# Patient Record
Sex: MALE | Race: ASIAN | NOT HISPANIC OR LATINO
[De-identification: names, ages, dates, MRNs, and addresses within clinical notes are randomized per-mention and may not be internally consistent; named-entity substitution may affect disease eponyms.]

---

## 2019-01-01 ENCOUNTER — APPOINTMENT (OUTPATIENT)
Dept: INFUSION THERAPY | Facility: HOSPITAL | Age: 74
End: 2019-01-01

## 2019-01-01 ENCOUNTER — LABORATORY RESULT (OUTPATIENT)
Age: 74
End: 2019-01-01

## 2019-01-01 ENCOUNTER — OUTPATIENT (OUTPATIENT)
Dept: OUTPATIENT SERVICES | Facility: HOSPITAL | Age: 74
LOS: 1 days | Discharge: HOME | End: 2019-01-01

## 2019-01-01 ENCOUNTER — FORM ENCOUNTER (OUTPATIENT)
Age: 74
End: 2019-01-01

## 2019-01-01 ENCOUNTER — APPOINTMENT (OUTPATIENT)
Dept: HEMATOLOGY ONCOLOGY | Facility: CLINIC | Age: 74
End: 2019-01-01
Payer: MEDICARE

## 2019-01-01 ENCOUNTER — RESULT REVIEW (OUTPATIENT)
Age: 74
End: 2019-01-01

## 2019-01-01 ENCOUNTER — OUTPATIENT (OUTPATIENT)
Dept: OUTPATIENT SERVICES | Facility: HOSPITAL | Age: 74
LOS: 1 days | End: 2019-01-01
Payer: COMMERCIAL

## 2019-01-01 ENCOUNTER — APPOINTMENT (OUTPATIENT)
Dept: COLORECTAL SURGERY | Facility: CLINIC | Age: 74
End: 2019-01-01

## 2019-01-01 ENCOUNTER — MOBILE ON CALL (OUTPATIENT)
Age: 74
End: 2019-01-01

## 2019-01-01 ENCOUNTER — TRANSCRIPTION ENCOUNTER (OUTPATIENT)
Age: 74
End: 2019-01-01

## 2019-01-01 ENCOUNTER — APPOINTMENT (OUTPATIENT)
Dept: INFUSION THERAPY | Facility: HOSPITAL | Age: 74
End: 2019-01-01
Payer: MEDICARE

## 2019-01-01 ENCOUNTER — APPOINTMENT (OUTPATIENT)
Dept: COLORECTAL SURGERY | Facility: CLINIC | Age: 74
End: 2019-01-01
Payer: MEDICARE

## 2019-01-01 ENCOUNTER — RX RENEWAL (OUTPATIENT)
Age: 74
End: 2019-01-01

## 2019-01-01 ENCOUNTER — APPOINTMENT (OUTPATIENT)
Dept: OTOLARYNGOLOGY | Facility: CLINIC | Age: 74
End: 2019-01-01
Payer: MEDICARE

## 2019-01-01 ENCOUNTER — APPOINTMENT (OUTPATIENT)
Dept: HEMATOLOGY ONCOLOGY | Facility: CLINIC | Age: 74
End: 2019-01-01

## 2019-01-01 ENCOUNTER — APPOINTMENT (OUTPATIENT)
Dept: CT IMAGING | Facility: IMAGING CENTER | Age: 74
End: 2019-01-01
Payer: MEDICARE

## 2019-01-01 ENCOUNTER — APPOINTMENT (OUTPATIENT)
Dept: DERMATOLOGY | Facility: CLINIC | Age: 74
End: 2019-01-01

## 2019-01-01 VITALS
WEIGHT: 121.23 LBS | SYSTOLIC BLOOD PRESSURE: 163 MMHG | BODY MASS INDEX: 22.91 KG/M2 | OXYGEN SATURATION: 100 % | HEART RATE: 80 BPM | DIASTOLIC BLOOD PRESSURE: 71 MMHG | RESPIRATION RATE: 16 BRPM | TEMPERATURE: 98.4 F

## 2019-01-01 VITALS
TEMPERATURE: 98.2 F | OXYGEN SATURATION: 99 % | HEART RATE: 87 BPM | BODY MASS INDEX: 22.49 KG/M2 | WEIGHT: 119.05 LBS | SYSTOLIC BLOOD PRESSURE: 120 MMHG | RESPIRATION RATE: 16 BRPM | DIASTOLIC BLOOD PRESSURE: 70 MMHG

## 2019-01-01 VITALS
WEIGHT: 127.87 LBS | DIASTOLIC BLOOD PRESSURE: 70 MMHG | OXYGEN SATURATION: 99 % | HEART RATE: 83 BPM | SYSTOLIC BLOOD PRESSURE: 120 MMHG | RESPIRATION RATE: 14 BRPM | TEMPERATURE: 98.3 F | BODY MASS INDEX: 24.16 KG/M2

## 2019-01-01 VITALS
DIASTOLIC BLOOD PRESSURE: 70 MMHG | WEIGHT: 125.66 LBS | HEART RATE: 101 BPM | SYSTOLIC BLOOD PRESSURE: 130 MMHG | OXYGEN SATURATION: 99 % | BODY MASS INDEX: 23.74 KG/M2 | RESPIRATION RATE: 14 BRPM | TEMPERATURE: 98.2 F

## 2019-01-01 VITALS
DIASTOLIC BLOOD PRESSURE: 70 MMHG | SYSTOLIC BLOOD PRESSURE: 130 MMHG | RESPIRATION RATE: 16 BRPM | TEMPERATURE: 98 F | OXYGEN SATURATION: 99 % | WEIGHT: 119.05 LBS | HEART RATE: 82 BPM | BODY MASS INDEX: 22.49 KG/M2

## 2019-01-01 VITALS
TEMPERATURE: 98.2 F | HEART RATE: 88 BPM | OXYGEN SATURATION: 98 % | RESPIRATION RATE: 16 BRPM | BODY MASS INDEX: 21.87 KG/M2 | WEIGHT: 115.74 LBS | DIASTOLIC BLOOD PRESSURE: 70 MMHG | SYSTOLIC BLOOD PRESSURE: 124 MMHG

## 2019-01-01 VITALS
RESPIRATION RATE: 16 BRPM | WEIGHT: 118.61 LBS | DIASTOLIC BLOOD PRESSURE: 83 MMHG | BODY MASS INDEX: 22.41 KG/M2 | SYSTOLIC BLOOD PRESSURE: 138 MMHG | OXYGEN SATURATION: 98 % | HEART RATE: 83 BPM | TEMPERATURE: 97.6 F

## 2019-01-01 VITALS
SYSTOLIC BLOOD PRESSURE: 124 MMHG | OXYGEN SATURATION: 99 % | BODY MASS INDEX: 22.29 KG/M2 | HEART RATE: 82 BPM | WEIGHT: 117.95 LBS | DIASTOLIC BLOOD PRESSURE: 72 MMHG | TEMPERATURE: 98.2 F | RESPIRATION RATE: 14 BRPM

## 2019-01-01 DIAGNOSIS — Z90.5 ACQUIRED ABSENCE OF KIDNEY: Chronic | ICD-10-CM

## 2019-01-01 DIAGNOSIS — Z90.79 ACQUIRED ABSENCE OF OTHER GENITAL ORGAN(S): Chronic | ICD-10-CM

## 2019-01-01 DIAGNOSIS — Z51.11 ENCOUNTER FOR ANTINEOPLASTIC CHEMOTHERAPY: ICD-10-CM

## 2019-01-01 DIAGNOSIS — E86.0 DEHYDRATION: ICD-10-CM

## 2019-01-01 DIAGNOSIS — C18.9 MALIGNANT NEOPLASM OF COLON, UNSPECIFIED: ICD-10-CM

## 2019-01-01 DIAGNOSIS — K56.609 UNSPECIFIED INTESTINAL OBSTRUCTION, UNSPECIFIED AS TO PARTIAL VERSUS COMPLETE OBSTRUCTION: Chronic | ICD-10-CM

## 2019-01-01 DIAGNOSIS — R11.2 NAUSEA WITH VOMITING, UNSPECIFIED: ICD-10-CM

## 2019-01-01 DIAGNOSIS — J06.9 ACUTE UPPER RESPIRATORY INFECTION, UNSPECIFIED: ICD-10-CM

## 2019-01-01 DIAGNOSIS — H92.11 OTORRHEA, RIGHT EAR: ICD-10-CM

## 2019-01-01 DIAGNOSIS — Z45.2 ENCOUNTER FOR ADJUSTMENT AND MANAGEMENT OF VASCULAR ACCESS DEVICE: ICD-10-CM

## 2019-01-01 LAB
BASOPHILS # BLD AUTO: 0 K/UL — SIGNIFICANT CHANGE UP (ref 0–0.2)
BASOPHILS # BLD AUTO: 0.1 K/UL — SIGNIFICANT CHANGE UP (ref 0–0.2)
BASOPHILS NFR BLD AUTO: 0.3 % — SIGNIFICANT CHANGE UP (ref 0–2)
BASOPHILS NFR BLD AUTO: 0.3 % — SIGNIFICANT CHANGE UP (ref 0–2)
BASOPHILS NFR BLD AUTO: 0.5 % — SIGNIFICANT CHANGE UP (ref 0–2)
BASOPHILS NFR BLD AUTO: 0.6 % — SIGNIFICANT CHANGE UP (ref 0–2)
BASOPHILS NFR BLD AUTO: 0.7 % — SIGNIFICANT CHANGE UP (ref 0–2)
BASOPHILS NFR BLD AUTO: 0.8 % — SIGNIFICANT CHANGE UP (ref 0–2)
BASOPHILS NFR BLD AUTO: 0.8 % — SIGNIFICANT CHANGE UP (ref 0–2)
BASOPHILS NFR BLD AUTO: 0.9 % — SIGNIFICANT CHANGE UP (ref 0–2)
BASOPHILS NFR BLD AUTO: 0.9 % — SIGNIFICANT CHANGE UP (ref 0–2)
EOSINOPHIL # BLD AUTO: 0.2 K/UL — SIGNIFICANT CHANGE UP (ref 0–0.5)
EOSINOPHIL # BLD AUTO: 0.2 K/UL — SIGNIFICANT CHANGE UP (ref 0–0.5)
EOSINOPHIL # BLD AUTO: 0.3 K/UL — SIGNIFICANT CHANGE UP (ref 0–0.5)
EOSINOPHIL # BLD AUTO: 0.4 K/UL — SIGNIFICANT CHANGE UP (ref 0–0.5)
EOSINOPHIL # BLD AUTO: 0.5 K/UL — SIGNIFICANT CHANGE UP (ref 0–0.5)
EOSINOPHIL # BLD AUTO: 0.5 K/UL — SIGNIFICANT CHANGE UP (ref 0–0.5)
EOSINOPHIL # BLD AUTO: 0.6 K/UL — HIGH (ref 0–0.5)
EOSINOPHIL # BLD AUTO: 0.7 K/UL — HIGH (ref 0–0.5)
EOSINOPHIL # BLD AUTO: 0.8 K/UL — HIGH (ref 0–0.5)
EOSINOPHIL NFR BLD AUTO: 2.9 % — SIGNIFICANT CHANGE UP (ref 0–6)
EOSINOPHIL NFR BLD AUTO: 3.2 % — SIGNIFICANT CHANGE UP (ref 0–6)
EOSINOPHIL NFR BLD AUTO: 3.4 % — SIGNIFICANT CHANGE UP (ref 0–6)
EOSINOPHIL NFR BLD AUTO: 4 % — SIGNIFICANT CHANGE UP (ref 0–6)
EOSINOPHIL NFR BLD AUTO: 4.8 % — SIGNIFICANT CHANGE UP (ref 0–6)
EOSINOPHIL NFR BLD AUTO: 5 % — SIGNIFICANT CHANGE UP (ref 0–6)
EOSINOPHIL NFR BLD AUTO: 5.1 % — SIGNIFICANT CHANGE UP (ref 0–6)
EOSINOPHIL NFR BLD AUTO: 6 % — SIGNIFICANT CHANGE UP (ref 0–6)
EOSINOPHIL NFR BLD AUTO: 6.3 % — HIGH (ref 0–6)
EOSINOPHIL NFR BLD AUTO: 6.8 % — HIGH (ref 0–6)
EOSINOPHIL NFR BLD AUTO: 6.8 % — HIGH (ref 0–6)
EOSINOPHIL NFR BLD AUTO: 7.1 % — HIGH (ref 0–6)
EOSINOPHIL NFR BLD AUTO: 7.6 % — HIGH (ref 0–6)
EOSINOPHIL NFR BLD AUTO: 7.7 % — HIGH (ref 0–6)
EOSINOPHIL NFR BLD AUTO: 7.7 % — HIGH (ref 0–6)
GLUCOSE BLDC GLUCOMTR-MCNC: 97 MG/DL — SIGNIFICANT CHANGE UP (ref 70–99)
HCT VFR BLD CALC: 30.5 % — LOW (ref 39–50)
HCT VFR BLD CALC: 30.7 % — LOW (ref 39–50)
HCT VFR BLD CALC: 32.9 % — LOW (ref 39–50)
HCT VFR BLD CALC: 36.2 % — LOW (ref 39–50)
HCT VFR BLD CALC: 36.4 % — LOW (ref 39–50)
HCT VFR BLD CALC: 37.9 % — LOW (ref 39–50)
HCT VFR BLD CALC: 38.4 % — LOW (ref 39–50)
HCT VFR BLD CALC: 38.7 % — LOW (ref 39–50)
HCT VFR BLD CALC: 39.4 % — SIGNIFICANT CHANGE UP (ref 39–50)
HCT VFR BLD CALC: 39.7 % — SIGNIFICANT CHANGE UP (ref 39–50)
HCT VFR BLD CALC: 41.6 % — SIGNIFICANT CHANGE UP (ref 39–50)
HCT VFR BLD CALC: 41.9 % — SIGNIFICANT CHANGE UP (ref 39–50)
HCT VFR BLD CALC: 42.7 % — SIGNIFICANT CHANGE UP (ref 39–50)
HCT VFR BLD CALC: 43.1 % — SIGNIFICANT CHANGE UP (ref 39–50)
HCT VFR BLD CALC: 43.4 % — SIGNIFICANT CHANGE UP (ref 39–50)
HGB BLD-MCNC: 10.3 G/DL — LOW (ref 13–17)
HGB BLD-MCNC: 10.6 G/DL — LOW (ref 13–17)
HGB BLD-MCNC: 11 G/DL — LOW (ref 13–17)
HGB BLD-MCNC: 12.1 G/DL — LOW (ref 13–17)
HGB BLD-MCNC: 12.1 G/DL — LOW (ref 13–17)
HGB BLD-MCNC: 12.2 G/DL — LOW (ref 13–17)
HGB BLD-MCNC: 12.9 G/DL — LOW (ref 13–17)
HGB BLD-MCNC: 13 G/DL — SIGNIFICANT CHANGE UP (ref 13–17)
HGB BLD-MCNC: 13.4 G/DL — SIGNIFICANT CHANGE UP (ref 13–17)
HGB BLD-MCNC: 13.5 G/DL — SIGNIFICANT CHANGE UP (ref 13–17)
HGB BLD-MCNC: 14 G/DL — SIGNIFICANT CHANGE UP (ref 13–17)
HGB BLD-MCNC: 14 G/DL — SIGNIFICANT CHANGE UP (ref 13–17)
HGB BLD-MCNC: 14.2 G/DL — SIGNIFICANT CHANGE UP (ref 13–17)
HGB BLD-MCNC: 14.2 G/DL — SIGNIFICANT CHANGE UP (ref 13–17)
HGB BLD-MCNC: 14.4 G/DL — SIGNIFICANT CHANGE UP (ref 13–17)
LYMPHOCYTES # BLD AUTO: 1.4 K/UL — SIGNIFICANT CHANGE UP (ref 1–3.3)
LYMPHOCYTES # BLD AUTO: 1.5 K/UL — SIGNIFICANT CHANGE UP (ref 1–3.3)
LYMPHOCYTES # BLD AUTO: 1.5 K/UL — SIGNIFICANT CHANGE UP (ref 1–3.3)
LYMPHOCYTES # BLD AUTO: 1.6 K/UL — SIGNIFICANT CHANGE UP (ref 1–3.3)
LYMPHOCYTES # BLD AUTO: 1.6 K/UL — SIGNIFICANT CHANGE UP (ref 1–3.3)
LYMPHOCYTES # BLD AUTO: 1.7 K/UL — SIGNIFICANT CHANGE UP (ref 1–3.3)
LYMPHOCYTES # BLD AUTO: 1.8 K/UL — SIGNIFICANT CHANGE UP (ref 1–3.3)
LYMPHOCYTES # BLD AUTO: 1.8 K/UL — SIGNIFICANT CHANGE UP (ref 1–3.3)
LYMPHOCYTES # BLD AUTO: 1.9 K/UL — SIGNIFICANT CHANGE UP (ref 1–3.3)
LYMPHOCYTES # BLD AUTO: 17.9 % — SIGNIFICANT CHANGE UP (ref 13–44)
LYMPHOCYTES # BLD AUTO: 2 K/UL — SIGNIFICANT CHANGE UP (ref 1–3.3)
LYMPHOCYTES # BLD AUTO: 2.1 K/UL — SIGNIFICANT CHANGE UP (ref 1–3.3)
LYMPHOCYTES # BLD AUTO: 2.2 K/UL — SIGNIFICANT CHANGE UP (ref 1–3.3)
LYMPHOCYTES # BLD AUTO: 20.9 % — SIGNIFICANT CHANGE UP (ref 13–44)
LYMPHOCYTES # BLD AUTO: 22.7 % — SIGNIFICANT CHANGE UP (ref 13–44)
LYMPHOCYTES # BLD AUTO: 23.6 % — SIGNIFICANT CHANGE UP (ref 13–44)
LYMPHOCYTES # BLD AUTO: 23.9 % — SIGNIFICANT CHANGE UP (ref 13–44)
LYMPHOCYTES # BLD AUTO: 24 % — SIGNIFICANT CHANGE UP (ref 13–44)
LYMPHOCYTES # BLD AUTO: 24 % — SIGNIFICANT CHANGE UP (ref 13–44)
LYMPHOCYTES # BLD AUTO: 25.1 % — SIGNIFICANT CHANGE UP (ref 13–44)
LYMPHOCYTES # BLD AUTO: 25.9 % — SIGNIFICANT CHANGE UP (ref 13–44)
LYMPHOCYTES # BLD AUTO: 26.4 % — SIGNIFICANT CHANGE UP (ref 13–44)
LYMPHOCYTES # BLD AUTO: 27 % — SIGNIFICANT CHANGE UP (ref 13–44)
LYMPHOCYTES # BLD AUTO: 28.9 % — SIGNIFICANT CHANGE UP (ref 13–44)
LYMPHOCYTES # BLD AUTO: 29.5 % — SIGNIFICANT CHANGE UP (ref 13–44)
LYMPHOCYTES # BLD AUTO: 31.4 % — SIGNIFICANT CHANGE UP (ref 13–44)
LYMPHOCYTES # BLD AUTO: 46 % — HIGH (ref 13–44)
MCHC RBC-ENTMCNC: 29.6 PG — SIGNIFICANT CHANGE UP (ref 27–34)
MCHC RBC-ENTMCNC: 30.4 PG — SIGNIFICANT CHANGE UP (ref 27–34)
MCHC RBC-ENTMCNC: 30.6 PG — SIGNIFICANT CHANGE UP (ref 27–34)
MCHC RBC-ENTMCNC: 31 PG — SIGNIFICANT CHANGE UP (ref 27–34)
MCHC RBC-ENTMCNC: 31 PG — SIGNIFICANT CHANGE UP (ref 27–34)
MCHC RBC-ENTMCNC: 31.5 PG — SIGNIFICANT CHANGE UP (ref 27–34)
MCHC RBC-ENTMCNC: 31.7 PG — SIGNIFICANT CHANGE UP (ref 27–34)
MCHC RBC-ENTMCNC: 31.8 PG — SIGNIFICANT CHANGE UP (ref 27–34)
MCHC RBC-ENTMCNC: 32 G/DL — SIGNIFICANT CHANGE UP (ref 32–36)
MCHC RBC-ENTMCNC: 32.1 PG — SIGNIFICANT CHANGE UP (ref 27–34)
MCHC RBC-ENTMCNC: 32.7 G/DL — SIGNIFICANT CHANGE UP (ref 32–36)
MCHC RBC-ENTMCNC: 32.8 G/DL — SIGNIFICANT CHANGE UP (ref 32–36)
MCHC RBC-ENTMCNC: 33.1 G/DL — SIGNIFICANT CHANGE UP (ref 32–36)
MCHC RBC-ENTMCNC: 33.1 PG — SIGNIFICANT CHANGE UP (ref 27–34)
MCHC RBC-ENTMCNC: 33.3 G/DL — SIGNIFICANT CHANGE UP (ref 32–36)
MCHC RBC-ENTMCNC: 33.4 G/DL — SIGNIFICANT CHANGE UP (ref 32–36)
MCHC RBC-ENTMCNC: 33.5 G/DL — SIGNIFICANT CHANGE UP (ref 32–36)
MCHC RBC-ENTMCNC: 33.6 G/DL — SIGNIFICANT CHANGE UP (ref 32–36)
MCHC RBC-ENTMCNC: 33.6 G/DL — SIGNIFICANT CHANGE UP (ref 32–36)
MCHC RBC-ENTMCNC: 33.7 G/DL — SIGNIFICANT CHANGE UP (ref 32–36)
MCHC RBC-ENTMCNC: 33.8 G/DL — SIGNIFICANT CHANGE UP (ref 32–36)
MCHC RBC-ENTMCNC: 33.9 G/DL — SIGNIFICANT CHANGE UP (ref 32–36)
MCHC RBC-ENTMCNC: 33.9 PG — SIGNIFICANT CHANGE UP (ref 27–34)
MCHC RBC-ENTMCNC: 34 PG — SIGNIFICANT CHANGE UP (ref 27–34)
MCHC RBC-ENTMCNC: 34.1 PG — HIGH (ref 27–34)
MCHC RBC-ENTMCNC: 34.2 G/DL — SIGNIFICANT CHANGE UP (ref 32–36)
MCHC RBC-ENTMCNC: 34.3 G/DL — SIGNIFICANT CHANGE UP (ref 32–36)
MCHC RBC-ENTMCNC: 34.4 PG — HIGH (ref 27–34)
MCHC RBC-ENTMCNC: 34.4 PG — HIGH (ref 27–34)
MCHC RBC-ENTMCNC: 34.5 G/DL — SIGNIFICANT CHANGE UP (ref 32–36)
MCV RBC AUTO: 100 FL — SIGNIFICANT CHANGE UP (ref 80–100)
MCV RBC AUTO: 100 FL — SIGNIFICANT CHANGE UP (ref 80–100)
MCV RBC AUTO: 101 FL — HIGH (ref 80–100)
MCV RBC AUTO: 90 FL — SIGNIFICANT CHANGE UP (ref 80–100)
MCV RBC AUTO: 90.4 FL — SIGNIFICANT CHANGE UP (ref 80–100)
MCV RBC AUTO: 91.2 FL — SIGNIFICANT CHANGE UP (ref 80–100)
MCV RBC AUTO: 92.3 FL — SIGNIFICANT CHANGE UP (ref 80–100)
MCV RBC AUTO: 92.7 FL — SIGNIFICANT CHANGE UP (ref 80–100)
MCV RBC AUTO: 94.3 FL — SIGNIFICANT CHANGE UP (ref 80–100)
MCV RBC AUTO: 95.2 FL — SIGNIFICANT CHANGE UP (ref 80–100)
MCV RBC AUTO: 95.8 FL — SIGNIFICANT CHANGE UP (ref 80–100)
MCV RBC AUTO: 97.1 FL — SIGNIFICANT CHANGE UP (ref 80–100)
MONOCYTES # BLD AUTO: 0.5 K/UL — SIGNIFICANT CHANGE UP (ref 0–0.9)
MONOCYTES # BLD AUTO: 0.6 K/UL — SIGNIFICANT CHANGE UP (ref 0–0.9)
MONOCYTES # BLD AUTO: 0.6 K/UL — SIGNIFICANT CHANGE UP (ref 0–0.9)
MONOCYTES # BLD AUTO: 0.7 K/UL — SIGNIFICANT CHANGE UP (ref 0–0.9)
MONOCYTES # BLD AUTO: 0.7 K/UL — SIGNIFICANT CHANGE UP (ref 0–0.9)
MONOCYTES # BLD AUTO: 0.8 K/UL — SIGNIFICANT CHANGE UP (ref 0–0.9)
MONOCYTES # BLD AUTO: 0.9 K/UL — SIGNIFICANT CHANGE UP (ref 0–0.9)
MONOCYTES # BLD AUTO: 0.9 K/UL — SIGNIFICANT CHANGE UP (ref 0–0.9)
MONOCYTES NFR BLD AUTO: 10.6 % — SIGNIFICANT CHANGE UP (ref 2–14)
MONOCYTES NFR BLD AUTO: 12.4 % — SIGNIFICANT CHANGE UP (ref 2–14)
MONOCYTES NFR BLD AUTO: 12.4 % — SIGNIFICANT CHANGE UP (ref 2–14)
MONOCYTES NFR BLD AUTO: 12.6 % — SIGNIFICANT CHANGE UP (ref 2–14)
MONOCYTES NFR BLD AUTO: 13 % — SIGNIFICANT CHANGE UP (ref 2–14)
MONOCYTES NFR BLD AUTO: 15 % — HIGH (ref 2–14)
MONOCYTES NFR BLD AUTO: 6.2 % — SIGNIFICANT CHANGE UP (ref 2–14)
MONOCYTES NFR BLD AUTO: 6.6 % — SIGNIFICANT CHANGE UP (ref 2–14)
MONOCYTES NFR BLD AUTO: 6.9 % — SIGNIFICANT CHANGE UP (ref 2–14)
MONOCYTES NFR BLD AUTO: 7.1 % — SIGNIFICANT CHANGE UP (ref 2–14)
MONOCYTES NFR BLD AUTO: 8 % — SIGNIFICANT CHANGE UP (ref 2–14)
MONOCYTES NFR BLD AUTO: 8.2 % — SIGNIFICANT CHANGE UP (ref 2–14)
MONOCYTES NFR BLD AUTO: 8.6 % — SIGNIFICANT CHANGE UP (ref 2–14)
MONOCYTES NFR BLD AUTO: 8.9 % — SIGNIFICANT CHANGE UP (ref 2–14)
MONOCYTES NFR BLD AUTO: 9.2 % — SIGNIFICANT CHANGE UP (ref 2–14)
NEUTROPHILS # BLD AUTO: 1.6 K/UL — LOW (ref 1.8–7.4)
NEUTROPHILS # BLD AUTO: 2.8 K/UL — SIGNIFICANT CHANGE UP (ref 1.8–7.4)
NEUTROPHILS # BLD AUTO: 3 K/UL — SIGNIFICANT CHANGE UP (ref 1.8–7.4)
NEUTROPHILS # BLD AUTO: 3.1 K/UL — SIGNIFICANT CHANGE UP (ref 1.8–7.4)
NEUTROPHILS # BLD AUTO: 3.2 K/UL — SIGNIFICANT CHANGE UP (ref 1.8–7.4)
NEUTROPHILS # BLD AUTO: 3.5 K/UL — SIGNIFICANT CHANGE UP (ref 1.8–7.4)
NEUTROPHILS # BLD AUTO: 3.6 K/UL — SIGNIFICANT CHANGE UP (ref 1.8–7.4)
NEUTROPHILS # BLD AUTO: 3.8 K/UL — SIGNIFICANT CHANGE UP (ref 1.8–7.4)
NEUTROPHILS # BLD AUTO: 4.3 K/UL — SIGNIFICANT CHANGE UP (ref 1.8–7.4)
NEUTROPHILS # BLD AUTO: 4.7 K/UL — SIGNIFICANT CHANGE UP (ref 1.8–7.4)
NEUTROPHILS # BLD AUTO: 4.9 K/UL — SIGNIFICANT CHANGE UP (ref 1.8–7.4)
NEUTROPHILS # BLD AUTO: 5 K/UL — SIGNIFICANT CHANGE UP (ref 1.8–7.4)
NEUTROPHILS # BLD AUTO: 5.9 K/UL — SIGNIFICANT CHANGE UP (ref 1.8–7.4)
NEUTROPHILS # BLD AUTO: 6.6 K/UL — SIGNIFICANT CHANGE UP (ref 1.8–7.4)
NEUTROPHILS # BLD AUTO: 6.7 K/UL — SIGNIFICANT CHANGE UP (ref 1.8–7.4)
NEUTROPHILS NFR BLD AUTO: 35 % — LOW (ref 43–77)
NEUTROPHILS NFR BLD AUTO: 46.3 % — SIGNIFICANT CHANGE UP (ref 43–77)
NEUTROPHILS NFR BLD AUTO: 54 % — SIGNIFICANT CHANGE UP (ref 43–77)
NEUTROPHILS NFR BLD AUTO: 54.6 % — SIGNIFICANT CHANGE UP (ref 43–77)
NEUTROPHILS NFR BLD AUTO: 56 % — SIGNIFICANT CHANGE UP (ref 43–77)
NEUTROPHILS NFR BLD AUTO: 56 % — SIGNIFICANT CHANGE UP (ref 43–77)
NEUTROPHILS NFR BLD AUTO: 56.6 % — SIGNIFICANT CHANGE UP (ref 43–77)
NEUTROPHILS NFR BLD AUTO: 58.5 % — SIGNIFICANT CHANGE UP (ref 43–77)
NEUTROPHILS NFR BLD AUTO: 60.3 % — SIGNIFICANT CHANGE UP (ref 43–77)
NEUTROPHILS NFR BLD AUTO: 61.8 % — SIGNIFICANT CHANGE UP (ref 43–77)
NEUTROPHILS NFR BLD AUTO: 62.3 % — SIGNIFICANT CHANGE UP (ref 43–77)
NEUTROPHILS NFR BLD AUTO: 63.1 % — SIGNIFICANT CHANGE UP (ref 43–77)
NEUTROPHILS NFR BLD AUTO: 65.5 % — SIGNIFICANT CHANGE UP (ref 43–77)
NEUTROPHILS NFR BLD AUTO: 65.6 % — SIGNIFICANT CHANGE UP (ref 43–77)
NEUTROPHILS NFR BLD AUTO: 68.5 % — SIGNIFICANT CHANGE UP (ref 43–77)
PLAT MORPH BLD: NORMAL — SIGNIFICANT CHANGE UP
PLATELET # BLD AUTO: 164 K/UL — SIGNIFICANT CHANGE UP (ref 150–400)
PLATELET # BLD AUTO: 201 K/UL — SIGNIFICANT CHANGE UP (ref 150–400)
PLATELET # BLD AUTO: 206 K/UL — SIGNIFICANT CHANGE UP (ref 150–400)
PLATELET # BLD AUTO: 221 K/UL — SIGNIFICANT CHANGE UP (ref 150–400)
PLATELET # BLD AUTO: 224 K/UL — SIGNIFICANT CHANGE UP (ref 150–400)
PLATELET # BLD AUTO: 231 K/UL — SIGNIFICANT CHANGE UP (ref 150–400)
PLATELET # BLD AUTO: 252 K/UL — SIGNIFICANT CHANGE UP (ref 150–400)
PLATELET # BLD AUTO: 256 K/UL — SIGNIFICANT CHANGE UP (ref 150–400)
PLATELET # BLD AUTO: 260 K/UL — SIGNIFICANT CHANGE UP (ref 150–400)
PLATELET # BLD AUTO: 307 K/UL — SIGNIFICANT CHANGE UP (ref 150–400)
PLATELET # BLD AUTO: 313 K/UL — SIGNIFICANT CHANGE UP (ref 150–400)
PLATELET # BLD AUTO: 329 K/UL — SIGNIFICANT CHANGE UP (ref 150–400)
PLATELET # BLD AUTO: 385 K/UL — SIGNIFICANT CHANGE UP (ref 150–400)
PLATELET # BLD AUTO: 436 K/UL — HIGH (ref 150–400)
PLATELET # BLD AUTO: 479 K/UL — HIGH (ref 150–400)
RBC # BLD: 3.38 M/UL — LOW (ref 4.2–5.8)
RBC # BLD: 3.41 M/UL — LOW (ref 4.2–5.8)
RBC # BLD: 3.61 M/UL — LOW (ref 4.2–5.8)
RBC # BLD: 3.83 M/UL — LOW (ref 4.2–5.8)
RBC # BLD: 3.86 M/UL — LOW (ref 4.2–5.8)
RBC # BLD: 3.92 M/UL — LOW (ref 4.2–5.8)
RBC # BLD: 4.07 M/UL — LOW (ref 4.2–5.8)
RBC # BLD: 4.09 M/UL — LOW (ref 4.2–5.8)
RBC # BLD: 4.13 M/UL — LOW (ref 4.2–5.8)
RBC # BLD: 4.18 M/UL — LOW (ref 4.2–5.8)
RBC # BLD: 4.29 M/UL — SIGNIFICANT CHANGE UP (ref 4.2–5.8)
RBC # BLD: 4.44 M/UL — SIGNIFICANT CHANGE UP (ref 4.2–5.8)
RBC # BLD: 4.45 M/UL — SIGNIFICANT CHANGE UP (ref 4.2–5.8)
RBC # FLD: 12.8 % — SIGNIFICANT CHANGE UP (ref 10.3–14.5)
RBC # FLD: 12.9 % — SIGNIFICANT CHANGE UP (ref 10.3–14.5)
RBC # FLD: 13.1 % — SIGNIFICANT CHANGE UP (ref 10.3–14.5)
RBC # FLD: 13.4 % — SIGNIFICANT CHANGE UP (ref 10.3–14.5)
RBC # FLD: 13.5 % — SIGNIFICANT CHANGE UP (ref 10.3–14.5)
RBC # FLD: 13.5 % — SIGNIFICANT CHANGE UP (ref 10.3–14.5)
RBC # FLD: 13.8 % — SIGNIFICANT CHANGE UP (ref 10.3–14.5)
RBC # FLD: 14.2 % — SIGNIFICANT CHANGE UP (ref 10.3–14.5)
RBC # FLD: 14.5 % — SIGNIFICANT CHANGE UP (ref 10.3–14.5)
RBC # FLD: 15.3 % — HIGH (ref 10.3–14.5)
RBC # FLD: 15.6 % — HIGH (ref 10.3–14.5)
RBC # FLD: 16.1 % — HIGH (ref 10.3–14.5)
RBC # FLD: 16.1 % — HIGH (ref 10.3–14.5)
RBC # FLD: 16.2 % — HIGH (ref 10.3–14.5)
RBC # FLD: 16.4 % — HIGH (ref 10.3–14.5)
RBC BLD AUTO: SIGNIFICANT CHANGE UP
WBC # BLD: 10.3 K/UL — SIGNIFICANT CHANGE UP (ref 3.8–10.5)
WBC # BLD: 4.8 K/UL — SIGNIFICANT CHANGE UP (ref 3.8–10.5)
WBC # BLD: 5.5 K/UL — SIGNIFICANT CHANGE UP (ref 3.8–10.5)
WBC # BLD: 5.5 K/UL — SIGNIFICANT CHANGE UP (ref 3.8–10.5)
WBC # BLD: 5.7 K/UL — SIGNIFICANT CHANGE UP (ref 3.8–10.5)
WBC # BLD: 6 K/UL — SIGNIFICANT CHANGE UP (ref 3.8–10.5)
WBC # BLD: 6 K/UL — SIGNIFICANT CHANGE UP (ref 3.8–10.5)
WBC # BLD: 6.6 K/UL — SIGNIFICANT CHANGE UP (ref 3.8–10.5)
WBC # BLD: 6.8 K/UL — SIGNIFICANT CHANGE UP (ref 3.8–10.5)
WBC # BLD: 6.8 K/UL — SIGNIFICANT CHANGE UP (ref 3.8–10.5)
WBC # BLD: 7.2 K/UL — SIGNIFICANT CHANGE UP (ref 3.8–10.5)
WBC # BLD: 7.9 K/UL — SIGNIFICANT CHANGE UP (ref 3.8–10.5)
WBC # BLD: 8 K/UL — SIGNIFICANT CHANGE UP (ref 3.8–10.5)
WBC # BLD: 9.7 K/UL — SIGNIFICANT CHANGE UP (ref 3.8–10.5)
WBC # BLD: 9.8 K/UL — SIGNIFICANT CHANGE UP (ref 3.8–10.5)
WBC # FLD AUTO: 10.3 K/UL — SIGNIFICANT CHANGE UP (ref 3.8–10.5)
WBC # FLD AUTO: 4.8 K/UL — SIGNIFICANT CHANGE UP (ref 3.8–10.5)
WBC # FLD AUTO: 5.5 K/UL — SIGNIFICANT CHANGE UP (ref 3.8–10.5)
WBC # FLD AUTO: 5.5 K/UL — SIGNIFICANT CHANGE UP (ref 3.8–10.5)
WBC # FLD AUTO: 5.7 K/UL — SIGNIFICANT CHANGE UP (ref 3.8–10.5)
WBC # FLD AUTO: 6 K/UL — SIGNIFICANT CHANGE UP (ref 3.8–10.5)
WBC # FLD AUTO: 6 K/UL — SIGNIFICANT CHANGE UP (ref 3.8–10.5)
WBC # FLD AUTO: 6.6 K/UL — SIGNIFICANT CHANGE UP (ref 3.8–10.5)
WBC # FLD AUTO: 6.8 K/UL — SIGNIFICANT CHANGE UP (ref 3.8–10.5)
WBC # FLD AUTO: 6.8 K/UL — SIGNIFICANT CHANGE UP (ref 3.8–10.5)
WBC # FLD AUTO: 7.2 K/UL — SIGNIFICANT CHANGE UP (ref 3.8–10.5)
WBC # FLD AUTO: 7.9 K/UL — SIGNIFICANT CHANGE UP (ref 3.8–10.5)
WBC # FLD AUTO: 8 K/UL — SIGNIFICANT CHANGE UP (ref 3.8–10.5)
WBC # FLD AUTO: 9.7 K/UL — SIGNIFICANT CHANGE UP (ref 3.8–10.5)
WBC # FLD AUTO: 9.8 K/UL — SIGNIFICANT CHANGE UP (ref 3.8–10.5)

## 2019-01-01 PROCEDURE — 74177 CT ABD & PELVIS W/CONTRAST: CPT | Mod: 26

## 2019-01-01 PROCEDURE — 99213 OFFICE O/P EST LOW 20 MIN: CPT

## 2019-01-01 PROCEDURE — 99024 POSTOP FOLLOW-UP VISIT: CPT

## 2019-01-01 PROCEDURE — 76937 US GUIDE VASCULAR ACCESS: CPT | Mod: 26

## 2019-01-01 PROCEDURE — 71260 CT THORAX DX C+: CPT | Mod: 26

## 2019-01-01 PROCEDURE — C1894: CPT

## 2019-01-01 PROCEDURE — 71260 CT THORAX DX C+: CPT

## 2019-01-01 PROCEDURE — 99214 OFFICE O/P EST MOD 30 MIN: CPT

## 2019-01-01 PROCEDURE — 74177 CT ABD & PELVIS W/CONTRAST: CPT

## 2019-01-01 PROCEDURE — 77001 FLUOROGUIDE FOR VEIN DEVICE: CPT

## 2019-01-01 PROCEDURE — 99203 OFFICE O/P NEW LOW 30 MIN: CPT

## 2019-01-01 PROCEDURE — C1769: CPT

## 2019-01-01 PROCEDURE — 99215 OFFICE O/P EST HI 40 MIN: CPT

## 2019-01-01 PROCEDURE — 76937 US GUIDE VASCULAR ACCESS: CPT

## 2019-01-01 PROCEDURE — 77001 FLUOROGUIDE FOR VEIN DEVICE: CPT | Mod: 26

## 2019-01-01 PROCEDURE — 82565 ASSAY OF CREATININE: CPT

## 2019-01-01 PROCEDURE — C1788: CPT

## 2019-01-01 PROCEDURE — 36561 INSERT TUNNELED CV CATH: CPT

## 2019-01-01 PROCEDURE — 82962 GLUCOSE BLOOD TEST: CPT

## 2019-01-01 PROCEDURE — 99212 OFFICE O/P EST SF 10 MIN: CPT

## 2019-01-01 RX ORDER — ATORVASTATIN CALCIUM 80 MG/1
1 TABLET, FILM COATED ORAL
Qty: 0 | Refills: 0 | DISCHARGE

## 2019-01-01 RX ORDER — HYDROXYZINE HYDROCHLORIDE 10 MG/1
10 TABLET ORAL
Qty: 30 | Refills: 0 | Status: DISCONTINUED | COMMUNITY
Start: 2019-05-24 | End: 2019-01-01

## 2019-01-01 RX ORDER — ASPIRIN/CALCIUM CARB/MAGNESIUM 324 MG
1 TABLET ORAL
Qty: 0 | Refills: 0 | DISCHARGE

## 2019-01-01 RX ORDER — MULTIVITAMIN
TABLET ORAL
Refills: 0 | Status: DISCONTINUED | COMMUNITY
End: 2019-01-01

## 2019-01-01 RX ORDER — DOXYCYCLINE HYCLATE 100 MG/1
100 CAPSULE ORAL TWICE DAILY
Qty: 60 | Refills: 0 | Status: DISCONTINUED | COMMUNITY
Start: 2019-01-01 | End: 2019-01-01

## 2019-01-01 RX ORDER — MAGNESIUM OXIDE 400 MG
400 CAPSULE ORAL
Qty: 5 | Refills: 0 | Status: DISCONTINUED | COMMUNITY
Start: 2019-01-01 | End: 2019-01-01

## 2019-01-01 RX ORDER — CALCIUM CARBONATE/VITAMIN D3 500-10/5ML
400 LIQUID (ML) ORAL DAILY
Qty: 5 | Refills: 1 | Status: DISCONTINUED | COMMUNITY
Start: 2019-01-01 | End: 2019-01-01

## 2019-01-24 ENCOUNTER — EMERGENCY (EMERGENCY)
Facility: HOSPITAL | Age: 74
LOS: 1 days | Discharge: HOME | End: 2019-01-24
Attending: EMERGENCY MEDICINE | Admitting: EMERGENCY MEDICINE
Payer: MEDICARE

## 2019-01-24 VITALS
SYSTOLIC BLOOD PRESSURE: 166 MMHG | TEMPERATURE: 98 F | DIASTOLIC BLOOD PRESSURE: 95 MMHG | OXYGEN SATURATION: 100 % | RESPIRATION RATE: 16 BRPM | HEART RATE: 92 BPM

## 2019-01-24 VITALS
RESPIRATION RATE: 17 BRPM | OXYGEN SATURATION: 100 % | TEMPERATURE: 98 F | DIASTOLIC BLOOD PRESSURE: 93 MMHG | SYSTOLIC BLOOD PRESSURE: 158 MMHG | HEART RATE: 78 BPM

## 2019-01-24 LAB
ALBUMIN SERPL ELPH-MCNC: 4.7 G/DL — SIGNIFICANT CHANGE UP (ref 3.3–5)
ALP SERPL-CCNC: 90 U/L — SIGNIFICANT CHANGE UP (ref 40–120)
ALT FLD-CCNC: 25 U/L — SIGNIFICANT CHANGE UP (ref 4–41)
ANION GAP SERPL CALC-SCNC: 12 MMO/L — SIGNIFICANT CHANGE UP (ref 7–14)
AST SERPL-CCNC: 18 U/L — SIGNIFICANT CHANGE UP (ref 4–40)
BASE EXCESS BLDV CALC-SCNC: 3.6 MMOL/L — SIGNIFICANT CHANGE UP
BASOPHILS # BLD AUTO: 0.04 K/UL — SIGNIFICANT CHANGE UP (ref 0–0.2)
BASOPHILS NFR BLD AUTO: 0.6 % — SIGNIFICANT CHANGE UP (ref 0–2)
BILIRUB SERPL-MCNC: 0.7 MG/DL — SIGNIFICANT CHANGE UP (ref 0.2–1.2)
BLOOD GAS VENOUS - CREATININE: 1.05 MG/DL — SIGNIFICANT CHANGE UP (ref 0.5–1.3)
BUN SERPL-MCNC: 18 MG/DL — SIGNIFICANT CHANGE UP (ref 7–23)
CALCIUM SERPL-MCNC: 9.5 MG/DL — SIGNIFICANT CHANGE UP (ref 8.4–10.5)
CHLORIDE BLDV-SCNC: 102 MMOL/L — SIGNIFICANT CHANGE UP (ref 96–108)
CHLORIDE SERPL-SCNC: 98 MMOL/L — SIGNIFICANT CHANGE UP (ref 98–107)
CO2 SERPL-SCNC: 26 MMOL/L — SIGNIFICANT CHANGE UP (ref 22–31)
CREAT SERPL-MCNC: 1.17 MG/DL — SIGNIFICANT CHANGE UP (ref 0.5–1.3)
EOSINOPHIL # BLD AUTO: 0.08 K/UL — SIGNIFICANT CHANGE UP (ref 0–0.5)
EOSINOPHIL NFR BLD AUTO: 1.1 % — SIGNIFICANT CHANGE UP (ref 0–6)
GAS PNL BLDV: 134 MMOL/L — LOW (ref 136–146)
GLUCOSE BLDV-MCNC: 114 — HIGH (ref 70–99)
GLUCOSE SERPL-MCNC: 117 MG/DL — HIGH (ref 70–99)
HCO3 BLDV-SCNC: 26 MMOL/L — SIGNIFICANT CHANGE UP (ref 20–27)
HCT VFR BLD CALC: 41.6 % — SIGNIFICANT CHANGE UP (ref 39–50)
HCT VFR BLDV CALC: 42.7 % — SIGNIFICANT CHANGE UP (ref 39–51)
HGB BLD-MCNC: 13.7 G/DL — SIGNIFICANT CHANGE UP (ref 13–17)
HGB BLDV-MCNC: 13.9 G/DL — SIGNIFICANT CHANGE UP (ref 13–17)
IMM GRANULOCYTES NFR BLD AUTO: 0.1 % — SIGNIFICANT CHANGE UP (ref 0–1.5)
LACTATE BLDV-MCNC: 1.2 MMOL/L — SIGNIFICANT CHANGE UP (ref 0.5–2)
LYMPHOCYTES # BLD AUTO: 1.52 K/UL — SIGNIFICANT CHANGE UP (ref 1–3.3)
LYMPHOCYTES # BLD AUTO: 21 % — SIGNIFICANT CHANGE UP (ref 13–44)
MCHC RBC-ENTMCNC: 30.8 PG — SIGNIFICANT CHANGE UP (ref 27–34)
MCHC RBC-ENTMCNC: 32.9 % — SIGNIFICANT CHANGE UP (ref 32–36)
MCV RBC AUTO: 93.5 FL — SIGNIFICANT CHANGE UP (ref 80–100)
MONOCYTES # BLD AUTO: 0.55 K/UL — SIGNIFICANT CHANGE UP (ref 0–0.9)
MONOCYTES NFR BLD AUTO: 7.6 % — SIGNIFICANT CHANGE UP (ref 2–14)
NEUTROPHILS # BLD AUTO: 5.05 K/UL — SIGNIFICANT CHANGE UP (ref 1.8–7.4)
NEUTROPHILS NFR BLD AUTO: 69.6 % — SIGNIFICANT CHANGE UP (ref 43–77)
NRBC # FLD: 0 K/UL — LOW (ref 25–125)
PCO2 BLDV: 47 MMHG — SIGNIFICANT CHANGE UP (ref 41–51)
PH BLDV: 7.39 PH — SIGNIFICANT CHANGE UP (ref 7.32–7.43)
PLATELET # BLD AUTO: 343 K/UL — SIGNIFICANT CHANGE UP (ref 150–400)
PMV BLD: 8.6 FL — SIGNIFICANT CHANGE UP (ref 7–13)
PO2 BLDV: 32 MMHG — LOW (ref 35–40)
POTASSIUM BLDV-SCNC: 4.2 MMOL/L — SIGNIFICANT CHANGE UP (ref 3.4–4.5)
POTASSIUM SERPL-MCNC: 4.5 MMOL/L — SIGNIFICANT CHANGE UP (ref 3.5–5.3)
POTASSIUM SERPL-SCNC: 4.5 MMOL/L — SIGNIFICANT CHANGE UP (ref 3.5–5.3)
PROT SERPL-MCNC: 8 G/DL — SIGNIFICANT CHANGE UP (ref 6–8.3)
RBC # BLD: 4.45 M/UL — SIGNIFICANT CHANGE UP (ref 4.2–5.8)
RBC # FLD: 11.7 % — SIGNIFICANT CHANGE UP (ref 10.3–14.5)
SAO2 % BLDV: 55.9 % — LOW (ref 60–85)
SODIUM SERPL-SCNC: 136 MMOL/L — SIGNIFICANT CHANGE UP (ref 135–145)
WBC # BLD: 7.25 K/UL — SIGNIFICANT CHANGE UP (ref 3.8–10.5)
WBC # FLD AUTO: 7.25 K/UL — SIGNIFICANT CHANGE UP (ref 3.8–10.5)

## 2019-01-24 PROCEDURE — 99284 EMERGENCY DEPT VISIT MOD MDM: CPT

## 2019-01-24 PROCEDURE — 74176 CT ABD & PELVIS W/O CONTRAST: CPT | Mod: 26

## 2019-01-24 RX ORDER — SOD SULF/SODIUM/NAHCO3/KCL/PEG
1 SOLUTION, RECONSTITUTED, ORAL ORAL
Qty: 1 | Refills: 0 | OUTPATIENT
Start: 2019-01-24

## 2019-01-24 RX ORDER — SODIUM CHLORIDE 9 MG/ML
1000 INJECTION INTRAMUSCULAR; INTRAVENOUS; SUBCUTANEOUS ONCE
Qty: 0 | Refills: 0 | Status: COMPLETED | OUTPATIENT
Start: 2019-01-24 | End: 2019-01-24

## 2019-01-24 RX ADMIN — SODIUM CHLORIDE 1000 MILLILITER(S): 9 INJECTION INTRAMUSCULAR; INTRAVENOUS; SUBCUTANEOUS at 11:19

## 2019-01-24 NOTE — ED PROVIDER NOTE - PROGRESS NOTE DETAILS
CT with some colitis and question underlying colon mass. No pain currently, no fever, normal WBC, unlikely infectious. Encouraged to continue laxatives at home, spoke with his o/p GI who is aware of results and plans for colonoscopy on Monday (Dr. Tony Pozo  [phone]). Patient is aware of results. JEFFREY Aviles MD

## 2019-01-24 NOTE — ED ADULT TRIAGE NOTE - CHIEF COMPLAINT QUOTE
Pt c/o constipation for the past 5 days, scheduled to have colonoscopy on Monday, pt denies abdominal pain, denies n/v/d, afebrile. Pt states he has the urge to defecate but unable to. Pt denies blood in stool, abd soft, non-tender, non-distended.

## 2019-01-24 NOTE — ED PROVIDER NOTE - MEDICAL DECISION MAKING DETAILS
73M with abd cramping and diarrhea. Plan for CT w oral contrast (no IV given prior nephrectomy), labs, IVF.

## 2019-01-24 NOTE — ED PROVIDER NOTE - OBJECTIVE STATEMENT
73M h/o HTN, HLD, partial L nephrectomy presents with abdominal cramping and diarrhea. Patient states over the last month he has noted a change in bowel movements. Reports BMs are small and hard. Last week went to see his GI who prescribed him fiber and MOM and scheduled him for a colonoscopy, which is in 3d from today. Patient reports he had one normal BM after the GI appointment then started to develop watery, small BM. Took two doses of MOM yesterday with a change. Now with abdominal cramping. Has sensation of needing to have a BM and rectal pressure. No fever, no n/v, able to eat normally. No rectal bleeding or melena

## 2019-01-24 NOTE — ED PROVIDER NOTE - PMH
Benign Essential Hypertension    Clinical Depression    Hyperlipidemia    Hyponatremia    Prostate Cancer    Renal Cancer

## 2019-01-24 NOTE — ED PROVIDER NOTE - NSFOLLOWUPINSTRUCTIONS_ED_ALL_ED_FT
You were seen for abdominal pain.  Your labs were reassuring and your imaging studies showed some evidence of constipation and inflammation.  There is also an area concerning for a mass and you should follow up with your GI doctor on Monday as scheduled.  You were given copies of all labs and imaging results from this ER visit, please take them to your appointment.  Return to the ER for worsening symptoms including increased pain, fever, vomiting or any other concerns. You were seen for abdominal pain.  Your labs were reassuring and your imaging studies showed some evidence of constipation and inflammation. You can take the MOM as directed, if you do not have a bowel movement. Use Go Lytely, take 1 glass and wait 1-2 hours, if no success continue drinking one glass at a time until you have a bowel movement.     There is also an area concerning for a mass and you should follow up with your GI doctor on Monday as scheduled.  You were given copies of all labs and imaging results from this ER visit, please take them to your appointment.  Return to the ER for worsening symptoms including increased pain, fever, vomiting or any other concerns.

## 2019-01-25 ENCOUNTER — EMERGENCY (EMERGENCY)
Facility: HOSPITAL | Age: 74
LOS: 1 days | Discharge: HOME | End: 2019-01-25
Attending: EMERGENCY MEDICINE | Admitting: EMERGENCY MEDICINE
Payer: MEDICARE

## 2019-01-25 VITALS
HEART RATE: 84 BPM | OXYGEN SATURATION: 100 % | TEMPERATURE: 98 F | DIASTOLIC BLOOD PRESSURE: 100 MMHG | RESPIRATION RATE: 16 BRPM | SYSTOLIC BLOOD PRESSURE: 177 MMHG

## 2019-01-25 PROCEDURE — 99283 EMERGENCY DEPT VISIT LOW MDM: CPT | Mod: GC,25

## 2019-01-25 RX ORDER — ACETAMINOPHEN 500 MG
975 TABLET ORAL ONCE
Qty: 0 | Refills: 0 | Status: COMPLETED | OUTPATIENT
Start: 2019-01-25 | End: 2019-01-25

## 2019-01-25 RX ORDER — ACETAMINOPHEN 500 MG
1000 TABLET ORAL ONCE
Qty: 0 | Refills: 0 | Status: DISCONTINUED | OUTPATIENT
Start: 2019-01-25 | End: 2019-01-25

## 2019-01-25 RX ADMIN — Medication 975 MILLIGRAM(S): at 03:05

## 2019-01-25 RX ADMIN — Medication 975 MILLIGRAM(S): at 04:05

## 2019-01-25 NOTE — ED PROVIDER NOTE - MEDICAL DECISION MAKING DETAILS
73M hx of colon mass scheduled for colonoscopy in 3 days, was seen in ED ~14 hrs pta, at that time had labs and CT which showed enteritis without other obvious abnormalities was discharged on milk of magnesia, here having pain that he notes is the same as before, has not improved with MOM. Will dose with PO acetaminophen as patient is tolerating PO well, abdominal exam benign at this time but patient may require further evaluation if not pain controlled with PO.

## 2019-01-25 NOTE — ED ADULT TRIAGE NOTE - CHIEF COMPLAINT QUOTE
Patient was seen yesterday in the ED for abdominal pain and constipation, sx not improvement.  Per patient, "I have a mass in my stomach and I am having a colonoscopy on Monday".  Returned to ED for increased abdominal pain, constipation and nausea.  Took 2 doses of milk of magnesia without relief.  Last BM 3 days ago.  No vomiting or fever.  Appears comfortable and in no apparent distress.

## 2019-01-25 NOTE — ED PROVIDER NOTE - OBJECTIVE STATEMENT
73M h/o HTN, HLD, partial L nephrectomy presents with abdominal cramping and diarrhea. Patient states over the last month he has noted a change in bowel movements, subsequently presented to the ED yesterday morning (~14 hours ago) for evaluation, at that time told he had colitis. Reports today that he has had loss of appetite and that yesterday when seen was told not to take any pain medications, and was started on milk of magnesia but that this has not helped his pain. Notes that he was not given anything for his discomfort and now he is having too much pain, states he was told not to take any medications other than those prescribed and that he was not given anything for his pain. Is scheduled for a colonoscopy on Monday, which is in 3 days from today. Denies chest pain, fevers, +chills, no vomiting or nausea, does feel weak.

## 2019-01-25 NOTE — ED PROVIDER NOTE - PHYSICAL EXAMINATION
Gen: NAD, non-toxic, conversational  Eyes: PERRL, EOMI   HENT: Normocephalic, atraumatic. External ears normal, no rhinorrhea, moist mucous membranes.   CV: RRR, no M/R/G  Resp: CTAB, non-labored, speaking without difficulty on room air  Abd: soft, non tender, non rigid, no guarding or rebound tenderness  Back: No CVAT bilaterally, no midline ttp  Skin: dry, wwp   Neuro: AOx3, speech is fluent and appropriate  Psych: Mood good, affect euthymic

## 2019-01-25 NOTE — ED ADULT NURSE NOTE - OBJECTIVE STATEMENT
Pt aa&ox3. Pt was seen yesterday in the ED for abdominal pain and constipation, sx not improvement.  Per patient, "I have a mass in my stomach and I am having a colonoscopy on Monday".  Returned to ED for increased abdominal pain, constipation and nausea.  Took 2 doses of milk of magnesia without relief.  Last BM 3 days ago.  No vomiting or fever.

## 2019-01-25 NOTE — ED PROVIDER NOTE - NSFOLLOWUPINSTRUCTIONS_ED_ALL_ED_FT
As discussed, please take tylenol (up to 650mg every 6 hours as needed for pain), and make sure to keep your appointment with the gastroenterologist (GI doctor) on Monday for your scheduled colonoscopy.    Follow up with your primary doctor as soon as possible.

## 2019-01-25 NOTE — ED PROVIDER NOTE - PROGRESS NOTE DETAILS
Reassessed pt, he states he is currently pain free, instructed him on how to take tylenol and f/u with GI on Monday for colonoscopy.  -Dr. Arroyo

## 2019-01-26 ENCOUNTER — INPATIENT (INPATIENT)
Facility: HOSPITAL | Age: 74
LOS: 5 days | Discharge: HOME HEALTH | End: 2019-02-01
Attending: SURGERY | Admitting: SURGERY
Payer: MEDICARE

## 2019-01-26 ENCOUNTER — TRANSCRIPTION ENCOUNTER (OUTPATIENT)
Age: 74
End: 2019-01-26

## 2019-01-26 VITALS
TEMPERATURE: 98 F | DIASTOLIC BLOOD PRESSURE: 90 MMHG | SYSTOLIC BLOOD PRESSURE: 159 MMHG | HEART RATE: 85 BPM | RESPIRATION RATE: 18 BRPM

## 2019-01-26 LAB
ALBUMIN SERPL ELPH-MCNC: 4.7 G/DL — SIGNIFICANT CHANGE UP (ref 3.3–5)
ALP SERPL-CCNC: 83 U/L — SIGNIFICANT CHANGE UP (ref 40–120)
ALT FLD-CCNC: 17 U/L — SIGNIFICANT CHANGE UP (ref 4–41)
ANION GAP SERPL CALC-SCNC: 13 MMO/L — SIGNIFICANT CHANGE UP (ref 7–14)
AST SERPL-CCNC: 20 U/L — SIGNIFICANT CHANGE UP (ref 4–40)
BASOPHILS # BLD AUTO: 0.04 K/UL — SIGNIFICANT CHANGE UP (ref 0–0.2)
BASOPHILS NFR BLD AUTO: 0.4 % — SIGNIFICANT CHANGE UP (ref 0–2)
BILIRUB SERPL-MCNC: 0.6 MG/DL — SIGNIFICANT CHANGE UP (ref 0.2–1.2)
BUN SERPL-MCNC: 16 MG/DL — SIGNIFICANT CHANGE UP (ref 7–23)
CALCIUM SERPL-MCNC: 9.3 MG/DL — SIGNIFICANT CHANGE UP (ref 8.4–10.5)
CHLORIDE SERPL-SCNC: 94 MMOL/L — LOW (ref 98–107)
CO2 SERPL-SCNC: 27 MMOL/L — SIGNIFICANT CHANGE UP (ref 22–31)
CREAT SERPL-MCNC: 1.16 MG/DL — SIGNIFICANT CHANGE UP (ref 0.5–1.3)
EOSINOPHIL # BLD AUTO: 0.04 K/UL — SIGNIFICANT CHANGE UP (ref 0–0.5)
EOSINOPHIL NFR BLD AUTO: 0.4 % — SIGNIFICANT CHANGE UP (ref 0–6)
GLUCOSE SERPL-MCNC: 119 MG/DL — HIGH (ref 70–99)
HCT VFR BLD CALC: 42.6 % — SIGNIFICANT CHANGE UP (ref 39–50)
HGB BLD-MCNC: 13.6 G/DL — SIGNIFICANT CHANGE UP (ref 13–17)
IMM GRANULOCYTES NFR BLD AUTO: 0.2 % — SIGNIFICANT CHANGE UP (ref 0–1.5)
LYMPHOCYTES # BLD AUTO: 1.57 K/UL — SIGNIFICANT CHANGE UP (ref 1–3.3)
LYMPHOCYTES # BLD AUTO: 15.3 % — SIGNIFICANT CHANGE UP (ref 13–44)
MCHC RBC-ENTMCNC: 30.2 PG — SIGNIFICANT CHANGE UP (ref 27–34)
MCHC RBC-ENTMCNC: 31.9 % — LOW (ref 32–36)
MCV RBC AUTO: 94.7 FL — SIGNIFICANT CHANGE UP (ref 80–100)
MONOCYTES # BLD AUTO: 0.86 K/UL — SIGNIFICANT CHANGE UP (ref 0–0.9)
MONOCYTES NFR BLD AUTO: 8.4 % — SIGNIFICANT CHANGE UP (ref 2–14)
NEUTROPHILS # BLD AUTO: 7.7 K/UL — HIGH (ref 1.8–7.4)
NEUTROPHILS NFR BLD AUTO: 75.3 % — SIGNIFICANT CHANGE UP (ref 43–77)
NRBC # FLD: 0 K/UL — LOW (ref 25–125)
PLATELET # BLD AUTO: 399 K/UL — SIGNIFICANT CHANGE UP (ref 150–400)
PMV BLD: 9.2 FL — SIGNIFICANT CHANGE UP (ref 7–13)
POTASSIUM SERPL-MCNC: 4 MMOL/L — SIGNIFICANT CHANGE UP (ref 3.5–5.3)
POTASSIUM SERPL-SCNC: 4 MMOL/L — SIGNIFICANT CHANGE UP (ref 3.5–5.3)
PROT SERPL-MCNC: 8.1 G/DL — SIGNIFICANT CHANGE UP (ref 6–8.3)
RBC # BLD: 4.5 M/UL — SIGNIFICANT CHANGE UP (ref 4.2–5.8)
RBC # FLD: 11.9 % — SIGNIFICANT CHANGE UP (ref 10.3–14.5)
SODIUM SERPL-SCNC: 134 MMOL/L — LOW (ref 135–145)
WBC # BLD: 10.23 K/UL — SIGNIFICANT CHANGE UP (ref 3.8–10.5)
WBC # FLD AUTO: 10.23 K/UL — SIGNIFICANT CHANGE UP (ref 3.8–10.5)

## 2019-01-26 PROCEDURE — 74177 CT ABD & PELVIS W/CONTRAST: CPT | Mod: 26

## 2019-01-26 RX ORDER — METOCLOPRAMIDE HCL 10 MG
10 TABLET ORAL ONCE
Qty: 0 | Refills: 0 | Status: COMPLETED | OUTPATIENT
Start: 2019-01-26 | End: 2019-01-26

## 2019-01-26 RX ORDER — SODIUM CHLORIDE 9 MG/ML
1000 INJECTION INTRAMUSCULAR; INTRAVENOUS; SUBCUTANEOUS ONCE
Qty: 0 | Refills: 0 | Status: COMPLETED | OUTPATIENT
Start: 2019-01-26 | End: 2019-01-26

## 2019-01-26 RX ORDER — KETOROLAC TROMETHAMINE 30 MG/ML
15 SYRINGE (ML) INJECTION ONCE
Qty: 0 | Refills: 0 | Status: DISCONTINUED | OUTPATIENT
Start: 2019-01-26 | End: 2019-01-26

## 2019-01-26 RX ORDER — MULTIVIT WITH MIN/MFOLATE/K2 340-15/3 G
1 POWDER (GRAM) ORAL ONCE
Qty: 0 | Refills: 0 | Status: COMPLETED | OUTPATIENT
Start: 2019-01-26 | End: 2019-01-26

## 2019-01-26 RX ORDER — ACETAMINOPHEN 500 MG
1000 TABLET ORAL ONCE
Qty: 0 | Refills: 0 | Status: COMPLETED | OUTPATIENT
Start: 2019-01-26 | End: 2019-01-26

## 2019-01-26 RX ADMIN — Medication 1 BOTTLE: at 15:16

## 2019-01-26 RX ADMIN — SODIUM CHLORIDE 1000 MILLILITER(S): 9 INJECTION INTRAMUSCULAR; INTRAVENOUS; SUBCUTANEOUS at 19:51

## 2019-01-26 RX ADMIN — SODIUM CHLORIDE 1000 MILLILITER(S): 9 INJECTION INTRAMUSCULAR; INTRAVENOUS; SUBCUTANEOUS at 15:00

## 2019-01-26 RX ADMIN — Medication 15 MILLIGRAM(S): at 17:26

## 2019-01-26 RX ADMIN — Medication 400 MILLIGRAM(S): at 22:37

## 2019-01-26 RX ADMIN — Medication 15 MILLIGRAM(S): at 19:46

## 2019-01-26 RX ADMIN — Medication 10 MILLIGRAM(S): at 19:51

## 2019-01-26 NOTE — ED ADULT NURSE NOTE - OBJECTIVE STATEMENT
Pt A+OX3 c/o constipation, intermittent abd cramping, and abd distention.  Says he was here Friday for the same and was discharged.  Has an appt. for a colonoscopy on Monday but says he can't wait until then because he's too uncomfortable.  Says pain is intermittent and doesn't have pain presently.  Instructed to call at onset of pain.  States he spoke with his MD and was told to go to ER and to be admitted for colonoscopy.  Labs obtained and sent as ordered.  #20g SL L AC placed.  MD at bedside

## 2019-01-26 NOTE — ED ADULT NURSE NOTE - NSIMPLEMENTINTERV_GEN_ALL_ED
Implemented All Universal Safety Interventions:  [place] to call system. Call bell, personal items and telephone within reach. Instruct patient to call for assistance. Room bathroom lighting operational. Non-slip footwear when patient is off stretcher. Physically safe environment: no spills, clutter or unnecessary equipment. Stretcher in lowest position, wheels locked, appropriate side rails in place.

## 2019-01-26 NOTE — ED PROVIDER NOTE - OBJECTIVE STATEMENT
74 yo male presenting with crampy intermittent diffuse abdominal pain since thursday night.  patient states that he has been having difficulty having a bowel movement.  has been drinking milk of magnesia which results in small bowel movements but patient feels bloated.  scheduled for colonoscopy on monday.  patient passing gas, last time this morning.

## 2019-01-26 NOTE — ED PROVIDER NOTE - PHYSICAL EXAMINATION
gen: well appearing  Mentation: AAO x 3  psych: mood appropriate  ENT: airway patent  Eyes: conjunctivae clear bilaterally  Cardio: RRR, no m/r/g  Resp: normal BS b/l  GI: s/nt/distended   Neuro: AAO x 3, sensation and motor function intact  Skin: No evidence of rash  MSK: normal movement of all extremities

## 2019-01-26 NOTE — ED PROVIDER NOTE - ATTENDING CONTRIBUTION TO CARE
73M h/o prostate and kidney cancer presents with constipation and abdominal pain. Reports ongoing intermittent crampy abdominal pain, small bowel movements. Change in BM over the last month, seen by GI who started him on MOM, without relief. Has been seen twice in the past two days, had CT and labs on the first day (seen by me) that showed concern for colon cancer with some colitis, no abx given no fever. Was rx’d golytely but didn’t take it. Now increased pain, no BM, passing gas, feels bloated. On exam well appearing, nad, mmm, lungs clear, rrr, abd distended with diffuse ttp, no rash, no edema, 2+ pulses, no focal neuro deficits. Significantly increased pain and distention from 3d ago, plan for repeat labs and imaging. Will aggressively treat constipation. Likely CDU.

## 2019-01-26 NOTE — ED PROVIDER NOTE - MEDICAL DECISION MAKING DETAILS
72 yo male presenting with constipation; likely constipation from new colon mass; low concern for bowel obstruction as patient is passing gas and had ct few days ago; enema re eval

## 2019-01-26 NOTE — ED ADULT TRIAGE NOTE - CHIEF COMPLAINT QUOTE
Pt c/o mass in his colon--scheduled for colonscopy on monday--pt states he has taken MOM and other laxitives and cant go  Pt c/o abdominal pain

## 2019-01-27 ENCOUNTER — RESULT REVIEW (OUTPATIENT)
Age: 74
End: 2019-01-27

## 2019-01-27 DIAGNOSIS — Z90.79 ACQUIRED ABSENCE OF OTHER GENITAL ORGAN(S): Chronic | ICD-10-CM

## 2019-01-27 DIAGNOSIS — K56.609 UNSPECIFIED INTESTINAL OBSTRUCTION, UNSPECIFIED AS TO PARTIAL VERSUS COMPLETE OBSTRUCTION: ICD-10-CM

## 2019-01-27 DIAGNOSIS — Z90.5 ACQUIRED ABSENCE OF KIDNEY: Chronic | ICD-10-CM

## 2019-01-27 LAB
ANION GAP SERPL CALC-SCNC: 10 MMO/L — SIGNIFICANT CHANGE UP (ref 7–14)
ANION GAP SERPL CALC-SCNC: 14 MMO/L — SIGNIFICANT CHANGE UP (ref 7–14)
APTT BLD: 29.3 SEC — SIGNIFICANT CHANGE UP (ref 27.5–36.3)
BUN SERPL-MCNC: 20 MG/DL — SIGNIFICANT CHANGE UP (ref 7–23)
BUN SERPL-MCNC: 21 MG/DL — SIGNIFICANT CHANGE UP (ref 7–23)
CALCIUM SERPL-MCNC: 7.6 MG/DL — LOW (ref 8.4–10.5)
CALCIUM SERPL-MCNC: 8.5 MG/DL — SIGNIFICANT CHANGE UP (ref 8.4–10.5)
CHLORIDE SERPL-SCNC: 102 MMOL/L — SIGNIFICANT CHANGE UP (ref 98–107)
CHLORIDE SERPL-SCNC: 98 MMOL/L — SIGNIFICANT CHANGE UP (ref 98–107)
CO2 SERPL-SCNC: 25 MMOL/L — SIGNIFICANT CHANGE UP (ref 22–31)
CO2 SERPL-SCNC: 27 MMOL/L — SIGNIFICANT CHANGE UP (ref 22–31)
CREAT SERPL-MCNC: 1.14 MG/DL — SIGNIFICANT CHANGE UP (ref 0.5–1.3)
CREAT SERPL-MCNC: 1.15 MG/DL — SIGNIFICANT CHANGE UP (ref 0.5–1.3)
GLUCOSE BLDC GLUCOMTR-MCNC: 128 MG/DL — HIGH (ref 70–99)
GLUCOSE BLDC GLUCOMTR-MCNC: 145 MG/DL — HIGH (ref 70–99)
GLUCOSE BLDC GLUCOMTR-MCNC: 155 MG/DL — HIGH (ref 70–99)
GLUCOSE SERPL-MCNC: 125 MG/DL — HIGH (ref 70–99)
GLUCOSE SERPL-MCNC: 158 MG/DL — HIGH (ref 70–99)
HBA1C BLD-MCNC: 6.3 % — HIGH (ref 4–5.6)
HCT VFR BLD CALC: 37.3 % — LOW (ref 39–50)
HCT VFR BLD CALC: 38.9 % — LOW (ref 39–50)
HGB BLD-MCNC: 12.4 G/DL — LOW (ref 13–17)
HGB BLD-MCNC: 12.4 G/DL — LOW (ref 13–17)
INR BLD: 1.06 — SIGNIFICANT CHANGE UP (ref 0.88–1.17)
MAGNESIUM SERPL-MCNC: 2.5 MG/DL — SIGNIFICANT CHANGE UP (ref 1.6–2.6)
MAGNESIUM SERPL-MCNC: 3.2 MG/DL — HIGH (ref 1.6–2.6)
MCHC RBC-ENTMCNC: 30.2 PG — SIGNIFICANT CHANGE UP (ref 27–34)
MCHC RBC-ENTMCNC: 31 PG — SIGNIFICANT CHANGE UP (ref 27–34)
MCHC RBC-ENTMCNC: 31.9 % — LOW (ref 32–36)
MCHC RBC-ENTMCNC: 33.2 % — SIGNIFICANT CHANGE UP (ref 32–36)
MCV RBC AUTO: 93.3 FL — SIGNIFICANT CHANGE UP (ref 80–100)
MCV RBC AUTO: 94.6 FL — SIGNIFICANT CHANGE UP (ref 80–100)
NRBC # FLD: 0 K/UL — LOW (ref 25–125)
NRBC # FLD: 0 K/UL — LOW (ref 25–125)
PHOSPHATE SERPL-MCNC: 2.9 MG/DL — SIGNIFICANT CHANGE UP (ref 2.5–4.5)
PHOSPHATE SERPL-MCNC: 3.7 MG/DL — SIGNIFICANT CHANGE UP (ref 2.5–4.5)
PLATELET # BLD AUTO: 344 K/UL — SIGNIFICANT CHANGE UP (ref 150–400)
PLATELET # BLD AUTO: 351 K/UL — SIGNIFICANT CHANGE UP (ref 150–400)
PMV BLD: 9.3 FL — SIGNIFICANT CHANGE UP (ref 7–13)
PMV BLD: 9.4 FL — SIGNIFICANT CHANGE UP (ref 7–13)
POTASSIUM SERPL-MCNC: 4.1 MMOL/L — SIGNIFICANT CHANGE UP (ref 3.5–5.3)
POTASSIUM SERPL-MCNC: 4.4 MMOL/L — SIGNIFICANT CHANGE UP (ref 3.5–5.3)
POTASSIUM SERPL-SCNC: 4.1 MMOL/L — SIGNIFICANT CHANGE UP (ref 3.5–5.3)
POTASSIUM SERPL-SCNC: 4.4 MMOL/L — SIGNIFICANT CHANGE UP (ref 3.5–5.3)
PROTHROM AB SERPL-ACNC: 11.8 SEC — SIGNIFICANT CHANGE UP (ref 9.8–13.1)
RBC # BLD: 4 M/UL — LOW (ref 4.2–5.8)
RBC # BLD: 4.11 M/UL — LOW (ref 4.2–5.8)
RBC # FLD: 12 % — SIGNIFICANT CHANGE UP (ref 10.3–14.5)
RBC # FLD: 12.2 % — SIGNIFICANT CHANGE UP (ref 10.3–14.5)
SODIUM SERPL-SCNC: 137 MMOL/L — SIGNIFICANT CHANGE UP (ref 135–145)
SODIUM SERPL-SCNC: 139 MMOL/L — SIGNIFICANT CHANGE UP (ref 135–145)
WBC # BLD: 4.64 K/UL — SIGNIFICANT CHANGE UP (ref 3.8–10.5)
WBC # BLD: 8.99 K/UL — SIGNIFICANT CHANGE UP (ref 3.8–10.5)
WBC # FLD AUTO: 4.64 K/UL — SIGNIFICANT CHANGE UP (ref 3.8–10.5)
WBC # FLD AUTO: 8.99 K/UL — SIGNIFICANT CHANGE UP (ref 3.8–10.5)

## 2019-01-27 PROCEDURE — 76000 FLUOROSCOPY <1 HR PHYS/QHP: CPT | Mod: 26,GC,59

## 2019-01-27 PROCEDURE — 44206 LAP PART COLECTOMY W/STOMA: CPT | Mod: GC

## 2019-01-27 PROCEDURE — 88307 TISSUE EXAM BY PATHOLOGIST: CPT | Mod: 26

## 2019-01-27 PROCEDURE — 93010 ELECTROCARDIOGRAM REPORT: CPT

## 2019-01-27 PROCEDURE — 99223 1ST HOSP IP/OBS HIGH 75: CPT | Mod: GC,57

## 2019-01-27 PROCEDURE — 45330 DIAGNOSTIC SIGMOIDOSCOPY: CPT | Mod: GC

## 2019-01-27 PROCEDURE — 49000 EXPLORATION OF ABDOMEN: CPT | Mod: GC

## 2019-01-27 RX ORDER — ACETAMINOPHEN 500 MG
1000 TABLET ORAL ONCE
Qty: 0 | Refills: 0 | Status: DISCONTINUED | OUTPATIENT
Start: 2019-01-27 | End: 2019-01-29

## 2019-01-27 RX ORDER — DIPHENHYDRAMINE HCL 50 MG
25 CAPSULE ORAL EVERY 4 HOURS
Qty: 0 | Refills: 0 | Status: DISCONTINUED | OUTPATIENT
Start: 2019-01-27 | End: 2019-01-29

## 2019-01-27 RX ORDER — SODIUM CHLORIDE 9 MG/ML
1000 INJECTION, SOLUTION INTRAVENOUS
Qty: 0 | Refills: 0 | Status: DISCONTINUED | OUTPATIENT
Start: 2019-01-27 | End: 2019-01-27

## 2019-01-27 RX ORDER — ACETAMINOPHEN 500 MG
1000 TABLET ORAL ONCE
Qty: 0 | Refills: 0 | Status: COMPLETED | OUTPATIENT
Start: 2019-01-28 | End: 2019-01-28

## 2019-01-27 RX ORDER — SODIUM CHLORIDE 9 MG/ML
1000 INJECTION, SOLUTION INTRAVENOUS
Qty: 0 | Refills: 0 | Status: DISCONTINUED | OUTPATIENT
Start: 2019-01-27 | End: 2019-02-01

## 2019-01-27 RX ORDER — ONDANSETRON 8 MG/1
4 TABLET, FILM COATED ORAL EVERY 6 HOURS
Qty: 0 | Refills: 0 | Status: DISCONTINUED | OUTPATIENT
Start: 2019-01-27 | End: 2019-01-29

## 2019-01-27 RX ORDER — DEXTROSE 50 % IN WATER 50 %
25 SYRINGE (ML) INTRAVENOUS ONCE
Qty: 0 | Refills: 0 | Status: DISCONTINUED | OUTPATIENT
Start: 2019-01-27 | End: 2019-02-01

## 2019-01-27 RX ORDER — METOPROLOL TARTRATE 50 MG
2.5 TABLET ORAL EVERY 6 HOURS
Qty: 0 | Refills: 0 | Status: DISCONTINUED | OUTPATIENT
Start: 2019-01-27 | End: 2019-01-28

## 2019-01-27 RX ORDER — DEXTROSE 50 % IN WATER 50 %
12.5 SYRINGE (ML) INTRAVENOUS ONCE
Qty: 0 | Refills: 0 | Status: DISCONTINUED | OUTPATIENT
Start: 2019-01-27 | End: 2019-02-01

## 2019-01-27 RX ORDER — GLUCAGON INJECTION, SOLUTION 0.5 MG/.1ML
1 INJECTION, SOLUTION SUBCUTANEOUS ONCE
Qty: 0 | Refills: 0 | Status: DISCONTINUED | OUTPATIENT
Start: 2019-01-27 | End: 2019-02-01

## 2019-01-27 RX ORDER — ENOXAPARIN SODIUM 100 MG/ML
40 INJECTION SUBCUTANEOUS EVERY 24 HOURS
Qty: 0 | Refills: 0 | Status: DISCONTINUED | OUTPATIENT
Start: 2019-01-27 | End: 2019-02-01

## 2019-01-27 RX ORDER — HYDROMORPHONE HYDROCHLORIDE 2 MG/ML
30 INJECTION INTRAMUSCULAR; INTRAVENOUS; SUBCUTANEOUS
Qty: 0 | Refills: 0 | Status: DISCONTINUED | OUTPATIENT
Start: 2019-01-27 | End: 2019-01-29

## 2019-01-27 RX ORDER — HYDROMORPHONE HYDROCHLORIDE 2 MG/ML
0.5 INJECTION INTRAMUSCULAR; INTRAVENOUS; SUBCUTANEOUS
Qty: 0 | Refills: 0 | Status: DISCONTINUED | OUTPATIENT
Start: 2019-01-27 | End: 2019-01-29

## 2019-01-27 RX ORDER — HYDROMORPHONE HYDROCHLORIDE 2 MG/ML
0.5 INJECTION INTRAMUSCULAR; INTRAVENOUS; SUBCUTANEOUS
Qty: 0 | Refills: 0 | Status: DISCONTINUED | OUTPATIENT
Start: 2019-01-27 | End: 2019-01-28

## 2019-01-27 RX ORDER — DEXTROSE 50 % IN WATER 50 %
15 SYRINGE (ML) INTRAVENOUS ONCE
Qty: 0 | Refills: 0 | Status: DISCONTINUED | OUTPATIENT
Start: 2019-01-27 | End: 2019-02-01

## 2019-01-27 RX ORDER — ONDANSETRON 8 MG/1
4 TABLET, FILM COATED ORAL ONCE
Qty: 0 | Refills: 0 | Status: DISCONTINUED | OUTPATIENT
Start: 2019-01-27 | End: 2019-02-01

## 2019-01-27 RX ORDER — SODIUM CHLORIDE 9 MG/ML
1000 INJECTION, SOLUTION INTRAVENOUS
Qty: 0 | Refills: 0 | Status: DISCONTINUED | OUTPATIENT
Start: 2019-01-27 | End: 2019-01-28

## 2019-01-27 RX ORDER — CEFOTETAN DISODIUM 1 G
1 VIAL (EA) INJECTION EVERY 12 HOURS
Qty: 0 | Refills: 0 | Status: COMPLETED | OUTPATIENT
Start: 2019-01-27 | End: 2019-01-28

## 2019-01-27 RX ORDER — INSULIN LISPRO 100/ML
VIAL (ML) SUBCUTANEOUS EVERY 6 HOURS
Qty: 0 | Refills: 0 | Status: DISCONTINUED | OUTPATIENT
Start: 2019-01-27 | End: 2019-01-28

## 2019-01-27 RX ORDER — NALOXONE HYDROCHLORIDE 4 MG/.1ML
0.1 SPRAY NASAL
Qty: 0 | Refills: 0 | Status: DISCONTINUED | OUTPATIENT
Start: 2019-01-27 | End: 2019-01-29

## 2019-01-27 RX ADMIN — Medication 2.5 MILLIGRAM(S): at 19:00

## 2019-01-27 RX ADMIN — HYDROMORPHONE HYDROCHLORIDE 30 MILLILITER(S): 2 INJECTION INTRAMUSCULAR; INTRAVENOUS; SUBCUTANEOUS at 17:47

## 2019-01-27 RX ADMIN — SODIUM CHLORIDE 125 MILLILITER(S): 9 INJECTION, SOLUTION INTRAVENOUS at 19:10

## 2019-01-27 RX ADMIN — Medication 2.5 MILLIGRAM(S): at 08:32

## 2019-01-27 RX ADMIN — Medication 1: at 23:58

## 2019-01-27 RX ADMIN — SODIUM CHLORIDE 125 MILLILITER(S): 9 INJECTION, SOLUTION INTRAVENOUS at 12:10

## 2019-01-27 RX ADMIN — Medication 100 GRAM(S): at 22:44

## 2019-01-27 RX ADMIN — HYDROMORPHONE HYDROCHLORIDE 30 MILLILITER(S): 2 INJECTION INTRAMUSCULAR; INTRAVENOUS; SUBCUTANEOUS at 20:37

## 2019-01-27 NOTE — CONSULT NOTE ADULT - SUBJECTIVE AND OBJECTIVE BOX
Chief Complaint:  Patient is a 73y old  Male who presents with a chief complaint of partial large bowel obstruction (27 Jan 2019 00:45)      HPI:  The patient is a 72yo M with PMHx of prostate ca s/p resection (2000), left kidney cancer s/p L partial nephrectomy (2000), HLD, HTN, DM who presented to the ED for the third day in a row with complaint of crampy abdominal pain and constipation.  Of note the patient has been passing gas this entire time.  On arrival to the ED this time he had a CT which showed "At least partial large bowel obstruction to the level of the distal descending colon with pericolonic adenopathy concerning for underlying malignancy. Consider further evaluation with colonoscopy." and GI was consulted by surgical team for emergent colonic stent placement.      Of note the patients states his last colonoscopy was in 2010 by Dr. Bauer and reports history of benign polyps that were removed. The patient was planned for outpatient colonoscopy Monday with his GI doctor due to a CT on 1/24 which showed thickening in the rectum concerning for malignancy.     Allergies:  No Known Allergies      Home Medications:    Hospital Medications:  dextrose 40% Gel 15 Gram(s) Oral once PRN  dextrose 5%. 1000 milliLiter(s) IV Continuous <Continuous>  dextrose 50% Injectable 12.5 Gram(s) IV Push once  dextrose 50% Injectable 25 Gram(s) IV Push once  dextrose 50% Injectable 25 Gram(s) IV Push once  enoxaparin Injectable 40 milliGRAM(s) SubCutaneous every 24 hours  glucagon  Injectable 1 milliGRAM(s) IntraMuscular once PRN  insulin lispro (HumaLOG) corrective regimen sliding scale   SubCutaneous every 6 hours  lactated ringers. 1000 milliLiter(s) IV Continuous <Continuous>  metoprolol tartrate Injectable 2.5 milliGRAM(s) IV Push every 6 hours      PMHX/PSHX:  Prostate Cancer  Renal Cancer  Hyponatremia  Clinical Depression  Hyperlipidemia  Benign Essential Hypertension  H/O prostatectomy  H/O partial nephrectomy  No significant past surgical history      Family history:    none    Social History:   none    ROS:     General:  No wt loss, fevers, chills, night sweats, fatigue,   Eyes:  Good vision, no reported pain  ENT:  No sore throat, pain, runny nose, dysphagia  CV:  No pain, palpitations, hypo/hypertension  Resp:  No dyspnea, cough, tachypnea, wheezing  GI:  See HPI  :  No pain, bleeding, incontinence, nocturia  Muscle:  No pain, weakness  Neuro:  No weakness, tingling, memory problems  Psych:  No fatigue, insomnia, mood problems, depression  Endocrine:  No polyuria, polydipsia, cold/heat intolerance  Heme:  No petechiae, ecchymosis, easy bruisability  Skin:  No rash, edema      PHYSICAL EXAM:     GENERAL:  Appears stated age, well-groomed, well-nourished, NAD  CHEST:  Full & symmetric excursion  HEART:  Regular rhythm, no abdominal bruit, no edema  ABDOMEN:  Soft, non-tender, non-distended, normoactive bowel sounds,  no masses , no hepatosplenomegaly  EXTREMITIES:  no cyanosis,clubbing or edema  SKIN:  No rash/erythema/ecchymoses/petechiae/wounds/abscess/warm/dry  NEURO:  Alert, oriented    Vital Signs:  Vital Signs Last 24 Hrs  T(C): 37.2 (27 Jan 2019 01:43), Max: 37.6 (27 Jan 2019 00:07)  T(F): 98.9 (27 Jan 2019 01:43), Max: 99.6 (27 Jan 2019 00:07)  HR: 81 (27 Jan 2019 01:43) (78 - 93)  BP: 160/99 (27 Jan 2019 01:43) (155/87 - 171/87)  BP(mean): --  RR: 18 (27 Jan 2019 01:43) (16 - 20)  SpO2: 99% (27 Jan 2019 01:43) (99% - 100%)  Daily     Daily     LABS:                        13.6   10.23 )-----------( 399      ( 26 Jan 2019 14:45 )             42.6     01-26    134<L>  |  94<L>  |  16  ----------------------------<  119<H>  4.0   |  27  |  1.16    Ca    9.3      26 Jan 2019 14:45    TPro  8.1  /  Alb  4.7  /  TBili  0.6  /  DBili  x   /  AST  20  /  ALT  17  /  AlkPhos  83  01-26    LIVER FUNCTIONS - ( 26 Jan 2019 14:45 )  Alb: 4.7 g/dL / Pro: 8.1 g/dL / ALK PHOS: 83 u/L / ALT: 17 u/L / AST: 20 u/L / GGT: x                   Imaging: Chief Complaint:  Patient is a 73y old  Male who presents with a chief complaint of partial large bowel obstruction (27 Jan 2019 00:45)      HPI:  The patient is a 74yo M with PMHx of prostate ca s/p resection (2000), left kidney cancer s/p L partial nephrectomy (2000), HLD, HTN, DM who presented to the ED for the third day in a row with complaint of crampy abdominal pain and constipation.  Of note the patient has been passing gas this entire time.  On arrival to the ED this time he had a CT which showed "At least partial large bowel obstruction to the level of the distal descending colon with pericolonic adenopathy concerning for underlying malignancy. Consider further evaluation with colonoscopy." and GI was consulted by surgical team for emergent colonic stent placement.      Of note the patients states his last colonoscopy was in 2010 by Dr. Bauer and reports history of benign polyps that were removed. The patient was planned for outpatient colonoscopy Monday with his GI doctor due to a CT on 1/24 which showed thickening in the rectum concerning for malignancy. Per patient since he has been here he has been passing liquid in addition to gas.    Allergies:  No Known Allergies      Home Medications:    Hospital Medications:  dextrose 40% Gel 15 Gram(s) Oral once PRN  dextrose 5%. 1000 milliLiter(s) IV Continuous <Continuous>  dextrose 50% Injectable 12.5 Gram(s) IV Push once  dextrose 50% Injectable 25 Gram(s) IV Push once  dextrose 50% Injectable 25 Gram(s) IV Push once  enoxaparin Injectable 40 milliGRAM(s) SubCutaneous every 24 hours  glucagon  Injectable 1 milliGRAM(s) IntraMuscular once PRN  insulin lispro (HumaLOG) corrective regimen sliding scale   SubCutaneous every 6 hours  lactated ringers. 1000 milliLiter(s) IV Continuous <Continuous>  metoprolol tartrate Injectable 2.5 milliGRAM(s) IV Push every 6 hours      PMHX/PSHX:  Prostate Cancer  Renal Cancer  Hyponatremia  Clinical Depression  Hyperlipidemia  Benign Essential Hypertension  H/O prostatectomy  H/O partial nephrectomy  No significant past surgical history      Family history:    none    Social History:   none    ROS:     General:  No wt loss, fevers, chills, night sweats, fatigue,   Eyes:  Good vision, no reported pain  ENT:  No sore throat, pain, runny nose, dysphagia  CV:  No pain, palpitations, hypo/hypertension  Resp:  No dyspnea, cough, tachypnea, wheezing  GI:  See HPI  :  No pain, bleeding, incontinence, nocturia  Muscle:  No pain, weakness  Neuro:  No weakness, tingling, memory problems  Psych:  No fatigue, insomnia, mood problems, depression  Endocrine:  No polyuria, polydipsia, cold/heat intolerance  Heme:  No petechiae, ecchymosis, easy bruisability  Skin:  No rash, edema      PHYSICAL EXAM:     GENERAL:  Appears stated age, well-groomed, well-nourished, NAD  CHEST:  Full & symmetric excursion  HEART:  Regular rhythm, no abdominal bruit, no edema  ABDOMEN:  Soft, distended but soft and not particularly tender, normoactive bowel sounds,  no masses , no hepatosplenomegaly  EXTREMITIES:  no cyanosis,clubbing or edema  SKIN:  No rash/erythema/ecchymoses/petechiae/wounds/abscess/warm/dry  NEURO:  Alert, oriented    Vital Signs:  Vital Signs Last 24 Hrs  T(C): 37.2 (27 Jan 2019 01:43), Max: 37.6 (27 Jan 2019 00:07)  T(F): 98.9 (27 Jan 2019 01:43), Max: 99.6 (27 Jan 2019 00:07)  HR: 81 (27 Jan 2019 01:43) (78 - 93)  BP: 160/99 (27 Jan 2019 01:43) (155/87 - 171/87)  BP(mean): --  RR: 18 (27 Jan 2019 01:43) (16 - 20)  SpO2: 99% (27 Jan 2019 01:43) (99% - 100%)  Daily     Daily     LABS:                        13.6   10.23 )-----------( 399      ( 26 Jan 2019 14:45 )             42.6     01-26    134<L>  |  94<L>  |  16  ----------------------------<  119<H>  4.0   |  27  |  1.16    Ca    9.3      26 Jan 2019 14:45    TPro  8.1  /  Alb  4.7  /  TBili  0.6  /  DBili  x   /  AST  20  /  ALT  17  /  AlkPhos  83  01-26    LIVER FUNCTIONS - ( 26 Jan 2019 14:45 )  Alb: 4.7 g/dL / Pro: 8.1 g/dL / ALK PHOS: 83 u/L / ALT: 17 u/L / AST: 20 u/L / GGT: x                   Imaging:

## 2019-01-27 NOTE — H&P ADULT - NSHPLABSRESULTS_GEN_ALL_CORE
CBC Full  -  ( 26 Jan 2019 14:45 )  WBC Count : 10.23 K/uL  Hemoglobin : 13.6 g/dL  Hematocrit : 42.6 %  Platelet Count - Automated : 399 K/uL  Mean Cell Volume : 94.7 fL  Mean Cell Hemoglobin : 30.2 pg  Mean Cell Hemoglobin Concentration : 31.9 %  Auto Neutrophil # : 7.70 K/uL  Auto Lymphocyte # : 1.57 K/uL  Auto Monocyte # : 0.86 K/uL  Auto Eosinophil # : 0.04 K/uL  Auto Basophil # : 0.04 K/uL  Auto Neutrophil % : 75.3 %  Auto Lymphocyte % : 15.3 %  Auto Monocyte % : 8.4 %  Auto Eosinophil % : 0.4 %  Auto Basophil % : 0.4 %      01-26    134<L>  |  94<L>  |  16  ----------------------------<  119<H>  4.0   |  27  |  1.16    Ca    9.3      26 Jan 2019 14:45    TPro  8.1  /  Alb  4.7  /  TBili  0.6  /  DBili  x   /  AST  20  /  ALT  17  /  AlkPhos  83  01-26      Imaging    < from: CT Abdomen and Pelvis w/ Oral Cont and w/ IV Cont (01.26.19 @ 18:52) >    FINDINGS:    LOWER CHEST: Bibasilar subsegmental atelectasis. Coronary calcifications.    LIVER: Left hepatic cyst.  BILE DUCTS: Normal caliber.  GALLBLADDER: Within normal limits.  SPLEEN: Within normal limits.  PANCREAS: Within normal limits.  ADRENALS: Within normal limits.  KIDNEYS/URETERS: Malrotated left kidney. Right renal cysts. Bilateral   hypodense renal foci, too small to characterize.    BLADDER: Within normal limits.  REPRODUCTIVE ORGANS: Prostatectomy.    BOWEL: Appendix is normal. Diverticulosis. Diffusely stool filled dilated   large bowel, to the level of the previously seen eccentric wall   thickening in the distal descending colon with relatively decompressed   sigmoid and rectum redemonstrated wall thickening and adjacent fat   stranding of the distaldescending colon.  PERITONEUM: Trace free fluid in the bilateral paracolic gutters.  VESSELS:  Atherosclerotic changes.  RETROPERITONEUM: Redemonstrated enlarged pericolonic lymph nodes, the   largest measuring 1.2 cm, indeterminate.    ABDOMINAL WALL: Within normal limits.  BONES: Degenerative changes.    IMPRESSION:   At least partial large bowel obstruction to the level of the distal   descending colon with pericolonic adenopathy concerning for underlying   malignancy. Consider further evaluation with colonoscopy.      < end of copied text >

## 2019-01-27 NOTE — PRE-OP CHECKLIST - SELECT TESTS ORDERED
BMP/CBC/PT/PTT/Type and Screen/INR/EKG/Type and Cross POCT Blood Glucose/EKG/CBC/INR/Type and Cross/BMP/PT/PTT/Type and Screen/128

## 2019-01-27 NOTE — CHART NOTE - NSCHARTNOTEFT_GEN_A_CORE
General Surgery Post-Op Note    SUBJECTIVE:  This is a 73y Male POD 0 s/p ex laparotomy, transverse colectomy, end colostomy (R) and mucous fistula (L) formation. Reyes in place. C/o throat irritation and voice change poss 2/2 intubation. Denies N/V, CP, SOB.     OBJECTIVE:     ** VITAL SIGNS / I&O's **    Vital Signs Last 24 Hrs  T(C): 36.7 (27 Jan 2019 19:00), Max: 37.6 (27 Jan 2019 00:07)  T(F): 98.1 (27 Jan 2019 19:00), Max: 99.6 (27 Jan 2019 00:07)  HR: 86 (27 Jan 2019 19:30) (72 - 86)  BP: 164/78 (27 Jan 2019 19:30) (149/72 - 183/79)  BP(mean): 102 (27 Jan 2019 19:30) (91 - 105)  RR: 17 (27 Jan 2019 19:30) (12 - 18)  SpO2: 95% (27 Jan 2019 19:30) (95% - 100%)      26 Jan 2019 07:01  -  27 Jan 2019 07:00  --------------------------------------------------------  IN:    lactated ringers.: 750 mL  Total IN: 750 mL    OUT:  Total OUT: 0 mL    Total NET: 750 mL      27 Jan 2019 07:01  -  27 Jan 2019 22:21  --------------------------------------------------------  IN:    lactated ringers.: 250 mL    lactated ringers.: 500 mL  Total IN: 750 mL    OUT:    Indwelling Catheter - Urethral: 275 mL  Total OUT: 275 mL    Total NET: 475 mL          ** PHYSICAL EXAM **    -- CONSTITUTIONAL: Alert, in NAD  -- PULMONARY: non-labored respirations  -- ABDOMEN: soft, moderately distended, appropriately TTP, midline dressing c/d/i, end colostomy on R, mucous fistula on L, both have serosanguinous output  -- NEURO: A&Ox3    ** LABS **                          12.4   4.64  )-----------( 351      ( 27 Jan 2019 17:45 )             38.9     27 Jan 2019 17:45    139    |  102    |  21     ----------------------------<  158    4.4     |  27     |  1.15     Ca    7.6        27 Jan 2019 17:45  Phos  3.7       27 Jan 2019 17:45  Mg     2.5       27 Jan 2019 17:45    TPro  8.1    /  Alb  4.7    /  TBili  0.6    /  DBili  x      /  AST  20     /  ALT  17     /  AlkPhos  83     26 Jan 2019 14:45    PT/INR - ( 27 Jan 2019 09:30 )   PT: 11.8 SEC;   INR: 1.06          PTT - ( 27 Jan 2019 09:30 )  PTT:29.3 SEC  CAPILLARY BLOOD GLUCOSE      POCT Blood Glucose.: 145 mg/dL (27 Jan 2019 17:42)  POCT Blood Glucose.: 128 mg/dL (27 Jan 2019 11:52)  POCT Blood Glucose.: 128 mg/dL (27 Jan 2019 06:38)        LIVER FUNCTIONS - ( 26 Jan 2019 14:45 )  Alb: 4.7 g/dL / Pro: 8.1 g/dL / ALK PHOS: 83 u/L / ALT: 17 u/L / AST: 20 u/L / GGT: x                 MEDICATIONS  (STANDING):  acetaminophen  IVPB .. 1000 milliGRAM(s) IV Intermittent once  cefoTEtan  IVPB 1 Gram(s) IV Intermittent every 12 hours  dextrose 5%. 1000 milliLiter(s) (50 mL/Hr) IV Continuous <Continuous>  dextrose 50% Injectable 12.5 Gram(s) IV Push once  dextrose 50% Injectable 25 Gram(s) IV Push once  dextrose 50% Injectable 25 Gram(s) IV Push once  enoxaparin Injectable 40 milliGRAM(s) SubCutaneous every 24 hours  HYDROmorphone PCA (1 mG/mL) 30 milliLiter(s) PCA Continuous PCA Continuous  insulin lispro (HumaLOG) corrective regimen sliding scale   SubCutaneous every 6 hours  lactated ringers. 1000 milliLiter(s) (125 mL/Hr) IV Continuous <Continuous>  metoprolol tartrate Injectable 2.5 milliGRAM(s) IV Push every 6 hours    MEDICATIONS  (PRN):  dextrose 40% Gel 15 Gram(s) Oral once PRN Blood Glucose LESS THAN 70 milliGRAM(s)/deciliter  diphenhydrAMINE   Injectable 25 milliGRAM(s) IV Push every 4 hours PRN Pruritus  glucagon  Injectable 1 milliGRAM(s) IntraMuscular once PRN Glucose LESS THAN 70 milligrams/deciliter  HYDROmorphone  Injectable 0.5 milliGRAM(s) IV Push every 10 minutes PRN Severe Pain (7 - 10)  HYDROmorphone PCA (1 mG/mL) Rescue Clinician Bolus 0.5 milliGRAM(s) IV Push every 15 minutes PRN for Pain Scale GREATER THAN 6  naloxone Injectable 0.1 milliGRAM(s) IV Push every 3 minutes PRN For ANY of the following changes in patient status:  A. RR LESS THAN 10 breaths per minute, B. Oxygen saturation LESS THAN 90%, C. Sedation score of 6  ondansetron Injectable 4 milliGRAM(s) IV Push every 6 hours PRN Nausea  ondansetron Injectable 4 milliGRAM(s) IV Push once PRN Nausea and/or Vomiting        Assessment:  This is a 73yMale POD 0 s/p ex laparotomy, transverse colectomy, end colostomy (R) and mucous fistula (L) formation    Plan:  - Diet: NPO  - Monitor reyes output  - Monitor end colostomy and mucous fistula output  - OOB, ambulate as tolerated  - Encourage use of IS  - Monitor dressing  - DVT ppx: inna Rodriguez, PGY-1  [facility] General Surgery- B Team  [id number]

## 2019-01-27 NOTE — CONSULT NOTE ADULT - ASSESSMENT
Impression:  1) Constipation - with noted partial large bowel obstruction CT with thickening on CT with patient currently passing gas.  CT findings most concerning for malignancy but ddx also includes but is much less likely colitis of other etiology of benign stenosis.    Recommendations:   - no indication for emergent intervention at this time   - serial abdominal X-rays   - as patient is fully obstructed would try giving patient Golytely if he is able to tolerate    - supportive care per primary team   - case to be discussed with advanced team    Peggy Argueta, PGY-4  Gastroenterology Fellow  Pager x [phone] or [phone]  (After 5 pm or on weekends please page GI on call) Impression:  1) Constipation - with noted partial large bowel obstruction CT with thickening on CT with patient currently passing gas.  CT findings most concerning for malignancy but ddx also includes but is much less likely colitis of other etiology of benign stenosis. CT reviewed with Dr. Rico in radiology showing narrowing stricture most likely at the rectosigmoid junction.      Recommendations:   - OR for colonic stent placement   - serial abdominal X-rays   - as patient is fully obstructed would try giving patient Golytely if he is able to tolerate    - supportive care per primary team   - case to be discussed with advanced team    Peggy Argueta, PGY-4  Gastroenterology Fellow  Pager x [phone] or [phone]  (After 5 pm or on weekends please page GI on call) Impression:  1) Constipation - with noted partial large bowel obstruction CT with thickening on CT with patient currently passing gas.  CT findings most concerning for malignancy but ddx also includes but is much less likely colitis of other etiology of benign stenosis. CT reviewed with Dr. Rico in radiology showing narrowing stricture most likely at the rectosigmoid junction.      Recommendations:   - OR for colonic stent placement   - serial abdominal X-rays    - supportive care per primary team   - case to be discussed with advanced team    Peggy Argueta, PGY-4  Gastroenterology Fellow  Pager x [phone] or [phone]  (After 5 pm or on weekends please page GI on call)

## 2019-01-27 NOTE — H&P ADULT - ATTENDING COMMENTS
Pt seen and examined.  Large bowel obstruction secondary to sigmoid stricture vs mass with competent ileocecal valve.  Attempted decompression with rigid sigmoidoscopy but unsuccessful.  GI eval for possible stent but likely will need Diverting loop colostomy for decompression.  D/w pt risks and benefits and pt agrees to diverting colostomy if needed.

## 2019-01-27 NOTE — H&P ADULT - NSHPPHYSICALEXAM_GEN_ALL_CORE
Gen: NAD  HEENT: no scleral injection, EOMI, no neck masses  Abdomen: soft, distended, lower abdominal tenderness, no guarding/rebound  Ext: warm well perfused

## 2019-01-27 NOTE — H&P ADULT - ASSESSMENT
74yo M with suspected rectal malignancy (? prostate recurrence ?) causing partial large bowel obstruction. Pt passing gas but no bowel movements.  - Check PSA  - NPO / IVF  - GI consult for stent  - Following colonic decompression, will need colonoscopy to workup rectal malignancy  - D/w Dr. Nichole VINCENT team  [phone]

## 2019-01-27 NOTE — CONSULT NOTE ADULT - ATTENDING COMMENTS
As above.  Pt seen/evaluated by me in preprocedure area.    Pt with large bowel obstruction.  Rigid proctoscopy by Dr. Varela this morning was performed, but anatomy unfavorable for decompression tube.  Asked to perform flexible sigmoidoscopy for colonic stent placement.  Risks, benefits, alternatives discussed with patient.  If colonic decompression is achieved with a stent, then elective surgery could be performed.  If unsuccessful, then urgent surgery would be performed.  Patient understands and agrees to proceed.

## 2019-01-27 NOTE — H&P ADULT - HISTORY OF PRESENT ILLNESS
72yo M with hx prostate ca s/p resection (2000), left kidney ca s/p L partial nephrectomy (2000), HLD, HTN, on metformin but reportedly not diabetic, now presents to the ED for the third day in a row with chief complaint of crampy abdominal pain, constipation x 5 days. He is still passing gas but has not had a bowel movement. His last colonoscopy was in 2010 by Dr. Bauer and he reportedly had a number of polyps that were resected at that time and were "benign." During his first visit to the ED on 1/24, he had a CT that showed colitis and some eccentric wall thickening in the rectum with perirectal adenopathy. He was scheduled to have a colonoscopy with his GI doctor this upcoming Monday (1/28). He re-presented yesterday and today with repeated symptoms.

## 2019-01-27 NOTE — BRIEF OPERATIVE NOTE - PROCEDURE
<<-----Click on this checkbox to enter Procedure Transverse colectomy  01/27/2019    Active  [id number]

## 2019-01-27 NOTE — CHART NOTE - NSCHARTNOTEFT_GEN_A_CORE
Unable to place reyes catheter at bedside. H/o prostrate CA s/p resection in 2000, no radiation. Urology consulted for reyes placement pre-operatively. Pt going to OR for loop colostomy today.     Fransisca Rodriguez, PGY-1  [facility] General Surgery- B Team  [phone]

## 2019-01-28 LAB
ANION GAP SERPL CALC-SCNC: 11 MMO/L — SIGNIFICANT CHANGE UP (ref 7–14)
BUN SERPL-MCNC: 17 MG/DL — SIGNIFICANT CHANGE UP (ref 7–23)
CALCIUM SERPL-MCNC: 7.3 MG/DL — LOW (ref 8.4–10.5)
CHLORIDE SERPL-SCNC: 101 MMOL/L — SIGNIFICANT CHANGE UP (ref 98–107)
CO2 SERPL-SCNC: 25 MMOL/L — SIGNIFICANT CHANGE UP (ref 22–31)
CREAT SERPL-MCNC: 1.1 MG/DL — SIGNIFICANT CHANGE UP (ref 0.5–1.3)
GLUCOSE BLDC GLUCOMTR-MCNC: 137 MG/DL — HIGH (ref 70–99)
GLUCOSE BLDC GLUCOMTR-MCNC: 137 MG/DL — HIGH (ref 70–99)
GLUCOSE BLDC GLUCOMTR-MCNC: 154 MG/DL — HIGH (ref 70–99)
GLUCOSE BLDC GLUCOMTR-MCNC: 170 MG/DL — HIGH (ref 70–99)
GLUCOSE SERPL-MCNC: 141 MG/DL — HIGH (ref 70–99)
HCT VFR BLD CALC: 37.3 % — LOW (ref 39–50)
HGB BLD-MCNC: 12 G/DL — LOW (ref 13–17)
MAGNESIUM SERPL-MCNC: 1.9 MG/DL — SIGNIFICANT CHANGE UP (ref 1.6–2.6)
MCHC RBC-ENTMCNC: 30.6 PG — SIGNIFICANT CHANGE UP (ref 27–34)
MCHC RBC-ENTMCNC: 32.2 % — SIGNIFICANT CHANGE UP (ref 32–36)
MCV RBC AUTO: 95.2 FL — SIGNIFICANT CHANGE UP (ref 80–100)
NRBC # FLD: 0 K/UL — LOW (ref 25–125)
PHOSPHATE SERPL-MCNC: 2.8 MG/DL — SIGNIFICANT CHANGE UP (ref 2.5–4.5)
PLATELET # BLD AUTO: 321 K/UL — SIGNIFICANT CHANGE UP (ref 150–400)
PMV BLD: 9.5 FL — SIGNIFICANT CHANGE UP (ref 7–13)
POTASSIUM SERPL-MCNC: 4.2 MMOL/L — SIGNIFICANT CHANGE UP (ref 3.5–5.3)
POTASSIUM SERPL-SCNC: 4.2 MMOL/L — SIGNIFICANT CHANGE UP (ref 3.5–5.3)
RBC # BLD: 3.92 M/UL — LOW (ref 4.2–5.8)
RBC # FLD: 12.3 % — SIGNIFICANT CHANGE UP (ref 10.3–14.5)
SODIUM SERPL-SCNC: 137 MMOL/L — SIGNIFICANT CHANGE UP (ref 135–145)
WBC # BLD: 8.34 K/UL — SIGNIFICANT CHANGE UP (ref 3.8–10.5)
WBC # FLD AUTO: 8.34 K/UL — SIGNIFICANT CHANGE UP (ref 3.8–10.5)

## 2019-01-28 RX ORDER — LISINOPRIL 2.5 MG/1
10 TABLET ORAL DAILY
Qty: 0 | Refills: 0 | Status: DISCONTINUED | OUTPATIENT
Start: 2019-01-28 | End: 2019-02-01

## 2019-01-28 RX ORDER — SODIUM CHLORIDE 9 MG/ML
1000 INJECTION, SOLUTION INTRAVENOUS
Qty: 0 | Refills: 0 | Status: DISCONTINUED | OUTPATIENT
Start: 2019-01-28 | End: 2019-01-29

## 2019-01-28 RX ORDER — METOPROLOL TARTRATE 50 MG
25 TABLET ORAL
Qty: 0 | Refills: 0 | Status: DISCONTINUED | OUTPATIENT
Start: 2019-01-28 | End: 2019-01-28

## 2019-01-28 RX ORDER — ATORVASTATIN CALCIUM 80 MG/1
40 TABLET, FILM COATED ORAL AT BEDTIME
Qty: 0 | Refills: 0 | Status: DISCONTINUED | OUTPATIENT
Start: 2019-01-28 | End: 2019-02-01

## 2019-01-28 RX ORDER — INSULIN LISPRO 100/ML
VIAL (ML) SUBCUTANEOUS
Qty: 0 | Refills: 0 | Status: DISCONTINUED | OUTPATIENT
Start: 2019-01-28 | End: 2019-02-01

## 2019-01-28 RX ORDER — ASPIRIN/CALCIUM CARB/MAGNESIUM 324 MG
81 TABLET ORAL DAILY
Qty: 0 | Refills: 0 | Status: DISCONTINUED | OUTPATIENT
Start: 2019-01-28 | End: 2019-02-01

## 2019-01-28 RX ADMIN — Medication 2.5 MILLIGRAM(S): at 12:48

## 2019-01-28 RX ADMIN — HYDROMORPHONE HYDROCHLORIDE 30 MILLILITER(S): 2 INJECTION INTRAMUSCULAR; INTRAVENOUS; SUBCUTANEOUS at 20:22

## 2019-01-28 RX ADMIN — Medication 400 MILLIGRAM(S): at 09:30

## 2019-01-28 RX ADMIN — Medication 100 GRAM(S): at 10:52

## 2019-01-28 RX ADMIN — ATORVASTATIN CALCIUM 40 MILLIGRAM(S): 80 TABLET, FILM COATED ORAL at 22:12

## 2019-01-28 RX ADMIN — Medication 1000 MILLIGRAM(S): at 17:15

## 2019-01-28 RX ADMIN — HYDROMORPHONE HYDROCHLORIDE 30 MILLILITER(S): 2 INJECTION INTRAMUSCULAR; INTRAVENOUS; SUBCUTANEOUS at 08:29

## 2019-01-28 RX ADMIN — Medication 400 MILLIGRAM(S): at 16:54

## 2019-01-28 RX ADMIN — SODIUM CHLORIDE 75 MILLILITER(S): 9 INJECTION, SOLUTION INTRAVENOUS at 16:54

## 2019-01-28 RX ADMIN — Medication 1000 MILLIGRAM(S): at 10:00

## 2019-01-28 RX ADMIN — Medication 1000 MILLIGRAM(S): at 00:59

## 2019-01-28 RX ADMIN — Medication 2.5 MILLIGRAM(S): at 06:55

## 2019-01-28 RX ADMIN — SODIUM CHLORIDE 125 MILLILITER(S): 9 INJECTION, SOLUTION INTRAVENOUS at 08:22

## 2019-01-28 RX ADMIN — Medication 1: at 18:18

## 2019-01-28 RX ADMIN — Medication 63.75 MILLIMOLE(S): at 11:57

## 2019-01-28 RX ADMIN — Medication 1: at 12:47

## 2019-01-28 RX ADMIN — ENOXAPARIN SODIUM 40 MILLIGRAM(S): 100 INJECTION SUBCUTANEOUS at 09:30

## 2019-01-28 RX ADMIN — Medication 2.5 MILLIGRAM(S): at 02:48

## 2019-01-28 RX ADMIN — SODIUM CHLORIDE 125 MILLILITER(S): 9 INJECTION, SOLUTION INTRAVENOUS at 09:30

## 2019-01-28 RX ADMIN — Medication 400 MILLIGRAM(S): at 00:29

## 2019-01-28 NOTE — PROGRESS NOTE ADULT - SUBJECTIVE AND OBJECTIVE BOX
ANESTHESIA POSTOP CHECK    73y Male POSTOP DAY 1    Vital Signs Last 24 Hrs  T(C): 37 (28 Jan 2019 06:50), Max: 37.3 (27 Jan 2019 09:58)  T(F): 98.6 (28 Jan 2019 06:50), Max: 99.1 (27 Jan 2019 09:58)  HR: 88 (28 Jan 2019 06:50) (72 - 97)  BP: 138/78 (28 Jan 2019 06:50) (138/78 - 183/79)  BP(mean): 102 (27 Jan 2019 19:30) (91 - 105)  RR: 16 (28 Jan 2019 06:50) (12 - 17)  SpO2: 95% (28 Jan 2019 06:50) (95% - 100%)  I&O's Summary    27 Jan 2019 07:01  -  28 Jan 2019 07:00  --------------------------------------------------------  IN: 2400 mL / OUT: 905 mL / NET: 1495 mL        [X ] NO APPARENT ANESTHESIA COMPLICATIONS      Comments:       Reji Santana MBBS  Anesthesiology Resident  Pager [phone]

## 2019-01-28 NOTE — PROCEDURE NOTE - NSURITECHNIQUE_GU_A_CORE
The catheter was appropriately lubricated/The collection bag is below the level of the patient and urinary bladder/Sterile gloves were worn for the duration of the procedure/Proper hand hygiene was performed/A sterile drape was used to cover all adjacent areas/The site was cleaned with soap/water and sterile solution (betadine)/The catheter was secured with a securement device (e.g. StatLock)/The urinary drainage system is closed at the end of the procedure/All applicable medical record documentation is completed
The catheter was appropriately lubricated/The collection bag is below the level of the patient and urinary bladder/Proper hand hygiene was performed/Sterile gloves were worn for the duration of the procedure/The catheter was secured with a securement device (e.g. StatLock)/All applicable medical record documentation is completed/The site was cleaned with soap/water and sterile solution (betadine)/The urinary drainage system is closed at the end of the procedure/A sterile drape was used to cover all adjacent areas

## 2019-01-28 NOTE — PROGRESS NOTE ADULT - SUBJECTIVE AND OBJECTIVE BOX
B-Team Surgery Progress Note     Subjective/24hour Events: No acute events overnight. Pain controlled. Currently NPO No N/V. No CP or SOB.    Vital Signs:  Vital Signs Last 24 Hrs  T(C): 37.2 (28 Jan 2019 14:00), Max: 37.2 (28 Jan 2019 02:00)  T(F): 98.9 (28 Jan 2019 14:00), Max: 98.9 (28 Jan 2019 02:00)  HR: 82 (28 Jan 2019 14:00) (75 - 97)  BP: 109/66 (28 Jan 2019 14:00) (109/66 - 183/79)  BP(mean): 102 (27 Jan 2019 19:30) (91 - 105)  RR: 16 (28 Jan 2019 14:00) (12 - 17)  SpO2: 99% (28 Jan 2019 14:00) (95% - 100%)    CAPILLARY BLOOD GLUCOSE      POCT Blood Glucose.: 170 mg/dL (28 Jan 2019 12:37)  POCT Blood Glucose.: 137 mg/dL (28 Jan 2019 06:03)  POCT Blood Glucose.: 155 mg/dL (27 Jan 2019 23:34)  POCT Blood Glucose.: 145 mg/dL (27 Jan 2019 17:42)      I&O's Detail    27 Jan 2019 07:01  -  28 Jan 2019 07:00  --------------------------------------------------------  IN:    IV PiggyBack: 150 mL    lactated ringers.: 1750 mL    lactated ringers.: 500 mL  Total IN: 2400 mL    OUT:    Ileostomy: 30 mL    Indwelling Catheter - Urethral: 675 mL    Voided: 200 mL  Total OUT: 905 mL    Total NET: 1495 mL      28 Jan 2019 07:01  -  28 Jan 2019 15:25  --------------------------------------------------------  IN:    dextrose 5% + sodium chloride 0.45%.: 1000 mL    IV PiggyBack: 100 mL  Total IN: 1100 mL    OUT:    Ileostomy: 125 mL    Indwelling Catheter - Urethral: 100 mL  Total OUT: 225 mL    Total NET: 875 mL            MEDICATIONS  (STANDING):  acetaminophen  IVPB .. 1000 milliGRAM(s) IV Intermittent once  acetaminophen  IVPB .. 1000 milliGRAM(s) IV Intermittent once  dextrose 5% + sodium chloride 0.45%. 1000 milliLiter(s) (75 mL/Hr) IV Continuous <Continuous>  dextrose 5%. 1000 milliLiter(s) (50 mL/Hr) IV Continuous <Continuous>  dextrose 50% Injectable 12.5 Gram(s) IV Push once  dextrose 50% Injectable 25 Gram(s) IV Push once  dextrose 50% Injectable 25 Gram(s) IV Push once  enoxaparin Injectable 40 milliGRAM(s) SubCutaneous every 24 hours  HYDROmorphone PCA (1 mG/mL) 30 milliLiter(s) PCA Continuous PCA Continuous  insulin lispro (HumaLOG) corrective regimen sliding scale   SubCutaneous every 6 hours  metoprolol tartrate 25 milliGRAM(s) Oral two times a day    MEDICATIONS  (PRN):  dextrose 40% Gel 15 Gram(s) Oral once PRN Blood Glucose LESS THAN 70 milliGRAM(s)/deciliter  diphenhydrAMINE   Injectable 25 milliGRAM(s) IV Push every 4 hours PRN Pruritus  glucagon  Injectable 1 milliGRAM(s) IntraMuscular once PRN Glucose LESS THAN 70 milligrams/deciliter  HYDROmorphone PCA (1 mG/mL) Rescue Clinician Bolus 0.5 milliGRAM(s) IV Push every 15 minutes PRN for Pain Scale GREATER THAN 6  naloxone Injectable 0.1 milliGRAM(s) IV Push every 3 minutes PRN For ANY of the following changes in patient status:  A. RR LESS THAN 10 breaths per minute, B. Oxygen saturation LESS THAN 90%, C. Sedation score of 6  ondansetron Injectable 4 milliGRAM(s) IV Push every 6 hours PRN Nausea  ondansetron Injectable 4 milliGRAM(s) IV Push once PRN Nausea and/or Vomiting        Physical Exam:  Gen: NAD  LS: nml respiratory effort  Card: pulse regularly present  GI: abd soft, appropraitely tender, midline incision, left mucous fistula, right colostomy  Ext: warm      Labs:    01-28    137  |  101  |  17  ----------------------------<  141<H>  4.2   |  25  |  1.10    Ca    7.3<L>      28 Jan 2019 06:05  Phos  2.8     01-28  Mg     1.9     01-28                              12.0   8.34  )-----------( 321      ( 28 Jan 2019 06:05 )             37.3     PT/INR - ( 27 Jan 2019 09:30 )   PT: 11.8 SEC;   INR: 1.06          PTT - ( 27 Jan 2019 09:30 )  PTT:29.3 SEC

## 2019-01-28 NOTE — PROGRESS NOTE ADULT - SUBJECTIVE AND OBJECTIVE BOX
Anesthesia Pain Management Service    SUBJECTIVE: Patient is doing well with IV PCA and no significant problems reported.    Pain Scale Score	At rest: _2__ 	With Activity: ___ 	[X ] Refer to charted pain scores    THERAPY:    [ ] IV PCA Morphine		[ ] 5 mg/mL	[ ] 1 mg/mL  [X ] IV PCA Hydromorphone	[ ] 5 mg/mL	[X ] 1 mg/mL  [ ] IV PCA Fentanyl		[ ] 50 micrograms/mL    Demand dose __0.2_ lockout __6_ (minutes) Continuous Rate _0__ Total: __1.1_  mg used (in past 24 hours)      MEDICATIONS  (STANDING):  acetaminophen  IVPB .. 1000 milliGRAM(s) IV Intermittent once  acetaminophen  IVPB .. 1000 milliGRAM(s) IV Intermittent once  cefoTEtan  IVPB 1 Gram(s) IV Intermittent every 12 hours  dextrose 5% + sodium chloride 0.45%. 1000 milliLiter(s) (125 mL/Hr) IV Continuous <Continuous>  dextrose 5%. 1000 milliLiter(s) (50 mL/Hr) IV Continuous <Continuous>  dextrose 50% Injectable 12.5 Gram(s) IV Push once  dextrose 50% Injectable 25 Gram(s) IV Push once  dextrose 50% Injectable 25 Gram(s) IV Push once  enoxaparin Injectable 40 milliGRAM(s) SubCutaneous every 24 hours  HYDROmorphone PCA (1 mG/mL) 30 milliLiter(s) PCA Continuous PCA Continuous  insulin lispro (HumaLOG) corrective regimen sliding scale   SubCutaneous every 6 hours  metoprolol tartrate Injectable 2.5 milliGRAM(s) IV Push every 6 hours  sodium phosphate IVPB 15 milliMole(s) IV Intermittent once    MEDICATIONS  (PRN):  dextrose 40% Gel 15 Gram(s) Oral once PRN Blood Glucose LESS THAN 70 milliGRAM(s)/deciliter  diphenhydrAMINE   Injectable 25 milliGRAM(s) IV Push every 4 hours PRN Pruritus  glucagon  Injectable 1 milliGRAM(s) IntraMuscular once PRN Glucose LESS THAN 70 milligrams/deciliter  HYDROmorphone PCA (1 mG/mL) Rescue Clinician Bolus 0.5 milliGRAM(s) IV Push every 15 minutes PRN for Pain Scale GREATER THAN 6  naloxone Injectable 0.1 milliGRAM(s) IV Push every 3 minutes PRN For ANY of the following changes in patient status:  A. RR LESS THAN 10 breaths per minute, B. Oxygen saturation LESS THAN 90%, C. Sedation score of 6  ondansetron Injectable 4 milliGRAM(s) IV Push every 6 hours PRN Nausea  ondansetron Injectable 4 milliGRAM(s) IV Push once PRN Nausea and/or Vomiting      OBJECTIVE:    Sedation Score:	[ X] Alert	[ ] Drowsy 	[ ] Arousable	[ ] Asleep	[ ] Unresponsive    Side Effects:	[X ] None	[ ] Nausea	[ ] Vomiting	[ ] Pruritus  		[ ] Other:    Vital Signs Last 24 Hrs  T(C): 37 (28 Jan 2019 06:50), Max: 37.3 (27 Jan 2019 09:58)  T(F): 98.6 (28 Jan 2019 06:50), Max: 99.1 (27 Jan 2019 09:58)  HR: 88 (28 Jan 2019 06:50) (72 - 97)  BP: 138/78 (28 Jan 2019 06:50) (138/78 - 183/79)  BP(mean): 102 (27 Jan 2019 19:30) (91 - 105)  RR: 16 (28 Jan 2019 06:50) (12 - 17)  SpO2: 95% (28 Jan 2019 06:50) (95% - 100%)    ASSESSMENT/ PLAN    Therapy to  be:	[ X] Continue   [ ] Discontinued   [ ] Change to prn Analgesics    Documentation and Verification of current medications:   [X] Done	[ ] Not done, not elligible    Comments:  continue IV PCA and IV tylenol

## 2019-01-28 NOTE — PROGRESS NOTE ADULT - ASSESSMENT
73 M with PMHx prostrate CA s/p resection (2000), L RCC s/p L partial nephrectomy (2000), HLD, HTN p/w abd pain/constipation x5days, flatus/no BM. CT concerning for partial LBO at distal descending colon 2/2 underlying malignancy. Now s/p transverse colectomy for LBO and serosal tears on 1/27.     Plan  - Pain control: IV Tylenol, dilaudid PCA  - Consistent carb clear liquid diet  - D/c reyes  - DVT ppx: Lovenox

## 2019-01-28 NOTE — PROCEDURE NOTE - NSSITEPREP_SKIN_A_CORE
povidone iodine (if allergic to chlorhexidine)
Adherence to aseptic technique: hand hygiene prior to donning barriers (gown, gloves), don cap and mask, sterile drape over patient/chlorhexidine

## 2019-01-28 NOTE — PROCEDURE NOTE - PROCEDURE
<<-----Click on this checkbox to enter Procedure Shepard catheter to gravity drainage  01/28/2019    Active  CHELSEA

## 2019-01-28 NOTE — PROGRESS NOTE ADULT - ATTENDING COMMENTS
Seen and examiined, chart and note reviewed, case discussed with B team    Obstructing colonic mass s/p transverse colectomy  a,  Complains of lightheadedness, and incisional pain  b.  NPO, continue IVF resuscitation  c.  Monitor I and O's, YOLANDA reyes at 12am  d.  Continue DVT prophylaxis  e.  Discuss with family

## 2019-01-29 LAB
ANION GAP SERPL CALC-SCNC: 7 MMO/L — SIGNIFICANT CHANGE UP (ref 7–14)
ANION GAP SERPL CALC-SCNC: 7 MMO/L — SIGNIFICANT CHANGE UP (ref 7–14)
BUN SERPL-MCNC: 12 MG/DL — SIGNIFICANT CHANGE UP (ref 7–23)
BUN SERPL-MCNC: 12 MG/DL — SIGNIFICANT CHANGE UP (ref 7–23)
CALCIUM SERPL-MCNC: 7.9 MG/DL — LOW (ref 8.4–10.5)
CALCIUM SERPL-MCNC: 7.9 MG/DL — LOW (ref 8.4–10.5)
CHLORIDE SERPL-SCNC: 99 MMOL/L — SIGNIFICANT CHANGE UP (ref 98–107)
CHLORIDE SERPL-SCNC: 99 MMOL/L — SIGNIFICANT CHANGE UP (ref 98–107)
CO2 SERPL-SCNC: 27 MMOL/L — SIGNIFICANT CHANGE UP (ref 22–31)
CO2 SERPL-SCNC: 27 MMOL/L — SIGNIFICANT CHANGE UP (ref 22–31)
CREAT SERPL-MCNC: 1.1 MG/DL — SIGNIFICANT CHANGE UP (ref 0.5–1.3)
CREAT SERPL-MCNC: 1.1 MG/DL — SIGNIFICANT CHANGE UP (ref 0.5–1.3)
GLUCOSE BLDC GLUCOMTR-MCNC: 120 MG/DL — HIGH (ref 70–99)
GLUCOSE BLDC GLUCOMTR-MCNC: 131 MG/DL — HIGH (ref 70–99)
GLUCOSE BLDC GLUCOMTR-MCNC: 149 MG/DL — HIGH (ref 70–99)
GLUCOSE BLDC GLUCOMTR-MCNC: 96 MG/DL — SIGNIFICANT CHANGE UP (ref 70–99)
GLUCOSE SERPL-MCNC: 132 MG/DL — HIGH (ref 70–99)
GLUCOSE SERPL-MCNC: 132 MG/DL — HIGH (ref 70–99)
HCT VFR BLD CALC: 32.2 % — LOW (ref 39–50)
HCT VFR BLD CALC: 34.1 % — LOW (ref 39–50)
HGB BLD-MCNC: 10.3 G/DL — LOW (ref 13–17)
HGB BLD-MCNC: 10.9 G/DL — LOW (ref 13–17)
MAGNESIUM SERPL-MCNC: 2 MG/DL — SIGNIFICANT CHANGE UP (ref 1.6–2.6)
MCHC RBC-ENTMCNC: 30.8 PG — SIGNIFICANT CHANGE UP (ref 27–34)
MCHC RBC-ENTMCNC: 30.9 PG — SIGNIFICANT CHANGE UP (ref 27–34)
MCHC RBC-ENTMCNC: 32 % — SIGNIFICANT CHANGE UP (ref 32–36)
MCHC RBC-ENTMCNC: 32 % — SIGNIFICANT CHANGE UP (ref 32–36)
MCV RBC AUTO: 96.3 FL — SIGNIFICANT CHANGE UP (ref 80–100)
MCV RBC AUTO: 96.7 FL — SIGNIFICANT CHANGE UP (ref 80–100)
NRBC # FLD: 0 K/UL — LOW (ref 25–125)
NRBC # FLD: 0 K/UL — LOW (ref 25–125)
PHOSPHATE SERPL-MCNC: 2 MG/DL — LOW (ref 2.5–4.5)
PLATELET # BLD AUTO: 288 K/UL — SIGNIFICANT CHANGE UP (ref 150–400)
PLATELET # BLD AUTO: 310 K/UL — SIGNIFICANT CHANGE UP (ref 150–400)
PMV BLD: 9.4 FL — SIGNIFICANT CHANGE UP (ref 7–13)
PMV BLD: 9.7 FL — SIGNIFICANT CHANGE UP (ref 7–13)
POTASSIUM SERPL-MCNC: 4.2 MMOL/L — SIGNIFICANT CHANGE UP (ref 3.5–5.3)
POTASSIUM SERPL-MCNC: 4.2 MMOL/L — SIGNIFICANT CHANGE UP (ref 3.5–5.3)
POTASSIUM SERPL-SCNC: 4.2 MMOL/L — SIGNIFICANT CHANGE UP (ref 3.5–5.3)
POTASSIUM SERPL-SCNC: 4.2 MMOL/L — SIGNIFICANT CHANGE UP (ref 3.5–5.3)
PSA FREE FLD-MCNC: SIGNIFICANT CHANGE UP
PSA FREE FLD-MCNC: SIGNIFICANT CHANGE UP
PSA FREE MFR FLD: 0.03 — SIGNIFICANT CHANGE UP
RBC # BLD: 3.33 M/UL — LOW (ref 4.2–5.8)
RBC # BLD: 3.54 M/UL — LOW (ref 4.2–5.8)
RBC # FLD: 12.1 % — SIGNIFICANT CHANGE UP (ref 10.3–14.5)
RBC # FLD: 12.3 % — SIGNIFICANT CHANGE UP (ref 10.3–14.5)
SODIUM SERPL-SCNC: 133 MMOL/L — LOW (ref 135–145)
SODIUM SERPL-SCNC: 133 MMOL/L — LOW (ref 135–145)
WBC # BLD: 8 K/UL — SIGNIFICANT CHANGE UP (ref 3.8–10.5)
WBC # BLD: 9.41 K/UL — SIGNIFICANT CHANGE UP (ref 3.8–10.5)
WBC # FLD AUTO: 8 K/UL — SIGNIFICANT CHANGE UP (ref 3.8–10.5)
WBC # FLD AUTO: 9.41 K/UL — SIGNIFICANT CHANGE UP (ref 3.8–10.5)

## 2019-01-29 RX ORDER — SODIUM CHLORIDE 9 MG/ML
1000 INJECTION, SOLUTION INTRAVENOUS
Qty: 0 | Refills: 0 | Status: DISCONTINUED | OUTPATIENT
Start: 2019-01-29 | End: 2019-01-29

## 2019-01-29 RX ORDER — TAMSULOSIN HYDROCHLORIDE 0.4 MG/1
0.4 CAPSULE ORAL AT BEDTIME
Qty: 0 | Refills: 0 | Status: DISCONTINUED | OUTPATIENT
Start: 2019-01-29 | End: 2019-02-01

## 2019-01-29 RX ORDER — ACETAMINOPHEN 500 MG
650 TABLET ORAL EVERY 6 HOURS
Qty: 0 | Refills: 0 | Status: COMPLETED | OUTPATIENT
Start: 2019-01-29 | End: 2019-01-31

## 2019-01-29 RX ORDER — OXYCODONE HYDROCHLORIDE 5 MG/1
7.5 TABLET ORAL
Qty: 0 | Refills: 0 | Status: DISCONTINUED | OUTPATIENT
Start: 2019-01-29 | End: 2019-02-01

## 2019-01-29 RX ORDER — OXYCODONE HYDROCHLORIDE 5 MG/1
5 TABLET ORAL
Qty: 0 | Refills: 0 | Status: DISCONTINUED | OUTPATIENT
Start: 2019-01-29 | End: 2019-02-01

## 2019-01-29 RX ADMIN — Medication 650 MILLIGRAM(S): at 12:15

## 2019-01-29 RX ADMIN — TAMSULOSIN HYDROCHLORIDE 0.4 MILLIGRAM(S): 0.4 CAPSULE ORAL at 21:26

## 2019-01-29 RX ADMIN — HYDROMORPHONE HYDROCHLORIDE 30 MILLILITER(S): 2 INJECTION INTRAMUSCULAR; INTRAVENOUS; SUBCUTANEOUS at 08:17

## 2019-01-29 RX ADMIN — Medication 81 MILLIGRAM(S): at 11:27

## 2019-01-29 RX ADMIN — Medication 650 MILLIGRAM(S): at 19:00

## 2019-01-29 RX ADMIN — ATORVASTATIN CALCIUM 40 MILLIGRAM(S): 80 TABLET, FILM COATED ORAL at 21:26

## 2019-01-29 RX ADMIN — LISINOPRIL 10 MILLIGRAM(S): 2.5 TABLET ORAL at 07:02

## 2019-01-29 RX ADMIN — Medication 650 MILLIGRAM(S): at 11:27

## 2019-01-29 RX ADMIN — Medication 63.75 MILLIMOLE(S): at 11:28

## 2019-01-29 RX ADMIN — ENOXAPARIN SODIUM 40 MILLIGRAM(S): 100 INJECTION SUBCUTANEOUS at 10:23

## 2019-01-29 RX ADMIN — Medication 650 MILLIGRAM(S): at 18:06

## 2019-01-29 NOTE — PROGRESS NOTE ADULT - SUBJECTIVE AND OBJECTIVE BOX
General Surgery Progress Note    SUBJECTIVE:  The patient was seen and examined. No acute events overnight. Failed TOV, unable to straight cath, urology called and placed a reyes on o/n. Colostomy with liquid stool output. Denies N/V.     OBJECTIVE:     ** VITAL SIGNS / I&O's **    Vital Signs Last 24 Hrs  T(C): 37.1 (29 Jan 2019 10:58), Max: 37.6 (28 Jan 2019 18:52)  T(F): 98.8 (29 Jan 2019 10:58), Max: 99.7 (28 Jan 2019 18:52)  HR: 95 (29 Jan 2019 10:58) (82 - 95)  BP: 150/90 (29 Jan 2019 10:58) (109/66 - 150/90)  BP(mean): --  RR: 18 (29 Jan 2019 10:58) (16 - 18)  SpO2: 97% (29 Jan 2019 10:58) (95% - 99%)      28 Jan 2019 07:01  -  29 Jan 2019 07:00  --------------------------------------------------------  IN:    dextrose 5% + sodium chloride 0.45%.: 2125 mL    IV PiggyBack: 100 mL    Oral Fluid: 130 mL  Total IN: 2355 mL    OUT:    Drain: 30 mL    Ileostomy: 625 mL    Indwelling Catheter - Urethral: 1000 mL  Total OUT: 1655 mL    Total NET: 700 mL      29 Jan 2019 07:01  -  29 Jan 2019 13:13  --------------------------------------------------------  IN:    dextrose 5% + sodium chloride 0.45%.: 300 mL    Oral Fluid: 240 mL  Total IN: 540 mL    OUT:    Ileostomy: 100 mL    Indwelling Catheter - Urethral: 240 mL  Total OUT: 340 mL    Total NET: 200 mL          ** PHYSICAL EXAM **    -- CONSTITUTIONAL: Alert, NAD  -- PULMONARY: non-labored respirations  -- ABDOMEN: soft, non-distended, non-tender, midline incision dressing c/d/i, R colostomy with liquid stool output, L mucous fistula with no output  -- : reyes in place   -- NEURO: A&Ox3    ** LABS **                          10.3   8.00  )-----------( 288      ( 29 Jan 2019 05:55 )             32.2     29 Jan 2019 05:55    133    |  99     |  12     ----------------------------<  132    4.2     |  27     |  1.10     Ca    7.9        29 Jan 2019 05:55  Phos  2.0       29 Jan 2019 05:55  Mg     2.0       29 Jan 2019 05:55        CAPILLARY BLOOD GLUCOSE      POCT Blood Glucose.: 120 mg/dL (29 Jan 2019 12:09)  POCT Blood Glucose.: 131 mg/dL (29 Jan 2019 07:46)  POCT Blood Glucose.: 137 mg/dL (28 Jan 2019 22:08)  POCT Blood Glucose.: 154 mg/dL (28 Jan 2019 17:51)                MEDICATIONS  (STANDING):  acetaminophen   Tablet .. 650 milliGRAM(s) Oral every 6 hours  aspirin enteric coated 81 milliGRAM(s) Oral daily  atorvastatin 40 milliGRAM(s) Oral at bedtime  dextrose 5%. 1000 milliLiter(s) (50 mL/Hr) IV Continuous <Continuous>  dextrose 50% Injectable 12.5 Gram(s) IV Push once  dextrose 50% Injectable 25 Gram(s) IV Push once  dextrose 50% Injectable 25 Gram(s) IV Push once  enoxaparin Injectable 40 milliGRAM(s) SubCutaneous every 24 hours  insulin lispro (HumaLOG) corrective regimen sliding scale   SubCutaneous Before meals and at bedtime  lisinopril 10 milliGRAM(s) Oral daily  tamsulosin 0.4 milliGRAM(s) Oral at bedtime    MEDICATIONS  (PRN):  dextrose 40% Gel 15 Gram(s) Oral once PRN Blood Glucose LESS THAN 70 milliGRAM(s)/deciliter  glucagon  Injectable 1 milliGRAM(s) IntraMuscular once PRN Glucose LESS THAN 70 milligrams/deciliter  ondansetron Injectable 4 milliGRAM(s) IV Push once PRN Nausea and/or Vomiting  oxyCODONE    IR 5 milliGRAM(s) Oral every 3 hours PRN Moderate Pain (4 - 6)  oxyCODONE    IR 7.5 milliGRAM(s) Oral every 3 hours PRN Severe Pain (7 - 10)

## 2019-01-29 NOTE — PROGRESS NOTE ADULT - SUBJECTIVE AND OBJECTIVE BOX
Anesthesia Pain Management Service    SUBJECTIVE: Patient is doing well with IV PCA and no significant problems reported.    Pain Scale Score	At rest: _4__ 	With Activity: ___ 	[X ] Refer to charted pain scores    THERAPY:    [ ] IV PCA Morphine		[ ] 5 mg/mL	[ ] 1 mg/mL  [X ] IV PCA Hydromorphone	[ ] 5 mg/mL	[X ] 1 mg/mL  [ ] IV PCA Fentanyl		[ ] 50 micrograms/mL    Demand dose __0.2_ lockout __6_ (minutes) Continuous Rate _0__ Total: _1.4__  mg used (in past 24 hours)      MEDICATIONS  (STANDING):  acetaminophen   Tablet .. 650 milliGRAM(s) Oral every 6 hours  aspirin enteric coated 81 milliGRAM(s) Oral daily  atorvastatin 40 milliGRAM(s) Oral at bedtime  dextrose 5%. 1000 milliLiter(s) (50 mL/Hr) IV Continuous <Continuous>  dextrose 50% Injectable 12.5 Gram(s) IV Push once  dextrose 50% Injectable 25 Gram(s) IV Push once  dextrose 50% Injectable 25 Gram(s) IV Push once  enoxaparin Injectable 40 milliGRAM(s) SubCutaneous every 24 hours  insulin lispro (HumaLOG) corrective regimen sliding scale   SubCutaneous Before meals and at bedtime  lisinopril 10 milliGRAM(s) Oral daily  sodium phosphate IVPB 15 milliMole(s) IV Intermittent once  tamsulosin 0.4 milliGRAM(s) Oral at bedtime    MEDICATIONS  (PRN):  dextrose 40% Gel 15 Gram(s) Oral once PRN Blood Glucose LESS THAN 70 milliGRAM(s)/deciliter  glucagon  Injectable 1 milliGRAM(s) IntraMuscular once PRN Glucose LESS THAN 70 milligrams/deciliter  ondansetron Injectable 4 milliGRAM(s) IV Push once PRN Nausea and/or Vomiting  oxyCODONE    IR 5 milliGRAM(s) Oral every 3 hours PRN Moderate Pain (4 - 6)  oxyCODONE    IR 7.5 milliGRAM(s) Oral every 3 hours PRN Severe Pain (7 - 10)      OBJECTIVE:    Sedation Score:	[ X] Alert	[ ] Drowsy 	[ ] Arousable	[ ] Asleep	[ ] Unresponsive    Side Effects:	[X ] None	[ ] Nausea	[ ] Vomiting	[ ] Pruritus  		[ ] Other:    Vital Signs Last 24 Hrs  T(C): 37.5 (29 Jan 2019 06:18), Max: 37.6 (28 Jan 2019 18:52)  T(F): 99.5 (29 Jan 2019 06:18), Max: 99.7 (28 Jan 2019 18:52)  HR: 95 (29 Jan 2019 06:18) (82 - 95)  BP: 144/78 (29 Jan 2019 06:18) (109/66 - 144/78)  BP(mean): --  RR: 18 (29 Jan 2019 06:18) (16 - 18)  SpO2: 95% (29 Jan 2019 06:18) (95% - 99%)    ASSESSMENT/ PLAN    Therapy to  be:	[ ] Continue   [ x ] Discontinued   [x ] Change to prn Analgesics    Documentation and Verification of current medications:   [X] Done	[ ] Not done, not elligible    Comments:  dc iv pca  tylenol rtc, oxy5/7.5 prn

## 2019-01-29 NOTE — PHYSICAL THERAPY INITIAL EVALUATION ADULT - GENERAL OBSERVATIONS, REHAB EVAL
Pt encountered in semisupine position, no distress, AxOX4, with +IV Pt encountered in semisupine position, no distress, AxOX4, with +IV, and family @bedside

## 2019-01-29 NOTE — PHYSICAL THERAPY INITIAL EVALUATION ADULT - PATIENT PROFILE REVIEW, REHAB EVAL
yes/No Formal Activity Order in the computer; spoke with RN Sohail prior to PT evaluation--> Pt OK for PT consult/OOB activity

## 2019-01-29 NOTE — PROGRESS NOTE ADULT - ASSESSMENT
73 M with PMHx prostrate CA s/p resection (2000), L RCC s/p L partial nephrectomy (2000), HLD, HTN p/w abd pain/constipation x5days, flatus/no BM. CT concerning for partial LBO at distal descending colon 2/2 underlying malignancy. Now s/p transverse colectomy for LBO and serosal tears on 1/27.     Plan  - Pain control: Dilaudid PCA will be dc'd today and transition to PO/IV pain meds  - DIET: adv to LRD  - Monitor reyes output, continue BPH meds  - DVT ppx: Lovenox  - PT recs: home, no skilled PT needs    Fransisca Rodriguez, PGY-1  [facility] General Surgery- B Team  [id number]

## 2019-01-29 NOTE — PHYSICAL THERAPY INITIAL EVALUATION ADULT - PERTINENT HX OF CURRENT PROBLEM, REHAB EVAL
74yo M with hx prostate ca s/p resection (2000), left kidney ca s/p L partial nephrectomy (2000), HLD, HTN, on metformin but reportedly not diabetic, now presents to the ED for the third day in a row with chief complaint of crampy abdominal pain, constipation x 5 days 72yo M with hx prostate ca s/p resection (2000), left kidney ca s/p Left partial nephrectomy (2000), HLD, HTN, on metformin but reportedly not diabetic, now presents to the ED for the third day in a row with chief complaint of crampy abdominal pain, constipation x 5 days

## 2019-01-29 NOTE — PROGRESS NOTE ADULT - SUBJECTIVE AND OBJECTIVE BOX
Anesthesia Pain Management Service    SUBJECTIVE: Pt doing well with IV PCA without problems reported.    Therapy:	  [ X] IV PCA	   [ ] Epidural           [ ] s/p Spinal Opoid              [ ] Postpartum infusion	  [ ] Patient controlled regional anesthesia (PCRA)    [ ] prn Analgesics    Allergies    No Known Allergies    Intolerances      MEDICATIONS  (STANDING):  acetaminophen   Tablet .. 650 milliGRAM(s) Oral every 6 hours  aspirin enteric coated 81 milliGRAM(s) Oral daily  atorvastatin 40 milliGRAM(s) Oral at bedtime  dextrose 5%. 1000 milliLiter(s) (50 mL/Hr) IV Continuous <Continuous>  dextrose 50% Injectable 12.5 Gram(s) IV Push once  dextrose 50% Injectable 25 Gram(s) IV Push once  dextrose 50% Injectable 25 Gram(s) IV Push once  enoxaparin Injectable 40 milliGRAM(s) SubCutaneous every 24 hours  insulin lispro (HumaLOG) corrective regimen sliding scale   SubCutaneous Before meals and at bedtime  lisinopril 10 milliGRAM(s) Oral daily  tamsulosin 0.4 milliGRAM(s) Oral at bedtime    MEDICATIONS  (PRN):  dextrose 40% Gel 15 Gram(s) Oral once PRN Blood Glucose LESS THAN 70 milliGRAM(s)/deciliter  glucagon  Injectable 1 milliGRAM(s) IntraMuscular once PRN Glucose LESS THAN 70 milligrams/deciliter  ondansetron Injectable 4 milliGRAM(s) IV Push once PRN Nausea and/or Vomiting  oxyCODONE    IR 5 milliGRAM(s) Oral every 3 hours PRN Moderate Pain (4 - 6)  oxyCODONE    IR 7.5 milliGRAM(s) Oral every 3 hours PRN Severe Pain (7 - 10)      OBJECTIVE:   [X] No new signs     [ ] Other:    Side Effects:  [X ] None			[ ] Other:    Assessment of Catheter Site:		[ ] Intact		[ ] Other:    ASSESSMENT/PLAN  [ X] Continue current therapy    [ ] Therapy changed to:    [ ] IV PCA       [ ] Epidural     [ ] prn Analgesics     Comments:

## 2019-01-29 NOTE — PROGRESS NOTE ADULT - ATTENDING COMMENTS
s/p transverse colectomy with end colostomy and mucus fistula for obstructing pelvic mass  a.  Clears  b.  IVF  c.  DVT prophylaxis    Urinary retention/BPH  a.  Require urinary cath placement overnight  b.  Continue BPH

## 2019-01-30 ENCOUNTER — TRANSCRIPTION ENCOUNTER (OUTPATIENT)
Age: 74
End: 2019-01-30

## 2019-01-30 LAB
ANION GAP SERPL CALC-SCNC: 12 MMO/L — SIGNIFICANT CHANGE UP (ref 7–14)
BUN SERPL-MCNC: 9 MG/DL — SIGNIFICANT CHANGE UP (ref 7–23)
CALCIUM SERPL-MCNC: 8.2 MG/DL — LOW (ref 8.4–10.5)
CHLORIDE SERPL-SCNC: 98 MMOL/L — SIGNIFICANT CHANGE UP (ref 98–107)
CO2 SERPL-SCNC: 23 MMOL/L — SIGNIFICANT CHANGE UP (ref 22–31)
CREAT SERPL-MCNC: 0.91 MG/DL — SIGNIFICANT CHANGE UP (ref 0.5–1.3)
GLUCOSE BLDC GLUCOMTR-MCNC: 100 MG/DL — HIGH (ref 70–99)
GLUCOSE BLDC GLUCOMTR-MCNC: 119 MG/DL — HIGH (ref 70–99)
GLUCOSE BLDC GLUCOMTR-MCNC: 136 MG/DL — HIGH (ref 70–99)
GLUCOSE BLDC GLUCOMTR-MCNC: 136 MG/DL — HIGH (ref 70–99)
GLUCOSE SERPL-MCNC: 100 MG/DL — HIGH (ref 70–99)
HCT VFR BLD CALC: 32.9 % — LOW (ref 39–50)
HGB BLD-MCNC: 10.8 G/DL — LOW (ref 13–17)
MAGNESIUM SERPL-MCNC: 1.9 MG/DL — SIGNIFICANT CHANGE UP (ref 1.6–2.6)
MCHC RBC-ENTMCNC: 30.3 PG — SIGNIFICANT CHANGE UP (ref 27–34)
MCHC RBC-ENTMCNC: 32.8 % — SIGNIFICANT CHANGE UP (ref 32–36)
MCV RBC AUTO: 92.4 FL — SIGNIFICANT CHANGE UP (ref 80–100)
NRBC # FLD: 0 K/UL — LOW (ref 25–125)
PHOSPHATE SERPL-MCNC: 2.1 MG/DL — LOW (ref 2.5–4.5)
PLATELET # BLD AUTO: 320 K/UL — SIGNIFICANT CHANGE UP (ref 150–400)
PMV BLD: 9.1 FL — SIGNIFICANT CHANGE UP (ref 7–13)
POTASSIUM SERPL-MCNC: 3.9 MMOL/L — SIGNIFICANT CHANGE UP (ref 3.5–5.3)
POTASSIUM SERPL-SCNC: 3.9 MMOL/L — SIGNIFICANT CHANGE UP (ref 3.5–5.3)
RBC # BLD: 3.56 M/UL — LOW (ref 4.2–5.8)
RBC # FLD: 11.9 % — SIGNIFICANT CHANGE UP (ref 10.3–14.5)
SODIUM SERPL-SCNC: 133 MMOL/L — LOW (ref 135–145)
WBC # BLD: 9.11 K/UL — SIGNIFICANT CHANGE UP (ref 3.8–10.5)
WBC # FLD AUTO: 9.11 K/UL — SIGNIFICANT CHANGE UP (ref 3.8–10.5)

## 2019-01-30 RX ORDER — ASPIRIN/CALCIUM CARB/MAGNESIUM 324 MG
1 TABLET ORAL
Qty: 0 | Refills: 0 | COMMUNITY

## 2019-01-30 RX ORDER — OXYCODONE HYDROCHLORIDE 5 MG/1
1 TABLET ORAL
Qty: 12 | Refills: 0 | OUTPATIENT
Start: 2019-01-30

## 2019-01-30 RX ORDER — OXYCODONE HYDROCHLORIDE 5 MG/1
1 TABLET ORAL
Qty: 0 | Refills: 0 | COMMUNITY
Start: 2019-01-30

## 2019-01-30 RX ORDER — ASPIRIN/CALCIUM CARB/MAGNESIUM 324 MG
1 TABLET ORAL
Qty: 0 | Refills: 0 | COMMUNITY
Start: 2019-01-30

## 2019-01-30 RX ORDER — ATORVASTATIN CALCIUM 80 MG/1
1 TABLET, FILM COATED ORAL
Qty: 0 | Refills: 0 | COMMUNITY
Start: 2019-01-30

## 2019-01-30 RX ORDER — ACETAMINOPHEN 500 MG
2 TABLET ORAL
Qty: 0 | Refills: 0 | COMMUNITY
Start: 2019-01-30

## 2019-01-30 RX ORDER — MAGNESIUM SULFATE 500 MG/ML
1 VIAL (ML) INJECTION ONCE
Qty: 0 | Refills: 0 | Status: COMPLETED | OUTPATIENT
Start: 2019-01-30 | End: 2019-01-30

## 2019-01-30 RX ORDER — TAMSULOSIN HYDROCHLORIDE 0.4 MG/1
1 CAPSULE ORAL
Qty: 14 | Refills: 0 | OUTPATIENT
Start: 2019-01-30 | End: 2019-02-12

## 2019-01-30 RX ORDER — LISINOPRIL 2.5 MG/1
1 TABLET ORAL
Qty: 0 | Refills: 0 | COMMUNITY
Start: 2019-01-30

## 2019-01-30 RX ADMIN — Medication 650 MILLIGRAM(S): at 02:30

## 2019-01-30 RX ADMIN — Medication 100 GRAM(S): at 10:48

## 2019-01-30 RX ADMIN — ENOXAPARIN SODIUM 40 MILLIGRAM(S): 100 INJECTION SUBCUTANEOUS at 18:36

## 2019-01-30 RX ADMIN — Medication 63.75 MILLIMOLE(S): at 10:48

## 2019-01-30 RX ADMIN — Medication 650 MILLIGRAM(S): at 01:47

## 2019-01-30 RX ADMIN — ATORVASTATIN CALCIUM 40 MILLIGRAM(S): 80 TABLET, FILM COATED ORAL at 21:16

## 2019-01-30 RX ADMIN — LISINOPRIL 10 MILLIGRAM(S): 2.5 TABLET ORAL at 05:38

## 2019-01-30 RX ADMIN — TAMSULOSIN HYDROCHLORIDE 0.4 MILLIGRAM(S): 0.4 CAPSULE ORAL at 21:16

## 2019-01-30 RX ADMIN — Medication 1000 MILLIGRAM(S): at 00:36

## 2019-01-30 RX ADMIN — Medication 81 MILLIGRAM(S): at 18:36

## 2019-01-30 NOTE — PROVIDER CONTACT NOTE (OTHER) - ASSESSMENT
pt A&Ox3, asymptomatic
patient does not feel urge to urinate
patient does not feel urge to urinate, could not straight cath

## 2019-01-30 NOTE — DISCHARGE NOTE ADULT - INSTRUCTIONS
Regular low fiber diet, diabetic diet Watch for signs of infection; redness, swelling, fever, chills or heat, report such symptoms to the MD. No driving while taking pain medication, it causes drowsiness & constipation. Shower as directed by MD, do not scrub site, allow water to run over incision & pat dry. Do not apply any lotions, ointments or powders to incision. No heavy lifting. Drink 6-8 glasses of fluids daily to promote hydration. No heavy lifting, pulling or pushing heavy objects. Follow up with primary & surgical MD.

## 2019-01-30 NOTE — DISCHARGE NOTE ADULT - HOME CARE AGENCY
[facility] [phone]. Nurse to visit on the day following discharge. Other appropriate services to be arranged thereafter.   Please contact the home care agency at the above phone number if you have not heard from them by approximately 12 noon on the day after your hospital discharge.

## 2019-01-30 NOTE — DISCHARGE NOTE ADULT - CARE PROVIDER_API CALL
Luis Varela)  Surgery  [address] [place] [address], [place] [address]  Phone: [phone]  Fax: [phone] Luis Varela)  Surgery  [id number] [place] [address], [place] [address]  Phone: [phone]  Fax: [phone]    Dylan Saldaña (MD)  Gastroenterology; Internal Medicine  Division of Gastroenterology  [address] [place], [place] [address]  Phone: [phone]  Fax: [phone]

## 2019-01-30 NOTE — CHART NOTE - NSCHARTNOTEFT_GEN_A_CORE
RN notified provider of elevated BP to 164/90. As pt is asymptomatic and currently takes his home dose of lisinopril, will continue to monitor. Pt denied CP, SOB, HA, dizziness, blurry vision. Will repeat and evaluate.    Fransisca Rodriguez, PGY-1  [facility] General Surgery- B Team  [id number] RN notified provider of elevated BP to 164/90. Pt is asymptomatic, denies HA, blurry vision, CP, SOB. Currently on home dose of lisinopril- taken in the AM. Will provide metoprolol 25mg once, and re-evaluate.     Fransisca Rodriguez, PGY-1  [facility] General Surgery- B Team  [id number]

## 2019-01-30 NOTE — PROGRESS NOTE ADULT - ASSESSMENT
73 M with PMHx prostrate CA s/p resection (2000), L RCC s/p L partial nephrectomy (2000), HLD, HTN p/w abd pain/constipation x5days, flatus/no BM. CT concerning for partial LBO at distal descending colon 2/2 underlying malignancy. Now s/p transverse colectomy for LBO and serosal tears on 1/27.     Plan  - Pain control: PO pain meds  - DIET: LRD  - D/c'd reyes on rounds, f/u TOV at 1430  - DVT ppx: Lovenox  - PT recs: home, no skilled PT needs  - D/c today pending TOV    Fransisca Rodriguez, PGY-1  [facility] General Surgery- B Team  [id number]

## 2019-01-30 NOTE — DISCHARGE NOTE ADULT - MEDICATION SUMMARY - MEDICATIONS TO TAKE
I will START or STAY ON the medications listed below when I get home from the hospital:    acetaminophen 325 mg oral tablet  -- 2 tab(s) by mouth every 6 hours  -- Indication: For pain medication    aspirin 81 mg oral delayed release tablet  -- 1 tab(s) by mouth once a day  -- Indication: For Home medication    oxyCODONE 5 mg oral tablet  -- 1 tab(s) by mouth every 6 hours, As Needed  for severe pain MDD:4  -- Indication: For pain medication    lisinopril 10 mg oral tablet  -- 1 tab(s) by mouth once a day  -- Indication: For Home medication    tamsulosin 0.4 mg oral capsule  -- 1 cap(s) by mouth once a day (at bedtime)  -- Indication: For urinary retention    metFORMIN  -- Indication: For Home medication    atorvastatin 40 mg oral tablet  -- 1 tab(s) by mouth once a day (at bedtime)  -- Indication: For Home medication

## 2019-01-30 NOTE — DISCHARGE NOTE ADULT - CARE PROVIDERS DIRECT ADDRESSES
[email or web address]

## 2019-01-30 NOTE — DISCHARGE NOTE ADULT - PLAN OF CARE
s/p WOUND CARE:  Please keep incisions clean and dry. Please do not Scrub or rub incisions. Do not use lotion or powder on incisions.   BATHING: You may shower and/or sponge bathe. You may use warm soapy water in the shower and rinse, pat dry.  ACTIVITY: No heavy lifting or straining. Otherwise, you may return to your usual level of physical activity. If you are taking narcotic pain medication DO NOT drive a car, operate machinery or make important decisions.  DIET: low fiber, diabetic diet  NOTIFY YOUR SURGEON IF YOU HAVE: any bleeding that does not stop, any pus draining from your wound(s), any fever (over 100.4 F) persistent nausea/vomiting, or if your pain is not controlled on your discharge pain medications, unable to urinate.  Please follow up with your primary care physician in one week regarding your hospitalization, bring copies of your discharge paperwork.  Please follow up with your surgeon, Dr. Varela as an outpatient, please call to schedule appointment please continue your home medications and follow up with your PMD as an outpatient in 1 week, please call to schedule appointment s/p exploratory laparotomy, transverse colectomy, end colostomy, mucous fistula WOUND CARE:  Please keep incisions clean and dry. Please do not Scrub or rub incisions. Do not use lotion or powder on incisions. Staples to be removed at follow up appointment. Ostomy care as taught  BATHING: You may shower and/or sponge bathe. You may use warm soapy water in the shower and rinse, pat dry.  ACTIVITY: No heavy lifting or straining. Otherwise, you may return to your usual level of physical activity. If you are taking narcotic pain medication DO NOT drive a car, operate machinery or make important decisions.  DIET: low fiber, diabetic diet  NOTIFY YOUR SURGEON IF YOU HAVE: any bleeding that does not stop, any pus draining from your wound(s), any fever (over 100.4 F) persistent nausea/vomiting, or if your pain is not controlled on your discharge pain medications, unable to urinate.  Please follow up with your primary care physician in one week regarding your hospitalization, bring copies of your discharge paperwork.  Please follow up with your surgeon, Dr. Varela as an outpatient, please call [phone] to schedule appointment  Please follow up with Gastroenterology Dr Saldaña as an outpatient, please call [phone] to schedule appointment WOUND CARE:  Please keep incisions clean and dry. Please do not Scrub or rub incisions. Do not use lotion or powder on incisions. Staples to be removed at follow up appointment. Ostomy care as taught in hospital   BATHING: You may shower and/or sponge bathe. You may use warm soapy water in the shower and rinse, pat dry.  ACTIVITY: No heavy lifting or straining. Otherwise, you may return to your usual level of physical activity. If you are taking narcotic pain medication DO NOT drive a car, operate machinery or make important decisions.  DIET: low fiber, diabetic diet  NOTIFY YOUR SURGEON IF YOU HAVE: any bleeding that does not stop, any pus draining from your wound(s), any fever (over 100.4 F) persistent nausea/vomiting, or if your pain is not controlled on your discharge pain medications, unable to urinate.  Please follow up with your primary care physician in one week regarding your hospitalization, bring copies of your discharge paperwork.  Please follow up with your surgeon, Dr. Varela as an outpatient, please call [phone] to schedule appointment  Please follow up with Gastroenterology Dr Saldaña as an outpatient to have biopsy performed,  please call [phone] to schedule appointment WOUND CARE:  Please keep incisions clean and dry. Please do not Scrub or rub incisions. Do not use lotion or powder on incisions. Staples to be removed at follow up appointment. Ostomy care as taught in hospital   BATHING: You may shower and/or sponge bathe. You may use warm soapy water in the shower and rinse, pat dry.  ACTIVITY: No heavy lifting or straining. Otherwise, you may return to your usual level of physical activity. If you are taking narcotic pain medication DO NOT drive a car, operate machinery or make important decisions.  DIET: low fiber, diabetic diet  NOTIFY YOUR SURGEON IF YOU HAVE: any bleeding that does not stop, any pus draining from your wound(s), any fever (over 100.4 F) persistent nausea/vomiting, or if your pain is not controlled on your discharge pain medications, unable to urinate, or notice increased output from your ostomy   Please follow up with your primary care physician in one week regarding your hospitalization, bring copies of your discharge paperwork.  Please follow up with your surgeon, Dr. Varela as an outpatient, please call [phone] to schedule appointment  Please follow up with Gastroenterology Dr Saldaña as an outpatient to have biopsy performed,  please call [phone] to schedule appointment

## 2019-01-30 NOTE — PROVIDER CONTACT NOTE (OTHER) - SITUATION
Shepard d/c today @1000. Patient due to void at 1800, did not void. Bladder scan done: 213mL in bladder.
Shepard d/c today @1000. Patient due to void at 1800, did not void. Bladder scan done: 213mL in bladder. Order to straight cath. Attempted to straight cath, resistance met.
/90 HR 81, denies chest pain, denies headache

## 2019-01-30 NOTE — DISCHARGE NOTE ADULT - NS AS ACTIVITY OBS
Walking-Outdoors allowed/Walking-Indoors allowed/No Heavy lifting/straining/Do not drive or operate machinery/Do not make important decisions/Showering allowed/Stairs allowed

## 2019-01-30 NOTE — DISCHARGE NOTE ADULT - CARE PLAN
Principal Discharge DX:	Large bowel obstruction  Goal:	s/p  Assessment and plan of treatment:	WOUND CARE:  Please keep incisions clean and dry. Please do not Scrub or rub incisions. Do not use lotion or powder on incisions.   BATHING: You may shower and/or sponge bathe. You may use warm soapy water in the shower and rinse, pat dry.  ACTIVITY: No heavy lifting or straining. Otherwise, you may return to your usual level of physical activity. If you are taking narcotic pain medication DO NOT drive a car, operate machinery or make important decisions.  DIET: low fiber, diabetic diet  NOTIFY YOUR SURGEON IF YOU HAVE: any bleeding that does not stop, any pus draining from your wound(s), any fever (over 100.4 F) persistent nausea/vomiting, or if your pain is not controlled on your discharge pain medications, unable to urinate.  Please follow up with your primary care physician in one week regarding your hospitalization, bring copies of your discharge paperwork.  Please follow up with your surgeon, Dr. Varela as an outpatient, please call to schedule appointment  Secondary Diagnosis:	Benign essential hypertension  Assessment and plan of treatment:	please continue your home medications and follow up with your PMD as an outpatient in 1 week, please call to schedule appointment Principal Discharge DX:	Large bowel obstruction  Goal:	s/p exploratory laparotomy, transverse colectomy, end colostomy, mucous fistula  Assessment and plan of treatment:	WOUND CARE:  Please keep incisions clean and dry. Please do not Scrub or rub incisions. Do not use lotion or powder on incisions. Staples to be removed at follow up appointment. Ostomy care as taught  BATHING: You may shower and/or sponge bathe. You may use warm soapy water in the shower and rinse, pat dry.  ACTIVITY: No heavy lifting or straining. Otherwise, you may return to your usual level of physical activity. If you are taking narcotic pain medication DO NOT drive a car, operate machinery or make important decisions.  DIET: low fiber, diabetic diet  NOTIFY YOUR SURGEON IF YOU HAVE: any bleeding that does not stop, any pus draining from your wound(s), any fever (over 100.4 F) persistent nausea/vomiting, or if your pain is not controlled on your discharge pain medications, unable to urinate.  Please follow up with your primary care physician in one week regarding your hospitalization, bring copies of your discharge paperwork.  Please follow up with your surgeon, Dr. Varela as an outpatient, please call [phone] to schedule appointment  Please follow up with Gastroenterology Dr Saldaña as an outpatient, please call [phone] to schedule appointment  Secondary Diagnosis:	Benign essential hypertension  Assessment and plan of treatment:	please continue your home medications and follow up with your PMD as an outpatient in 1 week, please call to schedule appointment Principal Discharge DX:	Large bowel obstruction  Goal:	s/p exploratory laparotomy, transverse colectomy, end colostomy, mucous fistula  Assessment and plan of treatment:	WOUND CARE:  Please keep incisions clean and dry. Please do not Scrub or rub incisions. Do not use lotion or powder on incisions. Staples to be removed at follow up appointment. Ostomy care as taught in hospital   BATHING: You may shower and/or sponge bathe. You may use warm soapy water in the shower and rinse, pat dry.  ACTIVITY: No heavy lifting or straining. Otherwise, you may return to your usual level of physical activity. If you are taking narcotic pain medication DO NOT drive a car, operate machinery or make important decisions.  DIET: low fiber, diabetic diet  NOTIFY YOUR SURGEON IF YOU HAVE: any bleeding that does not stop, any pus draining from your wound(s), any fever (over 100.4 F) persistent nausea/vomiting, or if your pain is not controlled on your discharge pain medications, unable to urinate.  Please follow up with your primary care physician in one week regarding your hospitalization, bring copies of your discharge paperwork.  Please follow up with your surgeon, Dr. Varela as an outpatient, please call [phone] to schedule appointment  Please follow up with Gastroenterology Dr Saldaña as an outpatient to have biopsy performed,  please call [phone] to schedule appointment  Secondary Diagnosis:	Benign essential hypertension  Assessment and plan of treatment:	please continue your home medications and follow up with your PMD as an outpatient in 1 week, please call to schedule appointment Principal Discharge DX:	Large bowel obstruction  Goal:	s/p exploratory laparotomy, transverse colectomy, end colostomy, mucous fistula  Assessment and plan of treatment:	WOUND CARE:  Please keep incisions clean and dry. Please do not Scrub or rub incisions. Do not use lotion or powder on incisions. Staples to be removed at follow up appointment. Ostomy care as taught in hospital   BATHING: You may shower and/or sponge bathe. You may use warm soapy water in the shower and rinse, pat dry.  ACTIVITY: No heavy lifting or straining. Otherwise, you may return to your usual level of physical activity. If you are taking narcotic pain medication DO NOT drive a car, operate machinery or make important decisions.  DIET: low fiber, diabetic diet  NOTIFY YOUR SURGEON IF YOU HAVE: any bleeding that does not stop, any pus draining from your wound(s), any fever (over 100.4 F) persistent nausea/vomiting, or if your pain is not controlled on your discharge pain medications, unable to urinate, or notice increased output from your ostomy   Please follow up with your primary care physician in one week regarding your hospitalization, bring copies of your discharge paperwork.  Please follow up with your surgeon, Dr. Varela as an outpatient, please call [phone] to schedule appointment  Please follow up with Gastroenterology Dr Saldaña as an outpatient to have biopsy performed,  please call [phone] to schedule appointment  Secondary Diagnosis:	Benign essential hypertension  Assessment and plan of treatment:	please continue your home medications and follow up with your PMD as an outpatient in 1 week, please call to schedule appointment

## 2019-01-30 NOTE — DISCHARGE NOTE ADULT - PATIENT PORTAL LINK FT
You can access the [facility] Patient Portal, offered by [facility], by registering with the following website: [email or web address]

## 2019-01-30 NOTE — DISCHARGE NOTE ADULT - HOSPITAL COURSE
72yo M with hx prostate ca s/p resection (2000), left kidney ca s/p L partial nephrectomy (2000), HLD, HTN, on metformin but reportedly not diabetic, now presents to the ED for the third day in a row with chief complaint of crampy abdominal pain, constipation x 5 days. He is still passing gas but has not had a bowel movement. His last colonoscopy was in 2010 by Dr. Bauer and he reportedly had a number of polyps that were resected at that time and were "benign." During his first visit to the ED on 1/24, he had a CT that showed colitis and some eccentric wall thickening in the rectum with perirectal adenopathy. He was scheduled to have a colonoscopy with his GI doctor this upcoming Monday (1/28). He re-presented yesterday and today with repeated symptoms.    CT scan performed and showed partial large bowel obstruction to the level of the distal  descending colon with pericolonic adenopathy concerning for underlying  malignancy. Consider further evaluation with colonoscopy.    Pt admitted to surgical service. GI consulted.     1/27 Pt flexible sigmoidoscopy performed and showed diverticulosis in the recto-sigmoid colon and in the sigmoid colon.  - High grade colonic obstruction with distal extent at 16 cm from the anal verge. Unable to visualize  lumen endoscopically or fluoroscopically beyond this area. Extrinsic compression present, cannot rule  out associated intrinsic colonic mass. Stent was not attempted due to unfavorable anatomy.    Pt then taken to the OR for exploratory laparotomy, transverse colectomy, end colostomy, mucous fistula. Pt tolerated procedure well 72yo M with hx prostate ca s/p resection (2000), left kidney ca s/p L partial nephrectomy (2000), HLD, HTN, on metformin but reportedly not diabetic, now presents to the ED for the third day in a row with chief complaint of crampy abdominal pain, constipation x 5 days. He is still passing gas but has not had a bowel movement. His last colonoscopy was in 2010 by Dr. Bauer and he reportedly had a number of polyps that were resected at that time and were "benign." During his first visit to the ED on 1/24, he had a CT that showed colitis and some eccentric wall thickening in the rectum with perirectal adenopathy. He was scheduled to have a colonoscopy with his GI doctor this upcoming Monday (1/28). He re-presented yesterday and today with repeated symptoms.    CT scan performed and showed partial large bowel obstruction to the level of the distal  descending colon with pericolonic adenopathy concerning for underlying  malignancy. Consider further evaluation with colonoscopy.    Pt admitted to surgical service. Attempted decompression with rigid sigmoidoscopy but unsuccessful. GI consulted for possible stent. Urology consulted to assist with reyes placement     1/27 Pt underwent flexible sigmoidoscopy and showed diverticulosis in the recto-sigmoid colon and in the sigmoid colon. High grade colonic obstruction with distal extent at 16 cm from the anal verge. Unable to visualize lumen endoscopically or fluoroscopically beyond this area. Extrinsic compression present, cannot rule out associated intrinsic colonic mass. Stent was not attempted due to unfavorable anatomy.    Pt then taken to the OR for exploratory laparotomy, transverse colectomy, end colostomy, mucous fistula. Pt tolerated procedure well. 74yo M with hx prostate ca s/p resection (2000), left kidney ca s/p L partial nephrectomy (2000), HLD, HTN, on metformin but reportedly not diabetic, now presents to the ED for the third day in a row with chief complaint of crampy abdominal pain, constipation x 5 days. He is still passing gas but has not had a bowel movement. His last colonoscopy was in 2010 by Dr. Bauer and he reportedly had a number of polyps that were resected at that time and were "benign." During his first visit to the ED on 1/24, he had a CT that showed colitis and some eccentric wall thickening in the rectum with perirectal adenopathy. He was scheduled to have a colonoscopy with his GI doctor this upcoming Monday (1/28). He re-presented yesterday and today with repeated symptoms.    CT scan performed and showed partial large bowel obstruction to the level of the distal  descending colon with pericolonic adenopathy concerning for underlying  malignancy. Consider further evaluation with colonoscopy.    Pt admitted to surgical service. Attempted decompression with rigid sigmoidoscopy but unsuccessful. GI consulted for possible stent. Urology consulted to assist with reyes placement.     1/27 Pt underwent flexible sigmoidoscopy and showed diverticulosis in the recto-sigmoid colon and in the sigmoid colon. High grade colonic obstruction with distal extent at 16 cm from the anal verge. Unable to visualize lumen endoscopically or fluoroscopically beyond this area. Extrinsic compression present, cannot rule out associated intrinsic colonic mass. Stent was not attempted due to unfavorable anatomy.    Pt then taken to the OR for exploratory laparotomy, transverse colectomy, end colostomy, mucous fistula. Pt tolerated procedure well.     Post op pt's diet advanced when +ostomy function.     Pt's reyes removed and failed TOV. Urology re-consulted to assist with reyes re-placement. Pt started on flomax and underwent second TOV and.........    PT consulted and rec dc pt home with no needs    Ostomy nurse consulted and provided teaching while in hospital    Per Attending pt now stable for discharge home. Pt tolerating diet, + ostomy function, and pain well controlled. Pt to follow up with Dr Varela as an outpatient, instructed to call to schedule appointment. Pt to follow up with GI for biopsy, instructed to call to schedule appointment 74yo M with hx prostate ca s/p resection (2000), left kidney ca s/p L partial nephrectomy (2000), HLD, HTN, on metformin but reportedly not diabetic, now presents to the ED for the third day in a row with chief complaint of crampy abdominal pain, constipation x 5 days. He is still passing gas but has not had a bowel movement. His last colonoscopy was in 2010 by Dr. Bauer and he reportedly had a number of polyps that were resected at that time and were "benign." During his first visit to the ED on 1/24, he had a CT that showed colitis and some eccentric wall thickening in the rectum with perirectal adenopathy. He was scheduled to have a colonoscopy with his GI doctor this upcoming Monday (1/28). He re-presented yesterday and today with repeated symptoms.    CT scan performed and showed partial large bowel obstruction to the level of the distal  descending colon with pericolonic adenopathy concerning for underlying  malignancy. Consider further evaluation with colonoscopy.    Pt admitted to surgical service. Attempted decompression with rigid sigmoidoscopy but unsuccessful. GI consulted for possible stent. Urology consulted to assist with reyes placement.     1/27 Pt underwent flexible sigmoidoscopy and showed diverticulosis in the recto-sigmoid colon and in the sigmoid colon. High grade colonic obstruction with distal extent at 16 cm from the anal verge. Unable to visualize lumen endoscopically or fluoroscopically beyond this area. Extrinsic compression present, cannot rule out associated intrinsic colonic mass. Stent was not attempted due to unfavorable anatomy.    Pt then taken to the OR for exploratory laparotomy, transverse colectomy, end colostomy, mucous fistula. Pt tolerated procedure well.     Post op pt's diet advanced when +ostomy function.     Pt's reyes removed and failed TOV. Urology re-consulted to assist with reyes re-placement. Pt started on flomax and underwent second TOV and passed.    PT consulted and rec dc pt home with no needs    Ostomy nurse consulted and provided teaching while in hospital    Per Attending pt now stable for discharge home. Pt tolerating diet, + ostomy function, and pain well controlled. Pt to follow up with Dr Varela as an outpatient, instructed to call to schedule appointment. Pt to follow up with GI for biopsy, instructed to call to schedule appointment. VNS set up prior to discharge     istop : [id number]

## 2019-01-30 NOTE — PROVIDER CONTACT NOTE (OTHER) - BACKGROUND
s/p partial LBO, x-lap with transverse colectomy/colostomy, mucous fistula
partial LBO, x-lap with transverse colectomy/colostomy, mucous fistula
partial LBO, x-lap with transverse colectomy/colostomy, mucous fistula

## 2019-01-30 NOTE — PROGRESS NOTE ADULT - SUBJECTIVE AND OBJECTIVE BOX
General Surgery Progress Note    SUBJECTIVE:  The patient was seen and examined. No acute events overnight. Shepard removed this AM on rounds. TOV due at 1430. Colostomy with liquid stool output. Denies N/V. Radha reg diet. OOB/ambulating.     OBJECTIVE:     ** VITAL SIGNS / I&O's **    Vital Signs Last 24 Hrs  T(C): 36.9 (30 Jan 2019 06:00), Max: 37.1 (29 Jan 2019 10:58)  T(F): 98.5 (30 Jan 2019 06:00), Max: 98.8 (29 Jan 2019 10:58)  HR: 85 (30 Jan 2019 06:00) (82 - 95)  BP: 140/80 (30 Jan 2019 06:00) (140/80 - 152/89)  BP(mean): --  RR: 18 (30 Jan 2019 06:00) (18 - 18)  SpO2: 97% (30 Jan 2019 06:00) (97% - 98%)      29 Jan 2019 07:01  -  30 Jan 2019 07:00  --------------------------------------------------------  IN:    dextrose 5% + sodium chloride 0.45%.: 300 mL    Oral Fluid: 720 mL  Total IN: 1020 mL    OUT:    Ileostomy: 450 mL    Indwelling Catheter - Urethral: 2340 mL  Total OUT: 2790 mL    Total NET: -1770 mL          ** PHYSICAL EXAM **    -- CONSTITUTIONAL: Alert, NAD  -- PULMONARY: non-labored respirations  -- ABDOMEN: soft, non-distended, non-tender, midline incision dressing c/d/i, R colostomy with liquid stool output, L mucous fistula with no output  -- NEURO: A&Ox3    ** LABS **                          10.8   9.11  )-----------( 320      ( 30 Jan 2019 07:15 )             32.9     30 Jan 2019 07:15    133    |  98     |  9      ----------------------------<  100    3.9     |  23     |  0.91     Ca    8.2        30 Jan 2019 07:15  Phos  2.1       30 Jan 2019 07:15  Mg     1.9       30 Jan 2019 07:15        CAPILLARY BLOOD GLUCOSE      POCT Blood Glucose.: 149 mg/dL (29 Jan 2019 22:16)  POCT Blood Glucose.: 96 mg/dL (29 Jan 2019 17:39)  POCT Blood Glucose.: 120 mg/dL (29 Jan 2019 12:09)                MEDICATIONS  (STANDING):  acetaminophen   Tablet .. 650 milliGRAM(s) Oral every 6 hours  aspirin enteric coated 81 milliGRAM(s) Oral daily  atorvastatin 40 milliGRAM(s) Oral at bedtime  dextrose 5%. 1000 milliLiter(s) (50 mL/Hr) IV Continuous <Continuous>  dextrose 50% Injectable 12.5 Gram(s) IV Push once  dextrose 50% Injectable 25 Gram(s) IV Push once  dextrose 50% Injectable 25 Gram(s) IV Push once  enoxaparin Injectable 40 milliGRAM(s) SubCutaneous every 24 hours  insulin lispro (HumaLOG) corrective regimen sliding scale   SubCutaneous Before meals and at bedtime  lisinopril 10 milliGRAM(s) Oral daily  tamsulosin 0.4 milliGRAM(s) Oral at bedtime    MEDICATIONS  (PRN):  dextrose 40% Gel 15 Gram(s) Oral once PRN Blood Glucose LESS THAN 70 milliGRAM(s)/deciliter  glucagon  Injectable 1 milliGRAM(s) IntraMuscular once PRN Glucose LESS THAN 70 milligrams/deciliter  ondansetron Injectable 4 milliGRAM(s) IV Push once PRN Nausea and/or Vomiting  oxyCODONE    IR 5 milliGRAM(s) Oral every 3 hours PRN Moderate Pain (4 - 6)  oxyCODONE    IR 7.5 milliGRAM(s) Oral every 3 hours PRN Severe Pain (7 - 10)

## 2019-01-30 NOTE — DISCHARGE NOTE ADULT - CONDITIONS AT DISCHARGE
Pt A&Ox4. VS stable, afebrile. No complaints of pain during this shift. Pt free of respiratory distress. Abdomen soft  non tender, midline incision with gauze and tape, No s/s of infection noted. Colostomy to RUQ, stoma pink and viable. Pasty stool noted. LUQ with fistula pouch, brown liquid output noted. Stoma pink and viable. Pt tolerating cons carb diet with no n/v. F/S monitored, pt has not needed coverage. OOB w/ standby assist for safety, voiding without diffculty. Pt educated regarding indications and side effects of medications given. Skin integrity maintained. Hourly rounding provided. Call bell within reach. Pt safety maintained. Will continue to monitor. Comfort measures provided. Discharge with ostomy teaching provided to patient and family member, pt and family understood and demonstrated the procedure back with proper technique. Ostomy supplies given to patient. Patient discharged to home. Discharge instructions given and understood. IV removed.

## 2019-01-31 LAB
ANION GAP SERPL CALC-SCNC: 12 MMO/L — SIGNIFICANT CHANGE UP (ref 7–14)
BUN SERPL-MCNC: 13 MG/DL — SIGNIFICANT CHANGE UP (ref 7–23)
CALCIUM SERPL-MCNC: 8.4 MG/DL — SIGNIFICANT CHANGE UP (ref 8.4–10.5)
CHLORIDE SERPL-SCNC: 99 MMOL/L — SIGNIFICANT CHANGE UP (ref 98–107)
CO2 SERPL-SCNC: 23 MMOL/L — SIGNIFICANT CHANGE UP (ref 22–31)
CREAT SERPL-MCNC: 0.99 MG/DL — SIGNIFICANT CHANGE UP (ref 0.5–1.3)
GLUCOSE BLDC GLUCOMTR-MCNC: 107 MG/DL — HIGH (ref 70–99)
GLUCOSE BLDC GLUCOMTR-MCNC: 120 MG/DL — HIGH (ref 70–99)
GLUCOSE BLDC GLUCOMTR-MCNC: 138 MG/DL — HIGH (ref 70–99)
GLUCOSE BLDC GLUCOMTR-MCNC: 98 MG/DL — SIGNIFICANT CHANGE UP (ref 70–99)
GLUCOSE SERPL-MCNC: 123 MG/DL — HIGH (ref 70–99)
MAGNESIUM SERPL-MCNC: 1.9 MG/DL — SIGNIFICANT CHANGE UP (ref 1.6–2.6)
PHOSPHATE SERPL-MCNC: 3.1 MG/DL — SIGNIFICANT CHANGE UP (ref 2.5–4.5)
POTASSIUM SERPL-MCNC: 3.7 MMOL/L — SIGNIFICANT CHANGE UP (ref 3.5–5.3)
POTASSIUM SERPL-SCNC: 3.7 MMOL/L — SIGNIFICANT CHANGE UP (ref 3.5–5.3)
SODIUM SERPL-SCNC: 134 MMOL/L — LOW (ref 135–145)

## 2019-01-31 RX ORDER — POTASSIUM CHLORIDE 20 MEQ
20 PACKET (EA) ORAL ONCE
Qty: 0 | Refills: 0 | Status: COMPLETED | OUTPATIENT
Start: 2019-01-31 | End: 2019-01-31

## 2019-01-31 RX ORDER — METOPROLOL TARTRATE 50 MG
25 TABLET ORAL ONCE
Qty: 0 | Refills: 0 | Status: COMPLETED | OUTPATIENT
Start: 2019-01-31 | End: 2019-01-31

## 2019-01-31 RX ORDER — POTASSIUM CHLORIDE 20 MEQ
10 PACKET (EA) ORAL ONCE
Qty: 0 | Refills: 0 | Status: DISCONTINUED | OUTPATIENT
Start: 2019-01-31 | End: 2019-01-31

## 2019-01-31 RX ADMIN — Medication 81 MILLIGRAM(S): at 18:57

## 2019-01-31 RX ADMIN — Medication 650 MILLIGRAM(S): at 01:26

## 2019-01-31 RX ADMIN — Medication 20 MILLIEQUIVALENT(S): at 18:57

## 2019-01-31 RX ADMIN — Medication 650 MILLIGRAM(S): at 00:34

## 2019-01-31 RX ADMIN — TAMSULOSIN HYDROCHLORIDE 0.4 MILLIGRAM(S): 0.4 CAPSULE ORAL at 21:06

## 2019-01-31 RX ADMIN — LISINOPRIL 10 MILLIGRAM(S): 2.5 TABLET ORAL at 05:14

## 2019-01-31 RX ADMIN — Medication 25 MILLIGRAM(S): at 00:34

## 2019-01-31 RX ADMIN — ENOXAPARIN SODIUM 40 MILLIGRAM(S): 100 INJECTION SUBCUTANEOUS at 18:57

## 2019-01-31 RX ADMIN — ATORVASTATIN CALCIUM 40 MILLIGRAM(S): 80 TABLET, FILM COATED ORAL at 21:06

## 2019-01-31 NOTE — PROGRESS NOTE ADULT - SUBJECTIVE AND OBJECTIVE BOX
B-Team Surgery Progress Note     Subjective/24hour Events: Ostomy output high overnight. Pain controlled. Tolerating consistent carb low fiber diet. No N/V. No CP or SOB.    Vital Signs:  Vital Signs Last 24 Hrs  T(C): 37.3 (31 Jan 2019 10:00), Max: 37.4 (30 Jan 2019 21:56)  T(F): 99.2 (31 Jan 2019 10:00), Max: 99.4 (30 Jan 2019 21:56)  HR: 79 (31 Jan 2019 10:00) (71 - 90)  BP: 142/76 (31 Jan 2019 10:00) (142/76 - 168/92)  BP(mean): --  RR: 17 (31 Jan 2019 10:00) (17 - 18)  SpO2: 97% (31 Jan 2019 10:00) (97% - 100%)    CAPILLARY BLOOD GLUCOSE      POCT Blood Glucose.: 138 mg/dL (31 Jan 2019 11:59)  POCT Blood Glucose.: 98 mg/dL (31 Jan 2019 08:01)  POCT Blood Glucose.: 119 mg/dL (30 Jan 2019 21:33)  POCT Blood Glucose.: 136 mg/dL (30 Jan 2019 17:51)      I&O's Detail    30 Jan 2019 07:01  -  31 Jan 2019 07:00  --------------------------------------------------------  IN:    Oral Fluid: 870 mL  Total IN: 870 mL    OUT:    Colostomy: 1350 mL    Drain: 660 mL    Voided: 1400 mL  Total OUT: 3410 mL    Total NET: -2540 mL      31 Jan 2019 07:01  -  31 Jan 2019 12:57  --------------------------------------------------------  IN:    Oral Fluid: 240 mL  Total IN: 240 mL    OUT:    Colostomy: 325 mL    Voided: 100 mL  Total OUT: 425 mL    Total NET: -185 mL            MEDICATIONS  (STANDING):  aspirin enteric coated 81 milliGRAM(s) Oral daily  atorvastatin 40 milliGRAM(s) Oral at bedtime  dextrose 5%. 1000 milliLiter(s) (50 mL/Hr) IV Continuous <Continuous>  dextrose 50% Injectable 12.5 Gram(s) IV Push once  dextrose 50% Injectable 25 Gram(s) IV Push once  dextrose 50% Injectable 25 Gram(s) IV Push once  enoxaparin Injectable 40 milliGRAM(s) SubCutaneous every 24 hours  insulin lispro (HumaLOG) corrective regimen sliding scale   SubCutaneous Before meals and at bedtime  lisinopril 10 milliGRAM(s) Oral daily  tamsulosin 0.4 milliGRAM(s) Oral at bedtime    MEDICATIONS  (PRN):  dextrose 40% Gel 15 Gram(s) Oral once PRN Blood Glucose LESS THAN 70 milliGRAM(s)/deciliter  glucagon  Injectable 1 milliGRAM(s) IntraMuscular once PRN Glucose LESS THAN 70 milligrams/deciliter  ondansetron Injectable 4 milliGRAM(s) IV Push once PRN Nausea and/or Vomiting  oxyCODONE    IR 5 milliGRAM(s) Oral every 3 hours PRN Moderate Pain (4 - 6)  oxyCODONE    IR 7.5 milliGRAM(s) Oral every 3 hours PRN Severe Pain (7 - 10)        Physical Exam:  Gen: NAD  LS: nml respiratory effort  Card: pulse regularly present  GI: abd soft, nontender, R. colostomy w gas and stool, L mucous fistula, midline incision w/ dressing c/d/i  Ext: warm      Labs:    01-30    133<L>  |  98  |  9   ----------------------------<  100<H>  3.9   |  23  |  0.91    Ca    8.2<L>      30 Jan 2019 07:15  Phos  2.1     01-30  Mg     1.9     01-30                              10.8   9.11  )-----------( 320      ( 30 Jan 2019 07:15 )             32.9

## 2019-01-31 NOTE — PROGRESS NOTE ADULT - ASSESSMENT
73 M with PMHx prostrate CA s/p resection (2000), L RCC s/p L partial nephrectomy (2000), HLD, HTN p/w abd pain/constipation x5days, flatus/no BM. CT concerning for partial LBO at distal descending colon 2/2 underlying malignancy. Now s/p transverse colectomy for LBO and serosal tears on 1/27.     Plan  - Pain control: Oxy IR 5&7.5 PO q3h PRN for moderate and severe pain,   - Consistent carb low finer diet  - Monitor ostomy output (currently high)  - D/c home tomorrow pending regulation of ostomy output  - DVT ppx: Lovenox

## 2019-02-01 VITALS
SYSTOLIC BLOOD PRESSURE: 153 MMHG | OXYGEN SATURATION: 99 % | HEART RATE: 98 BPM | TEMPERATURE: 99 F | DIASTOLIC BLOOD PRESSURE: 87 MMHG | RESPIRATION RATE: 17 BRPM

## 2019-02-01 LAB — GLUCOSE BLDC GLUCOMTR-MCNC: 95 MG/DL — SIGNIFICANT CHANGE UP (ref 70–99)

## 2019-02-01 RX ADMIN — Medication 81 MILLIGRAM(S): at 17:07

## 2019-02-01 RX ADMIN — LISINOPRIL 10 MILLIGRAM(S): 2.5 TABLET ORAL at 05:18

## 2019-02-01 NOTE — PROGRESS NOTE ADULT - SUBJECTIVE AND OBJECTIVE BOX
B-Team Surgery Progress Note     Subjective/24hour Events: No acute events overnight. Pain controlled. Tolerating consistent carb low fiber diet. No N/V. No CP or SOB.    Vital Signs:  Vital Signs Last 24 Hrs  T(C): 37.2 (01 Feb 2019 05:17), Max: 37.3 (31 Jan 2019 10:00)  T(F): 98.9 (01 Feb 2019 05:17), Max: 99.2 (31 Jan 2019 10:00)  HR: 84 (01 Feb 2019 05:17) (72 - 84)  BP: 150/89 (01 Feb 2019 05:17) (142/76 - 162/94)  BP(mean): --  RR: 18 (01 Feb 2019 05:17) (16 - 18)  SpO2: 99% (01 Feb 2019 05:17) (97% - 100%)    CAPILLARY BLOOD GLUCOSE      POCT Blood Glucose.: 95 mg/dL (01 Feb 2019 07:55)  POCT Blood Glucose.: 120 mg/dL (31 Jan 2019 22:04)  POCT Blood Glucose.: 107 mg/dL (31 Jan 2019 18:01)  POCT Blood Glucose.: 138 mg/dL (31 Jan 2019 11:59)      I&O's Detail    31 Jan 2019 07:01  -  01 Feb 2019 07:00  --------------------------------------------------------  IN:    Oral Fluid: 680 mL  Total IN: 680 mL    OUT:    Colostomy: 875 mL    Drain: 45 mL    Voided: 975 mL  Total OUT: 1895 mL    Total NET: -1215 mL            MEDICATIONS  (STANDING):  aspirin enteric coated 81 milliGRAM(s) Oral daily  atorvastatin 40 milliGRAM(s) Oral at bedtime  dextrose 5%. 1000 milliLiter(s) (50 mL/Hr) IV Continuous <Continuous>  dextrose 50% Injectable 12.5 Gram(s) IV Push once  dextrose 50% Injectable 25 Gram(s) IV Push once  dextrose 50% Injectable 25 Gram(s) IV Push once  enoxaparin Injectable 40 milliGRAM(s) SubCutaneous every 24 hours  insulin lispro (HumaLOG) corrective regimen sliding scale   SubCutaneous Before meals and at bedtime  lisinopril 10 milliGRAM(s) Oral daily  tamsulosin 0.4 milliGRAM(s) Oral at bedtime    MEDICATIONS  (PRN):  dextrose 40% Gel 15 Gram(s) Oral once PRN Blood Glucose LESS THAN 70 milliGRAM(s)/deciliter  glucagon  Injectable 1 milliGRAM(s) IntraMuscular once PRN Glucose LESS THAN 70 milligrams/deciliter  ondansetron Injectable 4 milliGRAM(s) IV Push once PRN Nausea and/or Vomiting  oxyCODONE    IR 5 milliGRAM(s) Oral every 3 hours PRN Moderate Pain (4 - 6)  oxyCODONE    IR 7.5 milliGRAM(s) Oral every 3 hours PRN Severe Pain (7 - 10)        Physical Exam:  Gen: NAD  LS: nml respiratory effort  Card: pulse regularly present  GI: abd soft, nontender, ostomy w/ stool and gas  Ext: warm      Labs:    01-31    134<L>  |  99  |  13  ----------------------------<  123<H>  3.7   |  23  |  0.99    Ca    8.4      31 Jan 2019 15:57  Phos  3.1     01-31  Mg     1.9     01-31

## 2019-02-01 NOTE — PROGRESS NOTE ADULT - ASSESSMENT
73 M with PMHx prostrate CA s/p resection (2000), L RCC s/p L partial nephrectomy (2000), HLD, HTN p/w abd pain/constipation x5days, flatus/no BM. CT concerning for partial LBO at distal descending colon 2/2 underlying malignancy. Now s/p transverse colectomy for LBO and serosal tears on 1/27.     Plan  - Pain control: Oxy IR 5&7.5 PO q3h PRN for moderate and severe pain,   - Consistent carb low finer diet  - Monitor ostomy output  - DVT ppx: Lovenox  - D/c home today

## 2019-02-01 NOTE — PROGRESS NOTE ADULT - REASON FOR ADMISSION
partial large bowel obstruction

## 2019-02-05 ENCOUNTER — APPOINTMENT (OUTPATIENT)
Dept: SURGERY | Facility: CLINIC | Age: 74
End: 2019-02-05
Payer: MEDICARE

## 2019-02-05 VITALS
DIASTOLIC BLOOD PRESSURE: 75 MMHG | TEMPERATURE: 97.7 F | BODY MASS INDEX: 22.2 KG/M2 | RESPIRATION RATE: 18 BRPM | WEIGHT: 130 LBS | HEART RATE: 84 BPM | SYSTOLIC BLOOD PRESSURE: 151 MMHG | HEIGHT: 64 IN | OXYGEN SATURATION: 97 %

## 2019-02-05 PROCEDURE — 99024 POSTOP FOLLOW-UP VISIT: CPT

## 2019-02-06 LAB — SURGICAL PATHOLOGY STUDY: SIGNIFICANT CHANGE UP

## 2019-02-14 ENCOUNTER — APPOINTMENT (OUTPATIENT)
Dept: COLORECTAL SURGERY | Facility: CLINIC | Age: 74
End: 2019-02-14
Payer: MEDICARE

## 2019-02-14 VITALS
OXYGEN SATURATION: 100 % | RESPIRATION RATE: 16 BRPM | DIASTOLIC BLOOD PRESSURE: 99 MMHG | BODY MASS INDEX: 24.55 KG/M2 | HEART RATE: 83 BPM | HEIGHT: 61 IN | SYSTOLIC BLOOD PRESSURE: 170 MMHG | WEIGHT: 130 LBS

## 2019-02-14 DIAGNOSIS — Z87.891 PERSONAL HISTORY OF NICOTINE DEPENDENCE: ICD-10-CM

## 2019-02-14 DIAGNOSIS — Z86.39 PERSONAL HISTORY OF OTHER ENDOCRINE, NUTRITIONAL AND METABOLIC DISEASE: ICD-10-CM

## 2019-02-14 DIAGNOSIS — Z80.41 FAMILY HISTORY OF MALIGNANT NEOPLASM OF OVARY: ICD-10-CM

## 2019-02-14 DIAGNOSIS — Z85.46 PERSONAL HISTORY OF MALIGNANT NEOPLASM OF PROSTATE: ICD-10-CM

## 2019-02-14 DIAGNOSIS — Z80.51 FAMILY HISTORY OF MALIGNANT NEOPLASM OF KIDNEY: ICD-10-CM

## 2019-02-14 DIAGNOSIS — Z86.79 PERSONAL HISTORY OF OTHER DISEASES OF THE CIRCULATORY SYSTEM: ICD-10-CM

## 2019-02-14 DIAGNOSIS — Z86.018 PERSONAL HISTORY OF OTHER BENIGN NEOPLASM: ICD-10-CM

## 2019-02-14 PROCEDURE — 99204 OFFICE O/P NEW MOD 45 MIN: CPT

## 2019-02-14 RX ORDER — IRBESARTAN 150 MG/1
150 TABLET ORAL
Qty: 7 | Refills: 0 | Status: DISCONTINUED | COMMUNITY
Start: 2018-12-05

## 2019-02-14 RX ORDER — POLYETHYLENE GLYCOL-3350, SODIUM CHLORIDE, POTASSIUM CHLORIDE AND SODIUM BICARBONATE 420; 11.2; 5.72; 1.48 G/438.4G; G/438.4G; G/438.4G; G/438.4G
420 POWDER, FOR SOLUTION ORAL
Qty: 4000 | Refills: 0 | Status: DISCONTINUED | COMMUNITY
Start: 2019-01-19

## 2019-02-14 NOTE — REVIEW OF SYSTEMS
[As Noted in HPI] : as noted in HPI [Negative] : Heme/Lymph [FreeTextEntry3] : Wears glasses [FreeTextEntry5] : HTN [FreeTextEntry7] : Ostomy in place [FreeTextEntry8] : h/o prostate cancer

## 2019-02-14 NOTE — REASON FOR VISIT
[Consultation] : a consultation visit [Family Member] : family member [FreeTextEntry1] : Patient intake forms reviewed

## 2019-02-14 NOTE — HISTORY OF PRESENT ILLNESS
[FreeTextEntry1] : Patient is a 72 yo male here to follow up after having emergent surgery for an obstructing mass.  he states that he went to the ER after about 6 days of having issues with bowel movements.  He became very bloated and distended.  He was given stuff in the ER to try and go and sent home.  He went back the next day because he felt something was wrong.  CT scan was done that showed an obstruction and he had emergent surgery.  Last colonoscopy was about 9 years ago.

## 2019-02-14 NOTE — CONSULT LETTER
[Dear  ___] : Dear  [unfilled], [Consult Letter:] : I had the pleasure of evaluating your patient, [unfilled]. [( Thank you for referring [unfilled] for consultation for _____ )] : Thank you for referring [unfilled] for consultation for [unfilled] [Please see my note below.] : Please see my note below. [Consult Closing:] : Thank you very much for allowing me to participate in the care of this patient.  If you have any questions, please do not hesitate to contact me. [Sincerely,] : Sincerely, [FreeTextEntry2] : Luis Varela [FreeTextEntry3] : Herve Jones MD FACS\par Chief Colon and Rectal Surgery\par [facility]

## 2019-02-14 NOTE — ASSESSMENT
[FreeTextEntry1] : Large bowel obstruction of unclear etiology\par -On table colonoscopy during surgery showed no obvious mass or lesion. It is unclear what is the etiology of colonic obstruction.\par -We'll schedule colonoscopy from colostomy, mucous fistula and rectum\par -Patient will ultimately require resection\par -We'll determine surgical plan after colonoscopy

## 2019-02-14 NOTE — PHYSICAL EXAM
[Normal Breath Sounds] : Normal breath sounds [Normal Heart Sounds] : normal heart sounds [No Rash or Lesion] : No rash or lesion [Alert] : alert [Oriented to Person] : oriented to person [Oriented to Place] : oriented to place [Oriented to Time] : oriented to time [Calm] : calm [de-identified] : stoma and mucous fistula noted [de-identified] : well nourished male [de-identified] : NC/AT [de-identified] : BRIONNA/+ROM [de-identified] : Intact

## 2019-02-26 ENCOUNTER — APPOINTMENT (OUTPATIENT)
Dept: SURGERY | Facility: CLINIC | Age: 74
End: 2019-02-26
Payer: MEDICARE

## 2019-02-26 VITALS
TEMPERATURE: 97.7 F | WEIGHT: 124 LBS | BODY MASS INDEX: 23.43 KG/M2 | OXYGEN SATURATION: 99 % | SYSTOLIC BLOOD PRESSURE: 155 MMHG | HEART RATE: 89 BPM | DIASTOLIC BLOOD PRESSURE: 82 MMHG

## 2019-02-26 PROCEDURE — 99024 POSTOP FOLLOW-UP VISIT: CPT

## 2019-03-07 ENCOUNTER — APPOINTMENT (OUTPATIENT)
Dept: COLORECTAL SURGERY | Facility: CLINIC | Age: 74
End: 2019-03-07
Payer: MEDICARE

## 2019-03-07 PROCEDURE — 44388 COLONOSCOPY THRU STOMA SPX: CPT

## 2019-03-07 PROCEDURE — 45330 DIAGNOSTIC SIGMOIDOSCOPY: CPT

## 2019-03-18 ENCOUNTER — FORM ENCOUNTER (OUTPATIENT)
Age: 74
End: 2019-03-18

## 2019-03-19 ENCOUNTER — OUTPATIENT (OUTPATIENT)
Dept: OUTPATIENT SERVICES | Facility: HOSPITAL | Age: 74
LOS: 1 days | End: 2019-03-19

## 2019-03-19 ENCOUNTER — APPOINTMENT (OUTPATIENT)
Dept: RADIOLOGY | Facility: HOSPITAL | Age: 74
End: 2019-03-19
Payer: MEDICARE

## 2019-03-19 DIAGNOSIS — Z90.5 ACQUIRED ABSENCE OF KIDNEY: Chronic | ICD-10-CM

## 2019-03-19 DIAGNOSIS — Z90.79 ACQUIRED ABSENCE OF OTHER GENITAL ORGAN(S): Chronic | ICD-10-CM

## 2019-03-19 DIAGNOSIS — K56.609 UNSPECIFIED INTESTINAL OBSTRUCTION, UNSPECIFIED AS TO PARTIAL VERSUS COMPLETE OBSTRUCTION: ICD-10-CM

## 2019-03-19 PROCEDURE — 74270 X-RAY XM COLON 1CNTRST STD: CPT | Mod: 26

## 2019-04-02 ENCOUNTER — APPOINTMENT (OUTPATIENT)
Dept: COLORECTAL SURGERY | Facility: CLINIC | Age: 74
End: 2019-04-02
Payer: MEDICARE

## 2019-04-02 PROCEDURE — 99213 OFFICE O/P EST LOW 20 MIN: CPT

## 2019-04-02 NOTE — ASSESSMENT
[FreeTextEntry1] : Colonic obstruction of unclear etiology\par -Colonoscopy showed no obvious mass or lesion however could not proceed past sigmoid colon.\par -Hypaque enema showed obstruction\par -I recommended patient undergo exploratory laparotomy with sigmoid colon resection. Patient may require descending colon resection for possible colostomy reversal with sigmoid colon resection. This will require exploration to decide. Risks and benefits were discussed at length including bleeding, infection, risk of injury to nearby structures, possible anastomotic dehiscence and possible diverting ileostomy creation\par -All questions were answered\par -Patient will schedule at his earliest convenience

## 2019-04-02 NOTE — HISTORY OF PRESENT ILLNESS
[FreeTextEntry1] : 74-year-old male status post exploratory laparotomy with end transverse colostomy and mucous fistula for obstructing sigmoid colon mass/stricture. Patient currently denies pain. No fevers or chills. Tolerating diet.

## 2019-04-22 ENCOUNTER — OUTPATIENT (OUTPATIENT)
Dept: OUTPATIENT SERVICES | Facility: HOSPITAL | Age: 74
LOS: 1 days | End: 2019-04-22
Payer: MEDICARE

## 2019-04-22 VITALS
HEIGHT: 62 IN | DIASTOLIC BLOOD PRESSURE: 82 MMHG | TEMPERATURE: 98 F | RESPIRATION RATE: 14 BRPM | WEIGHT: 126.1 LBS | HEART RATE: 67 BPM | SYSTOLIC BLOOD PRESSURE: 140 MMHG | OXYGEN SATURATION: 98 %

## 2019-04-22 DIAGNOSIS — K57.90 DIVERTICULOSIS OF INTESTINE, PART UNSPECIFIED, WITHOUT PERFORATION OR ABSCESS WITHOUT BLEEDING: ICD-10-CM

## 2019-04-22 DIAGNOSIS — K56.609 UNSPECIFIED INTESTINAL OBSTRUCTION, UNSPECIFIED AS TO PARTIAL VERSUS COMPLETE OBSTRUCTION: Chronic | ICD-10-CM

## 2019-04-22 DIAGNOSIS — Z90.79 ACQUIRED ABSENCE OF OTHER GENITAL ORGAN(S): Chronic | ICD-10-CM

## 2019-04-22 DIAGNOSIS — Z90.5 ACQUIRED ABSENCE OF KIDNEY: Chronic | ICD-10-CM

## 2019-04-22 LAB
ALBUMIN SERPL ELPH-MCNC: 4.4 G/DL — SIGNIFICANT CHANGE UP (ref 3.3–5)
ALP SERPL-CCNC: 112 U/L — SIGNIFICANT CHANGE UP (ref 40–120)
ALT FLD-CCNC: 36 U/L — SIGNIFICANT CHANGE UP (ref 4–41)
ANION GAP SERPL CALC-SCNC: 12 MMO/L — SIGNIFICANT CHANGE UP (ref 7–14)
AST SERPL-CCNC: 25 U/L — SIGNIFICANT CHANGE UP (ref 4–40)
BILIRUB SERPL-MCNC: 0.5 MG/DL — SIGNIFICANT CHANGE UP (ref 0.2–1.2)
BLD GP AB SCN SERPL QL: NEGATIVE — SIGNIFICANT CHANGE UP
BUN SERPL-MCNC: 13 MG/DL — SIGNIFICANT CHANGE UP (ref 7–23)
CALCIUM SERPL-MCNC: 10.3 MG/DL — SIGNIFICANT CHANGE UP (ref 8.4–10.5)
CHLORIDE SERPL-SCNC: 98 MMOL/L — SIGNIFICANT CHANGE UP (ref 98–107)
CO2 SERPL-SCNC: 26 MMOL/L — SIGNIFICANT CHANGE UP (ref 22–31)
CREAT SERPL-MCNC: 1.07 MG/DL — SIGNIFICANT CHANGE UP (ref 0.5–1.3)
GLUCOSE SERPL-MCNC: 85 MG/DL — SIGNIFICANT CHANGE UP (ref 70–99)
HBA1C BLD-MCNC: 6.8 % — HIGH (ref 4–5.6)
HCT VFR BLD CALC: 37.7 % — LOW (ref 39–50)
HGB BLD-MCNC: 12.1 G/DL — LOW (ref 13–17)
MCHC RBC-ENTMCNC: 29.5 PG — SIGNIFICANT CHANGE UP (ref 27–34)
MCHC RBC-ENTMCNC: 32.1 % — SIGNIFICANT CHANGE UP (ref 32–36)
MCV RBC AUTO: 92 FL — SIGNIFICANT CHANGE UP (ref 80–100)
NRBC # FLD: 0 K/UL — SIGNIFICANT CHANGE UP (ref 0–0)
PLATELET # BLD AUTO: 443 K/UL — HIGH (ref 150–400)
PMV BLD: 9.6 FL — SIGNIFICANT CHANGE UP (ref 7–13)
POTASSIUM SERPL-MCNC: 4.3 MMOL/L — SIGNIFICANT CHANGE UP (ref 3.5–5.3)
POTASSIUM SERPL-SCNC: 4.3 MMOL/L — SIGNIFICANT CHANGE UP (ref 3.5–5.3)
PROT SERPL-MCNC: 8.6 G/DL — HIGH (ref 6–8.3)
RBC # BLD: 4.1 M/UL — LOW (ref 4.2–5.8)
RBC # FLD: 12.1 % — SIGNIFICANT CHANGE UP (ref 10.3–14.5)
RH IG SCN BLD-IMP: POSITIVE — SIGNIFICANT CHANGE UP
SODIUM SERPL-SCNC: 136 MMOL/L — SIGNIFICANT CHANGE UP (ref 135–145)
WBC # BLD: 7.19 K/UL — SIGNIFICANT CHANGE UP (ref 3.8–10.5)
WBC # FLD AUTO: 7.19 K/UL — SIGNIFICANT CHANGE UP (ref 3.8–10.5)

## 2019-04-22 PROCEDURE — 93010 ELECTROCARDIOGRAM REPORT: CPT

## 2019-04-22 RX ORDER — SODIUM CHLORIDE 9 MG/ML
1000 INJECTION, SOLUTION INTRAVENOUS
Qty: 0 | Refills: 0 | Status: DISCONTINUED | OUTPATIENT
Start: 2019-05-03 | End: 2019-05-04

## 2019-04-22 RX ORDER — METFORMIN HYDROCHLORIDE 850 MG/1
0 TABLET ORAL
Qty: 0 | Refills: 0 | COMMUNITY

## 2019-04-22 NOTE — H&P PST ADULT - NSICDXPASTMEDICALHX_GEN_ALL_CORE_FT
PAST MEDICAL HISTORY:  Benign Essential Hypertension     Clinical Depression     Hyperlipidemia     Hyponatremia     Prostate Cancer     Renal Cancer PAST MEDICAL HISTORY:  Benign Essential Hypertension     Diabetes mellitus type 2    Diverticulosis     Hyperlipidemia     Hyponatremia     Prostate Cancer denies chemo and radiation    Renal Cancer denies chemo and radiation

## 2019-04-22 NOTE — H&P PST ADULT - NEGATIVE GENERAL GENITOURINARY SYMPTOMS
normal urinary frequency/no hematuria/no dysuria/no urinary hesitancy/no flank pain R/no bladder infections/no flank pain L

## 2019-04-22 NOTE — H&P PST ADULT - HISTORY OF PRESENT ILLNESS
75y/o male scheduled for open low anterior resection reversal of colostomy on 5/3/2019.  Pt states, "had bowel obstruction, s/p bowel resection and colostomy placement."

## 2019-04-22 NOTE — H&P PST ADULT - NEGATIVE GENERAL SYMPTOMS
no polyuria/no malaise/no fatigue/no weight loss/no weight gain/no polyphagia/no chills/no sweating/no anorexia/no polydipsia/no fever

## 2019-04-22 NOTE — H&P PST ADULT - GASTROINTESTINAL DETAILS
nontender/no guarding/no organomegaly/soft/no distention/no masses palpable/bowel sounds normal/no bruit/no rebound tenderness/no rigidity

## 2019-04-22 NOTE — H&P PST ADULT - MARITAL STATUS
/2 children, mother  98y/o, father  68y/o htn, 1 brother  kidney problems, 1 sister  ovarian problems

## 2019-04-22 NOTE — H&P PST ADULT - NSANTHOSAYNRD_GEN_A_CORE
No. JOSSELINE screening performed.  STOP BANG Legend: 0-2 = LOW Risk; 3-4 = INTERMEDIATE Risk; 5-8 = HIGH Risk

## 2019-04-22 NOTE — H&P PST ADULT - NSICDXPASTSURGICALHX_GEN_ALL_CORE_FT
social work/orthopedics/physical therapy PAST SURGICAL HISTORY:  H/O partial nephrectomy left    H/O prostatectomy PAST SURGICAL HISTORY:  Bowel obstruction colostomy placement 1/2019    H/O partial nephrectomy left, 2000- benign tumor    H/O prostatectomy 2001

## 2019-04-22 NOTE — H&P PST ADULT - NSICDXPROBLEM_GEN_ALL_CORE_FT
PROBLEM DIAGNOSES  Problem: Diverticulosis  Assessment and Plan: Pt scheduled for open low anterior resection reversal of colostomy on 5/3/2019.  labs done results pending, ekg done.  Preop teaching done, pt able to verbalize understanding.    DM pt instructed to PROBLEM DIAGNOSES  Problem: Diverticulosis  Assessment and Plan: Pt scheduled for open low anterior resection reversal of colostomy on 5/3/2019.  labs done results pending, ekg done.  Preop teaching done, pt able to verbalize understanding.    DM pt instructed to stop metformin 48 hrs prior to procedure, last dose 4/30/2019

## 2019-05-01 ENCOUNTER — MESSAGE (OUTPATIENT)
Age: 74
End: 2019-05-01

## 2019-05-02 ENCOUNTER — TRANSCRIPTION ENCOUNTER (OUTPATIENT)
Age: 74
End: 2019-05-02

## 2019-05-03 ENCOUNTER — INPATIENT (INPATIENT)
Facility: HOSPITAL | Age: 74
LOS: 2 days | Discharge: HOME HEALTH | End: 2019-05-06
Attending: STUDENT IN AN ORGANIZED HEALTH CARE EDUCATION/TRAINING PROGRAM | Admitting: STUDENT IN AN ORGANIZED HEALTH CARE EDUCATION/TRAINING PROGRAM
Payer: MEDICARE

## 2019-05-03 ENCOUNTER — RESULT REVIEW (OUTPATIENT)
Age: 74
End: 2019-05-03

## 2019-05-03 ENCOUNTER — APPOINTMENT (OUTPATIENT)
Dept: COLORECTAL SURGERY | Facility: HOSPITAL | Age: 74
End: 2019-05-03

## 2019-05-03 VITALS
HEART RATE: 77 BPM | SYSTOLIC BLOOD PRESSURE: 134 MMHG | OXYGEN SATURATION: 100 % | WEIGHT: 126.1 LBS | TEMPERATURE: 98 F | DIASTOLIC BLOOD PRESSURE: 71 MMHG | HEIGHT: 62 IN | RESPIRATION RATE: 16 BRPM

## 2019-05-03 DIAGNOSIS — K56.609 UNSPECIFIED INTESTINAL OBSTRUCTION, UNSPECIFIED AS TO PARTIAL VERSUS COMPLETE OBSTRUCTION: Chronic | ICD-10-CM

## 2019-05-03 DIAGNOSIS — Z90.5 ACQUIRED ABSENCE OF KIDNEY: Chronic | ICD-10-CM

## 2019-05-03 DIAGNOSIS — K57.90 DIVERTICULOSIS OF INTESTINE, PART UNSPECIFIED, WITHOUT PERFORATION OR ABSCESS WITHOUT BLEEDING: ICD-10-CM

## 2019-05-03 DIAGNOSIS — Z90.79 ACQUIRED ABSENCE OF OTHER GENITAL ORGAN(S): Chronic | ICD-10-CM

## 2019-05-03 LAB
GLUCOSE BLDC GLUCOMTR-MCNC: 109 MG/DL — HIGH (ref 70–99)
GLUCOSE BLDC GLUCOMTR-MCNC: 137 MG/DL — HIGH (ref 70–99)
GLUCOSE BLDC GLUCOMTR-MCNC: 160 MG/DL — HIGH (ref 70–99)
GLUCOSE BLDC GLUCOMTR-MCNC: 166 MG/DL — HIGH (ref 70–99)
GLUCOSE BLDC GLUCOMTR-MCNC: 205 MG/DL — HIGH (ref 70–99)
RH IG SCN BLD-IMP: POSITIVE — SIGNIFICANT CHANGE UP

## 2019-05-03 PROCEDURE — 88305 TISSUE EXAM BY PATHOLOGIST: CPT | Mod: 26

## 2019-05-03 PROCEDURE — 49000 EXPLORATION OF ABDOMEN: CPT

## 2019-05-03 PROCEDURE — 88341 IMHCHEM/IMCYTCHM EA ADD ANTB: CPT | Mod: 26

## 2019-05-03 PROCEDURE — 88342 IMHCHEM/IMCYTCHM 1ST ANTB: CPT | Mod: 26

## 2019-05-03 PROCEDURE — 88331 PATH CONSLTJ SURG 1 BLK 1SPC: CPT | Mod: 26

## 2019-05-03 RX ORDER — ATORVASTATIN CALCIUM 80 MG/1
40 TABLET, FILM COATED ORAL AT BEDTIME
Qty: 0 | Refills: 0 | Status: DISCONTINUED | OUTPATIENT
Start: 2019-05-03 | End: 2019-05-06

## 2019-05-03 RX ORDER — INSULIN LISPRO 100/ML
VIAL (ML) SUBCUTANEOUS
Qty: 0 | Refills: 0 | Status: DISCONTINUED | OUTPATIENT
Start: 2019-05-03 | End: 2019-05-06

## 2019-05-03 RX ORDER — HYDROMORPHONE HYDROCHLORIDE 2 MG/ML
0.5 INJECTION INTRAMUSCULAR; INTRAVENOUS; SUBCUTANEOUS
Qty: 0 | Refills: 0 | Status: DISCONTINUED | OUTPATIENT
Start: 2019-05-03 | End: 2019-05-03

## 2019-05-03 RX ORDER — SODIUM CHLORIDE 9 MG/ML
1000 INJECTION, SOLUTION INTRAVENOUS
Qty: 0 | Refills: 0 | Status: DISCONTINUED | OUTPATIENT
Start: 2019-05-03 | End: 2019-05-06

## 2019-05-03 RX ORDER — DEXTROSE 50 % IN WATER 50 %
12.5 SYRINGE (ML) INTRAVENOUS ONCE
Qty: 0 | Refills: 0 | Status: DISCONTINUED | OUTPATIENT
Start: 2019-05-03 | End: 2019-05-06

## 2019-05-03 RX ORDER — ACETAMINOPHEN 500 MG
1000 TABLET ORAL ONCE
Qty: 0 | Refills: 0 | Status: COMPLETED | OUTPATIENT
Start: 2019-05-03 | End: 2019-05-03

## 2019-05-03 RX ORDER — DEXTROSE 50 % IN WATER 50 %
25 SYRINGE (ML) INTRAVENOUS ONCE
Qty: 0 | Refills: 0 | Status: DISCONTINUED | OUTPATIENT
Start: 2019-05-03 | End: 2019-05-06

## 2019-05-03 RX ORDER — DEXTROSE 50 % IN WATER 50 %
15 SYRINGE (ML) INTRAVENOUS ONCE
Qty: 0 | Refills: 0 | Status: DISCONTINUED | OUTPATIENT
Start: 2019-05-03 | End: 2019-05-06

## 2019-05-03 RX ORDER — ONDANSETRON 8 MG/1
4 TABLET, FILM COATED ORAL EVERY 6 HOURS
Qty: 0 | Refills: 0 | Status: DISCONTINUED | OUTPATIENT
Start: 2019-05-03 | End: 2019-05-05

## 2019-05-03 RX ORDER — GLUCAGON INJECTION, SOLUTION 0.5 MG/.1ML
1 INJECTION, SOLUTION SUBCUTANEOUS ONCE
Qty: 0 | Refills: 0 | Status: DISCONTINUED | OUTPATIENT
Start: 2019-05-03 | End: 2019-05-06

## 2019-05-03 RX ORDER — SODIUM CHLORIDE 9 MG/ML
1000 INJECTION INTRAMUSCULAR; INTRAVENOUS; SUBCUTANEOUS
Qty: 0 | Refills: 0 | Status: DISCONTINUED | OUTPATIENT
Start: 2019-05-03 | End: 2019-05-03

## 2019-05-03 RX ORDER — NALOXONE HYDROCHLORIDE 4 MG/.1ML
0.1 SPRAY NASAL
Qty: 0 | Refills: 0 | Status: DISCONTINUED | OUTPATIENT
Start: 2019-05-03 | End: 2019-05-05

## 2019-05-03 RX ORDER — LISINOPRIL 2.5 MG/1
10 TABLET ORAL DAILY
Qty: 0 | Refills: 0 | Status: DISCONTINUED | OUTPATIENT
Start: 2019-05-03 | End: 2019-05-06

## 2019-05-03 RX ORDER — ENOXAPARIN SODIUM 100 MG/ML
40 INJECTION SUBCUTANEOUS EVERY 24 HOURS
Qty: 0 | Refills: 0 | Status: DISCONTINUED | OUTPATIENT
Start: 2019-05-03 | End: 2019-05-04

## 2019-05-03 RX ADMIN — SODIUM CHLORIDE 125 MILLILITER(S): 9 INJECTION, SOLUTION INTRAVENOUS at 20:50

## 2019-05-03 RX ADMIN — Medication 1: at 20:47

## 2019-05-03 RX ADMIN — Medication 400 MILLIGRAM(S): at 20:44

## 2019-05-03 RX ADMIN — SODIUM CHLORIDE 75 MILLILITER(S): 9 INJECTION INTRAMUSCULAR; INTRAVENOUS; SUBCUTANEOUS at 11:39

## 2019-05-03 RX ADMIN — ATORVASTATIN CALCIUM 40 MILLIGRAM(S): 80 TABLET, FILM COATED ORAL at 22:36

## 2019-05-03 NOTE — PROVIDER CONTACT NOTE (OTHER) - BACKGROUND
Pt. admitted for diverticulosis of intestine without perforation or abscess without bleeding; exploratory laparoscopic performed today. Pt has history of DM.

## 2019-05-03 NOTE — PROCEDURE NOTE - ADDITIONAL PROCEDURE DETAILS
pt prepped & draped in usual sterile fashion. 21Fr rigid cystoscope placed per urethra and advanced towards prostatic urethra where a stricture was noted. A super stiff wire was placed into the working port of the scope and advanced past the stricture which was noted to be approx 10Fr. The scope was removed leaving the wire in situ. Amplatz dilators were used to dilate the urethra up to 18Fr. We then placed a 16Fr Shepard catheter over the wire and advanced it the catheter to the hub. The wire was removed, the retention balloon was inflated, and the catheter was left to gravity drainage.

## 2019-05-03 NOTE — BRIEF OPERATIVE NOTE - OPERATION/FINDINGS
- Pt with firm pelvic mass like lesion but unable to determine origin.    - Multiple omental, peritoneal and liver implants.   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   - Adherent omentum to the anterior abdominal wall with associated loops of small bowel.   - Pt with urethral stricture, dilated by urology.  Shepard to stay until follow up as outpatient with urology.   - Good hemostasis at the end of the case.

## 2019-05-03 NOTE — BRIEF OPERATIVE NOTE - NSICDXBRIEFPROCEDURE_GEN_ALL_CORE_FT
PROCEDURES:  Lysis of peritoneal adhesions 03-May-2019 11:29:09  Mellissa Sosa  Peritoneal biopsy 03-May-2019 11:28:49  Mellissa Sosa  Omental biopsy 03-May-2019 11:28:41  Mellissa Sosa  Exploratory laparotomy 03-May-2019 11:28:26  Mellissa Sosa

## 2019-05-03 NOTE — PROCEDURE NOTE - NSICDXPROCEDURE_GEN_ALL_CORE_FT
PROCEDURES:  Cystoscopy 03-May-2019 09:13:00 with urethral dilation, Shepard catheter placement Glenn Larsen

## 2019-05-03 NOTE — CHART NOTE - NSCHARTNOTEFT_GEN_A_CORE
POST OPERATIVE NOTE  ATEAM SURGEY POST-OP  STATUS POST:  exlap, lysis of adhesions, biopsy of peritoneal lesions, uretheral dilitation and reyes placement by      SUBJECTIVE: The Pt tolerated the procedure well. Since leaving the OR he/she reports his pain is well controlled on PCEA. He denies any CP, SOB, N/V.    OBJECTIVE:  Vital Signs Last 24 Hrs  T(C): 36.4 (03 May 2019 14:59), Max: 36.6 (03 May 2019 06:11)  T(F): 97.5 (03 May 2019 14:59), Max: 97.8 (03 May 2019 06:11)  HR: 78 (03 May 2019 14:59) (71 - 80)  BP: 147/84 (03 May 2019 14:59) (127/75 - 147/84)  BP(mean): 91 (03 May 2019 14:00) (84 - 91)  RR: 16 (03 May 2019 14:59) (9 - 17)  SpO2: 99% (03 May 2019 14:59) (98% - 100%)      03 May 2019 07:01  -  03 May 2019 16:59  --------------------------------------------------------  IN:    sodium chloride 0.9%.: 225 mL  Total IN: 225 mL    OUT:    Indwelling Catheter - Urethral: 95 mL  Total OUT: 95 mL    Total NET: 130 mL    MEDICATIONS  (STANDING):  acetaminophen  IVPB .. 1000 milliGRAM(s) IV Intermittent once  acetaminophen  IVPB .. 1000 milliGRAM(s) IV Intermittent once  atorvastatin 40 milliGRAM(s) Oral at bedtime  dextrose 5%. 1000 milliLiter(s) (50 mL/Hr) IV Continuous <Continuous>  dextrose 50% Injectable 12.5 Gram(s) IV Push once  dextrose 50% Injectable 25 Gram(s) IV Push once  dextrose 50% Injectable 25 Gram(s) IV Push once  enoxaparin Injectable 40 milliGRAM(s) SubCutaneous every 24 hours  HYDROmorphone (10 MICROgram(s)/mL) + BUpivacaine 0.0625% in 0.9% Sodium Chloride PCEA 250 milliLiter(s) Epidural PCA Continuous  insulin lispro (HumaLOG) corrective regimen sliding scale   SubCutaneous three times a day before meals  lactated ringers. 1000 milliLiter(s) (30 mL/Hr) IV Continuous <Continuous>  lisinopril 10 milliGRAM(s) Oral daily  sodium chloride 0.9%. 1000 milliLiter(s) (75 mL/Hr) IV Continuous <Continuous>    MEDICATIONS  (PRN):  dextrose 40% Gel 15 Gram(s) Oral once PRN Blood Glucose LESS THAN 70 milliGRAM(s)/deciliter  glucagon  Injectable 1 milliGRAM(s) IntraMuscular once PRN Glucose LESS THAN 70 milligrams/deciliter  HYDROmorphone (10 MICROgram(s)/mL) + BUpivacaine 0.0625% in 0.9% Sodium Chloride PCEA Rescue Clinician  Bolus 5 milliLiter(s) Epidural every 15 minutes PRN for Pain Score greater than 6  naloxone Injectable 0.1 milliGRAM(s) IV Push every 3 minutes PRN For ANY of the following changes in patient status:  A. RR LESS THAN 10 breaths per minute, B. Oxygen saturation LESS THAN 90%, C. Sedation score of 6  ondansetron Injectable 4 milliGRAM(s) IV Push every 6 hours PRN Nausea    PHYSICAL EXAM:  GEN: NAD, AOx3  ABD: Soft, ND, appropriately tender, midline surgical site dressing has minimal serousanguinous spotting at inferior dressing,   EXTREMITIES: neg edema    A/P: 74y Male s/p exlap, lysis of adhesions, biopsy of peritoneal lesions, uretheral dilitation and reyes placement by    - Pain control with PCEA  - DVT ppx  - f/u a.m. labs  - OOB  - *** Diet POST OPERATIVE NOTE  ATEAM SURGEY POST-OP  STATUS POST:  exlap, lysis of adhesions, biopsy of peritoneal lesions, uretheral dilitation and reyes placement by      SUBJECTIVE: The Pt tolerated the procedure well. Since leaving the OR he/she reports his pain is well controlled on PCEA. He denies any CP, SOB, N/V.    OBJECTIVE:  Vital Signs Last 24 Hrs  T(C): 36.4 (03 May 2019 14:59), Max: 36.6 (03 May 2019 06:11)  T(F): 97.5 (03 May 2019 14:59), Max: 97.8 (03 May 2019 06:11)  HR: 78 (03 May 2019 14:59) (71 - 80)  BP: 147/84 (03 May 2019 14:59) (127/75 - 147/84)  BP(mean): 91 (03 May 2019 14:00) (84 - 91)  RR: 16 (03 May 2019 14:59) (9 - 17)  SpO2: 99% (03 May 2019 14:59) (98% - 100%)      03 May 2019 07:01  -  03 May 2019 16:59  --------------------------------------------------------  IN:    sodium chloride 0.9%.: 225 mL  Total IN: 225 mL    OUT:    Indwelling Catheter - Urethral: 95 mL  Total OUT: 95 mL    Total NET: 130 mL    MEDICATIONS  (STANDING):  acetaminophen  IVPB .. 1000 milliGRAM(s) IV Intermittent once  acetaminophen  IVPB .. 1000 milliGRAM(s) IV Intermittent once  atorvastatin 40 milliGRAM(s) Oral at bedtime  dextrose 5%. 1000 milliLiter(s) (50 mL/Hr) IV Continuous <Continuous>  dextrose 50% Injectable 12.5 Gram(s) IV Push once  dextrose 50% Injectable 25 Gram(s) IV Push once  dextrose 50% Injectable 25 Gram(s) IV Push once  enoxaparin Injectable 40 milliGRAM(s) SubCutaneous every 24 hours  HYDROmorphone (10 MICROgram(s)/mL) + BUpivacaine 0.0625% in 0.9% Sodium Chloride PCEA 250 milliLiter(s) Epidural PCA Continuous  insulin lispro (HumaLOG) corrective regimen sliding scale   SubCutaneous three times a day before meals  lactated ringers. 1000 milliLiter(s) (30 mL/Hr) IV Continuous <Continuous>  lisinopril 10 milliGRAM(s) Oral daily  sodium chloride 0.9%. 1000 milliLiter(s) (75 mL/Hr) IV Continuous <Continuous>    MEDICATIONS  (PRN):  dextrose 40% Gel 15 Gram(s) Oral once PRN Blood Glucose LESS THAN 70 milliGRAM(s)/deciliter  glucagon  Injectable 1 milliGRAM(s) IntraMuscular once PRN Glucose LESS THAN 70 milligrams/deciliter  HYDROmorphone (10 MICROgram(s)/mL) + BUpivacaine 0.0625% in 0.9% Sodium Chloride PCEA Rescue Clinician  Bolus 5 milliLiter(s) Epidural every 15 minutes PRN for Pain Score greater than 6  naloxone Injectable 0.1 milliGRAM(s) IV Push every 3 minutes PRN For ANY of the following changes in patient status:  A. RR LESS THAN 10 breaths per minute, B. Oxygen saturation LESS THAN 90%, C. Sedation score of 6  ondansetron Injectable 4 milliGRAM(s) IV Push every 6 hours PRN Nausea    PHYSICAL EXAM:  GEN: NAD, AOx3  ABD: Soft, ND, appropriately tender, midline surgical site dressing has minimal serousanguinous spotting at inferior dressing,   EXTREMITIES: neg edema    A/P: 74y Male s/p exlap, lysis of adhesions, biopsy of peritoneal lesions, uretheral dilitation and reyes placement by    - Pain control with PCEA  - DVT ppx, hold lovenox tomorrow in case of epidural removal  - f/u a.m. labs including coags  - OOB  - Clear liquid Diet  - continue reyes until outpatient follow up with FRAN Gann [id number]  A team surgery [id number]

## 2019-05-03 NOTE — CHART NOTE - NSCHARTNOTEFT_GEN_A_CORE
Called by colorectal surgery team for urgent intraoperative consult. Pt has hx sig for radical prostatectomy in the past. The surgical team attempted catheter placement with coude x2. See procedure note for details.    Plan  - Leave catheter for at least 1 week  - if he is in house beyond that time, then may consider trial of void  - perioperative abx per primary team  - may follow up with his own urologist or Dr. Ricardo Hdez

## 2019-05-04 LAB
ANION GAP SERPL CALC-SCNC: 13 MMO/L — SIGNIFICANT CHANGE UP (ref 7–14)
APTT BLD: 28.3 SEC — SIGNIFICANT CHANGE UP (ref 27.5–36.3)
BASOPHILS # BLD AUTO: 0.03 K/UL — SIGNIFICANT CHANGE UP (ref 0–0.2)
BASOPHILS NFR BLD AUTO: 0.2 % — SIGNIFICANT CHANGE UP (ref 0–2)
BUN SERPL-MCNC: 16 MG/DL — SIGNIFICANT CHANGE UP (ref 7–23)
CALCIUM SERPL-MCNC: 9.2 MG/DL — SIGNIFICANT CHANGE UP (ref 8.4–10.5)
CHLORIDE SERPL-SCNC: 99 MMOL/L — SIGNIFICANT CHANGE UP (ref 98–107)
CO2 SERPL-SCNC: 23 MMOL/L — SIGNIFICANT CHANGE UP (ref 22–31)
CREAT SERPL-MCNC: 1.07 MG/DL — SIGNIFICANT CHANGE UP (ref 0.5–1.3)
EOSINOPHIL # BLD AUTO: 0 K/UL — SIGNIFICANT CHANGE UP (ref 0–0.5)
EOSINOPHIL NFR BLD AUTO: 0 % — SIGNIFICANT CHANGE UP (ref 0–6)
GLUCOSE BLDC GLUCOMTR-MCNC: 105 MG/DL — HIGH (ref 70–99)
GLUCOSE BLDC GLUCOMTR-MCNC: 112 MG/DL — HIGH (ref 70–99)
GLUCOSE BLDC GLUCOMTR-MCNC: 123 MG/DL — HIGH (ref 70–99)
GLUCOSE BLDC GLUCOMTR-MCNC: 137 MG/DL — HIGH (ref 70–99)
GLUCOSE SERPL-MCNC: 124 MG/DL — HIGH (ref 70–99)
HCT VFR BLD CALC: 36.3 % — LOW (ref 39–50)
HGB BLD-MCNC: 11.7 G/DL — LOW (ref 13–17)
IMM GRANULOCYTES NFR BLD AUTO: 0.6 % — SIGNIFICANT CHANGE UP (ref 0–1.5)
INR BLD: 1.17 — SIGNIFICANT CHANGE UP (ref 0.88–1.17)
LYMPHOCYTES # BLD AUTO: 1.39 K/UL — SIGNIFICANT CHANGE UP (ref 1–3.3)
LYMPHOCYTES # BLD AUTO: 11.2 % — LOW (ref 13–44)
MCHC RBC-ENTMCNC: 29.4 PG — SIGNIFICANT CHANGE UP (ref 27–34)
MCHC RBC-ENTMCNC: 32.2 % — SIGNIFICANT CHANGE UP (ref 32–36)
MCV RBC AUTO: 91.2 FL — SIGNIFICANT CHANGE UP (ref 80–100)
MONOCYTES # BLD AUTO: 0.84 K/UL — SIGNIFICANT CHANGE UP (ref 0–0.9)
MONOCYTES NFR BLD AUTO: 6.8 % — SIGNIFICANT CHANGE UP (ref 2–14)
NEUTROPHILS # BLD AUTO: 10.06 K/UL — HIGH (ref 1.8–7.4)
NEUTROPHILS NFR BLD AUTO: 81.2 % — HIGH (ref 43–77)
NRBC # FLD: 0 K/UL — SIGNIFICANT CHANGE UP (ref 0–0)
PLATELET # BLD AUTO: 441 K/UL — HIGH (ref 150–400)
PMV BLD: 9.1 FL — SIGNIFICANT CHANGE UP (ref 7–13)
POTASSIUM SERPL-MCNC: 4.7 MMOL/L — SIGNIFICANT CHANGE UP (ref 3.5–5.3)
POTASSIUM SERPL-SCNC: 4.7 MMOL/L — SIGNIFICANT CHANGE UP (ref 3.5–5.3)
PROTHROM AB SERPL-ACNC: 13 SEC — SIGNIFICANT CHANGE UP (ref 9.8–13.1)
RBC # BLD: 3.98 M/UL — LOW (ref 4.2–5.8)
RBC # FLD: 12.2 % — SIGNIFICANT CHANGE UP (ref 10.3–14.5)
SODIUM SERPL-SCNC: 135 MMOL/L — SIGNIFICANT CHANGE UP (ref 135–145)
WBC # BLD: 12.39 K/UL — HIGH (ref 3.8–10.5)
WBC # FLD AUTO: 12.39 K/UL — HIGH (ref 3.8–10.5)

## 2019-05-04 RX ORDER — DEXTROSE MONOHYDRATE, SODIUM CHLORIDE, AND POTASSIUM CHLORIDE 50; .745; 4.5 G/1000ML; G/1000ML; G/1000ML
1000 INJECTION, SOLUTION INTRAVENOUS
Qty: 0 | Refills: 0 | Status: DISCONTINUED | OUTPATIENT
Start: 2019-05-04 | End: 2019-05-05

## 2019-05-04 RX ORDER — HEPARIN SODIUM 5000 [USP'U]/ML
5000 INJECTION INTRAVENOUS; SUBCUTANEOUS EVERY 8 HOURS
Qty: 0 | Refills: 0 | Status: DISCONTINUED | OUTPATIENT
Start: 2019-05-04 | End: 2019-05-05

## 2019-05-04 RX ADMIN — ATORVASTATIN CALCIUM 40 MILLIGRAM(S): 80 TABLET, FILM COATED ORAL at 21:10

## 2019-05-04 RX ADMIN — DEXTROSE MONOHYDRATE, SODIUM CHLORIDE, AND POTASSIUM CHLORIDE 75 MILLILITER(S): 50; .745; 4.5 INJECTION, SOLUTION INTRAVENOUS at 14:25

## 2019-05-04 RX ADMIN — SODIUM CHLORIDE 125 MILLILITER(S): 9 INJECTION, SOLUTION INTRAVENOUS at 10:18

## 2019-05-04 RX ADMIN — LISINOPRIL 10 MILLIGRAM(S): 2.5 TABLET ORAL at 05:22

## 2019-05-04 NOTE — PROVIDER CONTACT NOTE (OTHER) - ASSESSMENT
Pt having numbness on R lower leg, Pt able to move leg up and down. Having coolness and numbness noted.

## 2019-05-04 NOTE — PROGRESS NOTE ADULT - SUBJECTIVE AND OBJECTIVE BOX
Anesthesia Pain Management Service: Day __ of Epidural    SUBJECTIVE: Patient doing well with PCEA and no problems.  Pain Scale Score:   Refer to charted pain scores    THERAPY:  [x ] Epidural Bupivacaine 0.0625% and Hydromorphone  		[ X] 10 micrograms/mL	[ ] 5 micrograms/mL  [ ] Epidural Bupivacaine 0.0625% and Fentanyl - 2 micrograms/mL  [ ] Epidural Ropivacaine 0.1% plain – 1 mg/mL  [ ] Patient Controlled Regional Anesthesia (PCRA) Ropivacaine  		[ ] 0.2%			[ ] 0.1%    Demand dose __3_ lockout __15_ (minutes) Continuous Rate __6_ Total: _167___ ml used (in past 24 hours)      MEDICATIONS  (STANDING):  atorvastatin 40 milliGRAM(s) Oral at bedtime  dextrose 5% + sodium chloride 0.45% with potassium chloride 20 mEq/L 1000 milliLiter(s) (75 mL/Hr) IV Continuous <Continuous>  dextrose 5%. 1000 milliLiter(s) (50 mL/Hr) IV Continuous <Continuous>  dextrose 50% Injectable 12.5 Gram(s) IV Push once  dextrose 50% Injectable 25 Gram(s) IV Push once  dextrose 50% Injectable 25 Gram(s) IV Push once  HYDROmorphone (10 MICROgram(s)/mL) + BUpivacaine 0.0625% in 0.9% Sodium Chloride PCEA 250 milliLiter(s) Epidural PCA Continuous  insulin lispro (HumaLOG) corrective regimen sliding scale   SubCutaneous three times a day before meals  lisinopril 10 milliGRAM(s) Oral daily    MEDICATIONS  (PRN):  dextrose 40% Gel 15 Gram(s) Oral once PRN Blood Glucose LESS THAN 70 milliGRAM(s)/deciliter  glucagon  Injectable 1 milliGRAM(s) IntraMuscular once PRN Glucose LESS THAN 70 milligrams/deciliter  HYDROmorphone (10 MICROgram(s)/mL) + BUpivacaine 0.0625% in 0.9% Sodium Chloride PCEA Rescue Clinician  Bolus 5 milliLiter(s) Epidural every 15 minutes PRN for Pain Score greater than 6  naloxone Injectable 0.1 milliGRAM(s) IV Push every 3 minutes PRN For ANY of the following changes in patient status:  A. RR LESS THAN 10 breaths per minute, B. Oxygen saturation LESS THAN 90%, C. Sedation score of 6  ondansetron Injectable 4 milliGRAM(s) IV Push every 6 hours PRN Nausea      OBJECTIVE:    Assessment of Catheter Site:	[ ] Left	[ ] Right  [x ] Epidural 	[ ] Femoral	      [ ] Saphenous   [ ] Supraclavicular   [ ] Other:    [x ] Dressing intact	[x ] Site non-tender	[ x] Site without erythema, discharge, edema  [x ] Epidural tubing and connection checked	[x] Gross neurological exam within normal limits  [ ] Catheter removed – tip intact		[ ] Afebrile  	[ ] Febrile: ___   [ X] see Temp under VS below)    PT/INR - ( 04 May 2019 06:11 )   PT: 13.0 SEC;   INR: 1.17          PTT - ( 04 May 2019 06:11 )  PTT:28.3 SEC                      11.7   12.39 )-----------( 441      ( 04 May 2019 06:11 )             36.3     Vital Signs Last 24 Hrs  T(C): 36.8 (05-04-19 @ 13:47), Max: 37.1 (05-04-19 @ 10:41)  T(F): 98.3 (05-04-19 @ 13:47), Max: 98.7 (05-04-19 @ 10:41)  HR: 78 (05-04-19 @ 13:47) (73 - 80)  BP: 132/78 (05-04-19 @ 13:47) (128/74 - 149/85)  BP(mean): --  RR: 16 (05-04-19 @ 13:47) (16 - 18)  SpO2: 99% (05-04-19 @ 13:47) (97% - 99%)      Sedation Score:	[x ] Alert	[ ] Drowsy	[ ] Arousable	[ ] Asleep	[ ] Unresponsive    Side Effects:	[x ] None	[ ] Nausea	[ ] Vomiting	[ ] Pruritus  		[ ] Weakness		[ ] Numbness	[ ] Other:    ASSESSMENT/ PLAN:    Therapy to  be:	[x ] Continue   [ ] Discontinued   [ ] Change to prn Analgesics    Documentation and Verification of current medications:  [ X ] Done	[ ] Not done, not eligible, reason:    Comments: Doing OK with epidural and may continue. Consid stop tomorrow.    Progress Note written now but Patient was seen earlier.

## 2019-05-04 NOTE — PROGRESS NOTE ADULT - ASSESSMENT
4y Male s/p exlap, lysis of adhesions, biopsy of peritoneal lesions, uretheral dilitation and reyes placement by  POD1.     - Pain control with PCEA, daily coags  - DVT ppx, hold lovenox tomorrow in case of epidural removal  - f/u a.m. labs including coags  - OOB  - Clear liquid Diet for now  - continue reyes until outpatient follow up with     [facility] [id number]

## 2019-05-04 NOTE — PHYSICAL THERAPY INITIAL EVALUATION ADULT - PLANNED THERAPY INTERVENTIONS, PT EVAL
strengthening/Patient left sitting in chair, in NAD, call bell in reach, all lines intact./balance training/bed mobility training/gait training/transfer training

## 2019-05-04 NOTE — PHYSICAL THERAPY INITIAL EVALUATION ADULT - GENERAL OBSERVATIONS, REHAB EVAL
Patient found sitting in chair in NAD; +PCEA pump; +reyes; daughter in law at bedside; reports right thigh numbness that has been improving.

## 2019-05-04 NOTE — PROGRESS NOTE ADULT - SUBJECTIVE AND OBJECTIVE BOX
GENERAL SURGERY DAILY PROGRESS NOTE:     Subjective:  Pt seen and examined. No acute events overnight. Pain well controlled w/ PCEA. No flatus or stool in ostomy. Denies n/v. Denies cp/sob.     Objective:    MEDICATIONS  (STANDING):  atorvastatin 40 milliGRAM(s) Oral at bedtime  dextrose 5%. 1000 milliLiter(s) (50 mL/Hr) IV Continuous <Continuous>  dextrose 50% Injectable 12.5 Gram(s) IV Push once  dextrose 50% Injectable 25 Gram(s) IV Push once  dextrose 50% Injectable 25 Gram(s) IV Push once  enoxaparin Injectable 40 milliGRAM(s) SubCutaneous every 24 hours  HYDROmorphone (10 MICROgram(s)/mL) + BUpivacaine 0.0625% in 0.9% Sodium Chloride PCEA 250 milliLiter(s) Epidural PCA Continuous  insulin lispro (HumaLOG) corrective regimen sliding scale   SubCutaneous three times a day before meals  lactated ringers. 1000 milliLiter(s) (125 mL/Hr) IV Continuous <Continuous>  lisinopril 10 milliGRAM(s) Oral daily    MEDICATIONS  (PRN):  dextrose 40% Gel 15 Gram(s) Oral once PRN Blood Glucose LESS THAN 70 milliGRAM(s)/deciliter  glucagon  Injectable 1 milliGRAM(s) IntraMuscular once PRN Glucose LESS THAN 70 milligrams/deciliter  HYDROmorphone (10 MICROgram(s)/mL) + BUpivacaine 0.0625% in 0.9% Sodium Chloride PCEA Rescue Clinician  Bolus 5 milliLiter(s) Epidural every 15 minutes PRN for Pain Score greater than 6  naloxone Injectable 0.1 milliGRAM(s) IV Push every 3 minutes PRN For ANY of the following changes in patient status:  A. RR LESS THAN 10 breaths per minute, B. Oxygen saturation LESS THAN 90%, C. Sedation score of 6  ondansetron Injectable 4 milliGRAM(s) IV Push every 6 hours PRN Nausea      Vital Signs Last 24 Hrs  T(C): 36.8 (04 May 2019 05:17), Max: 36.8 (03 May 2019 21:59)  T(F): 98.2 (04 May 2019 05:17), Max: 98.3 (03 May 2019 21:59)  HR: 77 (04 May 2019 05:17) (71 - 80)  BP: 132/88 (04 May 2019 05:17) (127/75 - 147/84)  BP(mean): 91 (03 May 2019 14:00) (84 - 91)  RR: 17 (04 May 2019 05:17) (9 - 18)  SpO2: 98% (04 May 2019 05:17) (97% - 100%)    I&O's Detail    03 May 2019 07:01  -  04 May 2019 07:00  --------------------------------------------------------  IN:    lactated ringers.: 1000 mL    sodium chloride 0.9%: 725 mL  Total IN: 1725 mL    OUT:    Indwelling Catheter - Urethral: 1495 mL  Total OUT: 1495 mL    Total NET: 230 mL          Daily     Daily     LABS:                        11.7   12.39 )-----------( 441      ( 04 May 2019 06:11 )             36.3     05-04    135  |  99  |  16  ----------------------------<  124<H>  4.7   |  23  |  1.07    Ca    9.2      04 May 2019 06:11      PT/INR - ( 04 May 2019 06:11 )   PT: 13.0 SEC;   INR: 1.17          PTT - ( 04 May 2019 06:11 )  PTT:28.3 SEC      RADIOLOGY & ADDITIONAL STUDIES:      PHYSICAL EXAM  GEN: NAD, AOx3  RESP: breathing comfortably  ABD: Soft, ND, appropriately tender, midline surgical site dressing has minimal serousanguinous spotting at inferior dressing, ostomy w/o stool/air  EXTREMITIES: neg edema

## 2019-05-05 LAB
ANION GAP SERPL CALC-SCNC: 11 MMO/L — SIGNIFICANT CHANGE UP (ref 7–14)
APTT BLD: 28.4 SEC — SIGNIFICANT CHANGE UP (ref 27.5–36.3)
BUN SERPL-MCNC: 11 MG/DL — SIGNIFICANT CHANGE UP (ref 7–23)
CALCIUM SERPL-MCNC: 8.9 MG/DL — SIGNIFICANT CHANGE UP (ref 8.4–10.5)
CHLORIDE SERPL-SCNC: 96 MMOL/L — LOW (ref 98–107)
CO2 SERPL-SCNC: 26 MMOL/L — SIGNIFICANT CHANGE UP (ref 22–31)
CREAT SERPL-MCNC: 0.89 MG/DL — SIGNIFICANT CHANGE UP (ref 0.5–1.3)
GLUCOSE BLDC GLUCOMTR-MCNC: 110 MG/DL — HIGH (ref 70–99)
GLUCOSE BLDC GLUCOMTR-MCNC: 111 MG/DL — HIGH (ref 70–99)
GLUCOSE BLDC GLUCOMTR-MCNC: 121 MG/DL — HIGH (ref 70–99)
GLUCOSE BLDC GLUCOMTR-MCNC: 153 MG/DL — HIGH (ref 70–99)
GLUCOSE SERPL-MCNC: 135 MG/DL — HIGH (ref 70–99)
HCT VFR BLD CALC: 35.2 % — LOW (ref 39–50)
HGB BLD-MCNC: 11.2 G/DL — LOW (ref 13–17)
INR BLD: 1.14 — SIGNIFICANT CHANGE UP (ref 0.88–1.17)
MAGNESIUM SERPL-MCNC: 1.7 MG/DL — SIGNIFICANT CHANGE UP (ref 1.6–2.6)
MCHC RBC-ENTMCNC: 29.2 PG — SIGNIFICANT CHANGE UP (ref 27–34)
MCHC RBC-ENTMCNC: 31.8 % — LOW (ref 32–36)
MCV RBC AUTO: 91.7 FL — SIGNIFICANT CHANGE UP (ref 80–100)
NRBC # FLD: 0 K/UL — SIGNIFICANT CHANGE UP (ref 0–0)
PHOSPHATE SERPL-MCNC: 1.9 MG/DL — LOW (ref 2.5–4.5)
PLATELET # BLD AUTO: 390 K/UL — SIGNIFICANT CHANGE UP (ref 150–400)
PMV BLD: 9.2 FL — SIGNIFICANT CHANGE UP (ref 7–13)
POTASSIUM SERPL-MCNC: 4.3 MMOL/L — SIGNIFICANT CHANGE UP (ref 3.5–5.3)
POTASSIUM SERPL-SCNC: 4.3 MMOL/L — SIGNIFICANT CHANGE UP (ref 3.5–5.3)
PROTHROM AB SERPL-ACNC: 13 SEC — SIGNIFICANT CHANGE UP (ref 9.8–13.1)
RBC # BLD: 3.84 M/UL — LOW (ref 4.2–5.8)
RBC # FLD: 12.5 % — SIGNIFICANT CHANGE UP (ref 10.3–14.5)
SODIUM SERPL-SCNC: 133 MMOL/L — LOW (ref 135–145)
WBC # BLD: 10.09 K/UL — SIGNIFICANT CHANGE UP (ref 3.8–10.5)
WBC # FLD AUTO: 10.09 K/UL — SIGNIFICANT CHANGE UP (ref 3.8–10.5)

## 2019-05-05 RX ORDER — OXYCODONE HYDROCHLORIDE 5 MG/1
5 TABLET ORAL
Qty: 0 | Refills: 0 | Status: DISCONTINUED | OUTPATIENT
Start: 2019-05-05 | End: 2019-05-06

## 2019-05-05 RX ORDER — ACETAMINOPHEN 500 MG
650 TABLET ORAL EVERY 6 HOURS
Qty: 0 | Refills: 0 | Status: DISCONTINUED | OUTPATIENT
Start: 2019-05-05 | End: 2019-05-06

## 2019-05-05 RX ORDER — HYDROMORPHONE HYDROCHLORIDE 2 MG/ML
0.5 INJECTION INTRAMUSCULAR; INTRAVENOUS; SUBCUTANEOUS
Qty: 0 | Refills: 0 | Status: DISCONTINUED | OUTPATIENT
Start: 2019-05-05 | End: 2019-05-06

## 2019-05-05 RX ORDER — POTASSIUM PHOSPHATE, MONOBASIC POTASSIUM PHOSPHATE, DIBASIC 236; 224 MG/ML; MG/ML
30 INJECTION, SOLUTION INTRAVENOUS ONCE
Qty: 0 | Refills: 0 | Status: COMPLETED | OUTPATIENT
Start: 2019-05-05 | End: 2019-05-05

## 2019-05-05 RX ORDER — OXYCODONE HYDROCHLORIDE 5 MG/1
10 TABLET ORAL
Qty: 0 | Refills: 0 | Status: DISCONTINUED | OUTPATIENT
Start: 2019-05-05 | End: 2019-05-06

## 2019-05-05 RX ORDER — SODIUM,POTASSIUM PHOSPHATES 278-250MG
1 POWDER IN PACKET (EA) ORAL ONCE
Qty: 0 | Refills: 0 | Status: COMPLETED | OUTPATIENT
Start: 2019-05-05 | End: 2019-05-05

## 2019-05-05 RX ORDER — HEPARIN SODIUM 5000 [USP'U]/ML
5000 INJECTION INTRAVENOUS; SUBCUTANEOUS EVERY 8 HOURS
Qty: 0 | Refills: 0 | Status: DISCONTINUED | OUTPATIENT
Start: 2019-05-05 | End: 2019-05-06

## 2019-05-05 RX ORDER — MAGNESIUM SULFATE 500 MG/ML
2 VIAL (ML) INJECTION ONCE
Qty: 0 | Refills: 0 | Status: COMPLETED | OUTPATIENT
Start: 2019-05-05 | End: 2019-05-05

## 2019-05-05 RX ADMIN — Medication 650 MILLIGRAM(S): at 18:18

## 2019-05-05 RX ADMIN — LISINOPRIL 10 MILLIGRAM(S): 2.5 TABLET ORAL at 05:52

## 2019-05-05 RX ADMIN — Medication 1 PACKET(S): at 16:46

## 2019-05-05 RX ADMIN — Medication 650 MILLIGRAM(S): at 17:48

## 2019-05-05 RX ADMIN — POTASSIUM PHOSPHATE, MONOBASIC POTASSIUM PHOSPHATE, DIBASIC 83.33 MILLIMOLE(S): 236; 224 INJECTION, SOLUTION INTRAVENOUS at 13:11

## 2019-05-05 RX ADMIN — HEPARIN SODIUM 5000 UNIT(S): 5000 INJECTION INTRAVENOUS; SUBCUTANEOUS at 23:08

## 2019-05-05 RX ADMIN — Medication 1: at 12:57

## 2019-05-05 RX ADMIN — Medication 50 GRAM(S): at 10:01

## 2019-05-05 RX ADMIN — HEPARIN SODIUM 5000 UNIT(S): 5000 INJECTION INTRAVENOUS; SUBCUTANEOUS at 16:39

## 2019-05-05 RX ADMIN — ATORVASTATIN CALCIUM 40 MILLIGRAM(S): 80 TABLET, FILM COATED ORAL at 21:08

## 2019-05-05 NOTE — PROGRESS NOTE ADULT - SUBJECTIVE AND OBJECTIVE BOX
GENERAL SURGERY DAILY PROGRESS NOTE:     Subjective:  Pt seen and examined. No acute events overnight. Pain well controlled. Tolerating diet. Denies n/v. Air in ostomy bag.     Objective:    MEDICATIONS  (STANDING):  atorvastatin 40 milliGRAM(s) Oral at bedtime  dextrose 5%. 1000 milliLiter(s) (50 mL/Hr) IV Continuous <Continuous>  dextrose 50% Injectable 12.5 Gram(s) IV Push once  dextrose 50% Injectable 25 Gram(s) IV Push once  dextrose 50% Injectable 25 Gram(s) IV Push once  heparin  Injectable 5000 Unit(s) SubCutaneous every 8 hours  HYDROmorphone (10 MICROgram(s)/mL) + BUpivacaine 0.0625% in 0.9% Sodium Chloride PCEA 250 milliLiter(s) Epidural PCA Continuous  insulin lispro (HumaLOG) corrective regimen sliding scale   SubCutaneous three times a day before meals  lisinopril 10 milliGRAM(s) Oral daily  magnesium sulfate  IVPB 2 Gram(s) IV Intermittent once  potassium phosphate IVPB 30 milliMole(s) IV Intermittent once    MEDICATIONS  (PRN):  dextrose 40% Gel 15 Gram(s) Oral once PRN Blood Glucose LESS THAN 70 milliGRAM(s)/deciliter  glucagon  Injectable 1 milliGRAM(s) IntraMuscular once PRN Glucose LESS THAN 70 milligrams/deciliter  HYDROmorphone (10 MICROgram(s)/mL) + BUpivacaine 0.0625% in 0.9% Sodium Chloride PCEA Rescue Clinician  Bolus 5 milliLiter(s) Epidural every 15 minutes PRN for Pain Score greater than 6  naloxone Injectable 0.1 milliGRAM(s) IV Push every 3 minutes PRN For ANY of the following changes in patient status:  A. RR LESS THAN 10 breaths per minute, B. Oxygen saturation LESS THAN 90%, C. Sedation score of 6  ondansetron Injectable 4 milliGRAM(s) IV Push every 6 hours PRN Nausea      Vital Signs Last 24 Hrs  T(C): 37.2 (05 May 2019 05:51), Max: 37.2 (04 May 2019 22:06)  T(F): 98.9 (05 May 2019 05:51), Max: 98.9 (04 May 2019 22:06)  HR: 78 (05 May 2019 05:51) (74 - 80)  BP: 150/90 (05 May 2019 05:51) (132/78 - 154/94)  BP(mean): --  RR: 18 (05 May 2019 05:51) (16 - 18)  SpO2: 99% (05 May 2019 05:51) (98% - 99%)    I&O's Detail    04 May 2019 07:01  -  05 May 2019 07:00  --------------------------------------------------------  IN:    dextrose 5% + sodium chloride 0.45% with potassium chloride 20 mEq/L: 975 mL    lactated ringers.: 750 mL  Total IN: 1725 mL    OUT:    Indwelling Catheter - Urethral: 2775 mL  Total OUT: 2775 mL    Total NET: -1050 mL          Daily     Daily     LABS:                        11.2   10.09 )-----------( 390      ( 05 May 2019 06:30 )             35.2     05-05    133<L>  |  96<L>  |  11  ----------------------------<  135<H>  4.3   |  26  |  0.89    Ca    8.9      05 May 2019 06:30  Phos  1.9     05-05  Mg     1.7     05-05      PT/INR - ( 05 May 2019 06:30 )   PT: 13.0 SEC;   INR: 1.14          PTT - ( 05 May 2019 06:30 )  PTT:28.4 SEC      RADIOLOGY & ADDITIONAL STUDIES:    PHYSICAL EXAM  GEN: NAD, AOx3  RESP: breathing comfortably  ABD: Soft, mildly distended, appropriately tender, midline surgical site dressing has minimal serousanguinous spotting at inferior dressing, ostomy w/ air

## 2019-05-05 NOTE — PROGRESS NOTE ADULT - ASSESSMENT
4y Male s/p exlap, lysis of adhesions, biopsy of peritoneal lesions, uretheral dilitation and reyes placement by  POD2.     - d/c PCEA and transition to oral pain medication  - DVT ppx  - f/u a.m. labs including coags  - OOB  - regular diet  - continue reyes until outpatient follow up with   - PT - home w/ home PT    [facility] [id number]

## 2019-05-05 NOTE — PROGRESS NOTE ADULT - SUBJECTIVE AND OBJECTIVE BOX
Anesthesia Pain Management Service: Day __ of Epidural    SUBJECTIVE: Patient doing well with PCEA and no problems.  Pain Scale Score:   Refer to charted pain scores    THERAPY:  [x ] Epidural Bupivacaine 0.0625% and Hydromorphone  		[X ] 10 micrograms/mL	[ ] 5 micrograms/mL  [ ] Epidural Bupivacaine 0.0625% and Fentanyl - 2 micrograms/mL  [ ] Epidural Ropivacaine 0.1% plain – 1 mg/mL  [ ] Patient Controlled Regional Anesthesia (PCRA) Ropivacaine  		[ ] 0.2%			[ ] 0.1%    Demand dose __3_ lockout __15_ (minutes) Continuous Rate 6___ Total: _(Removed)__ Daily      MEDICATIONS  (STANDING):  acetaminophen   Tablet .. 650 milliGRAM(s) Oral every 6 hours  atorvastatin 40 milliGRAM(s) Oral at bedtime  dextrose 5%. 1000 milliLiter(s) (50 mL/Hr) IV Continuous <Continuous>  dextrose 50% Injectable 12.5 Gram(s) IV Push once  dextrose 50% Injectable 25 Gram(s) IV Push once  dextrose 50% Injectable 25 Gram(s) IV Push once  insulin lispro (HumaLOG) corrective regimen sliding scale   SubCutaneous three times a day before meals  lisinopril 10 milliGRAM(s) Oral daily  potassium phosphate / sodium phosphate powder 1 Packet(s) Oral once  potassium phosphate IVPB 30 milliMole(s) IV Intermittent once    MEDICATIONS  (PRN):  dextrose 40% Gel 15 Gram(s) Oral once PRN Blood Glucose LESS THAN 70 milliGRAM(s)/deciliter  glucagon  Injectable 1 milliGRAM(s) IntraMuscular once PRN Glucose LESS THAN 70 milligrams/deciliter  HYDROmorphone  Injectable 0.5 milliGRAM(s) IV Push every 3 hours PRN Breakthrough Pain or if not tolerating oral Oxycodone  oxyCODONE    IR 5 milliGRAM(s) Oral every 3 hours PRN Mild Pain (1 - 3) to moderate pain  oxyCODONE    IR 10 milliGRAM(s) Oral every 3 hours PRN Severe Pain (7 - 10)      OBJECTIVE:    Assessment of Catheter Site:	[ ] Left	[ ] Right  [x ] Epidural 	[ ] Femoral	      [ ] Saphenous   [ ] Supraclavicular   [ ] Other:    [x ] Dressing intact	[x ] Site non-tender	[ x] Site without erythema, discharge, edema  [x ] Epidural tubing and connection checked	[x] Gross neurological exam within normal limits  [X ] Catheter removed – tip intact		[ ] Afebrile	  [ ] Febrile: ___       [ X] see Temp under VS below)    PT/INR - ( 05 May 2019 06:30 )   PT: 13.0 SEC;   INR: 1.14          PTT - ( 05 May 2019 06:30 )  PTT:28.4 SEC                      11.2   10.09 )-----------( 390      ( 05 May 2019 06:30 )             35.2     Vital Signs Last 24 Hrs  T(C): 37.1 (05-05-19 @ 09:52), Max: 37.2 (05-04-19 @ 22:06)  T(F): 98.7 (05-05-19 @ 09:52), Max: 98.9 (05-04-19 @ 22:06)  HR: 80 (05-05-19 @ 09:52) (74 - 80)  BP: 154/80 (05-05-19 @ 09:52) (132/78 - 154/94)  BP(mean): --  RR: 18 (05-05-19 @ 09:52) (16 - 18)  SpO2: 98% (05-05-19 @ 09:52) (98% - 99%)      Sedation Score:	[x ] Alert	[ ] Drowsy	[ ] Arousable	[ ] Asleep	[ ] Unresponsive    Side Effects:	[x ] None	[ ] Nausea	[ ] Vomiting	[ ] Pruritus  		[ ] Weakness		[ ] Numbness	[ ] Other:    ASSESSMENT/ PLAN:    Therapy to  be:	[ ] Continue   [ X] Discontinued   [ X] Change to prn Analgesics    Documentation and Verification of current medications:  [ X ] Done	[ ] Not done, not eligible, reason:    Comments: Changed to IV/oral opioid and/or non-opioid Adjuvant analgesics to be used at this point as requested by surg team.    Progress Note written now but Patient was seen earlier.

## 2019-05-06 ENCOUNTER — TRANSCRIPTION ENCOUNTER (OUTPATIENT)
Age: 74
End: 2019-05-06

## 2019-05-06 VITALS
DIASTOLIC BLOOD PRESSURE: 96 MMHG | HEART RATE: 88 BPM | RESPIRATION RATE: 18 BRPM | TEMPERATURE: 98 F | SYSTOLIC BLOOD PRESSURE: 141 MMHG | OXYGEN SATURATION: 98 %

## 2019-05-06 LAB
ANION GAP SERPL CALC-SCNC: 12 MMO/L — SIGNIFICANT CHANGE UP (ref 7–14)
BUN SERPL-MCNC: 13 MG/DL — SIGNIFICANT CHANGE UP (ref 7–23)
CALCIUM SERPL-MCNC: 9.4 MG/DL — SIGNIFICANT CHANGE UP (ref 8.4–10.5)
CHLORIDE SERPL-SCNC: 97 MMOL/L — LOW (ref 98–107)
CO2 SERPL-SCNC: 24 MMOL/L — SIGNIFICANT CHANGE UP (ref 22–31)
CREAT SERPL-MCNC: 0.93 MG/DL — SIGNIFICANT CHANGE UP (ref 0.5–1.3)
GLUCOSE BLDC GLUCOMTR-MCNC: 126 MG/DL — HIGH (ref 70–99)
GLUCOSE BLDC GLUCOMTR-MCNC: 164 MG/DL — HIGH (ref 70–99)
GLUCOSE BLDC GLUCOMTR-MCNC: 99 MG/DL — SIGNIFICANT CHANGE UP (ref 70–99)
GLUCOSE SERPL-MCNC: 106 MG/DL — HIGH (ref 70–99)
HCT VFR BLD CALC: 34.1 % — LOW (ref 39–50)
HGB BLD-MCNC: 11.3 G/DL — LOW (ref 13–17)
MAGNESIUM SERPL-MCNC: 1.9 MG/DL — SIGNIFICANT CHANGE UP (ref 1.6–2.6)
MCHC RBC-ENTMCNC: 29.3 PG — SIGNIFICANT CHANGE UP (ref 27–34)
MCHC RBC-ENTMCNC: 33.1 % — SIGNIFICANT CHANGE UP (ref 32–36)
MCV RBC AUTO: 88.3 FL — SIGNIFICANT CHANGE UP (ref 80–100)
NRBC # FLD: 0 K/UL — SIGNIFICANT CHANGE UP (ref 0–0)
PHOSPHATE SERPL-MCNC: 3 MG/DL — SIGNIFICANT CHANGE UP (ref 2.5–4.5)
PLATELET # BLD AUTO: 404 K/UL — HIGH (ref 150–400)
PMV BLD: 9.5 FL — SIGNIFICANT CHANGE UP (ref 7–13)
POTASSIUM SERPL-MCNC: 4.3 MMOL/L — SIGNIFICANT CHANGE UP (ref 3.5–5.3)
POTASSIUM SERPL-SCNC: 4.3 MMOL/L — SIGNIFICANT CHANGE UP (ref 3.5–5.3)
RBC # BLD: 3.86 M/UL — LOW (ref 4.2–5.8)
RBC # FLD: 12.7 % — SIGNIFICANT CHANGE UP (ref 10.3–14.5)
SODIUM SERPL-SCNC: 133 MMOL/L — LOW (ref 135–145)
WBC # BLD: 10.81 K/UL — HIGH (ref 3.8–10.5)
WBC # FLD AUTO: 10.81 K/UL — HIGH (ref 3.8–10.5)

## 2019-05-06 RX ORDER — ACETAMINOPHEN 500 MG
2 TABLET ORAL
Qty: 0 | Refills: 0 | DISCHARGE
Start: 2019-05-06

## 2019-05-06 RX ORDER — SODIUM,POTASSIUM PHOSPHATES 278-250MG
1 POWDER IN PACKET (EA) ORAL ONCE
Qty: 0 | Refills: 0 | Status: DISCONTINUED | OUTPATIENT
Start: 2019-05-06 | End: 2019-05-06

## 2019-05-06 RX ADMIN — HEPARIN SODIUM 5000 UNIT(S): 5000 INJECTION INTRAVENOUS; SUBCUTANEOUS at 07:10

## 2019-05-06 RX ADMIN — Medication 650 MILLIGRAM(S): at 02:37

## 2019-05-06 RX ADMIN — LISINOPRIL 10 MILLIGRAM(S): 2.5 TABLET ORAL at 05:08

## 2019-05-06 RX ADMIN — Medication 650 MILLIGRAM(S): at 13:35

## 2019-05-06 RX ADMIN — Medication 650 MILLIGRAM(S): at 02:07

## 2019-05-06 RX ADMIN — Medication 650 MILLIGRAM(S): at 14:05

## 2019-05-06 NOTE — DISCHARGE NOTE NURSING/CASE MANAGEMENT/SOCIAL WORK - NSDCPNINST_GEN_ALL_CORE
Please NOTIFY MD for any of the following s/s: S/S infection (Fever >100.4, chills, increased redness, increased bleeding, pus-like drainage from incision line), uncontrolled pain not relieved by pain medications, persistent nausea/vomiting or inability to tolerate diet. No heavy lifting. Please drink 6-8 glasses of water daily to stay hydrated.

## 2019-05-06 NOTE — DISCHARGE NOTE PROVIDER - NSDCFUADDINST_GEN_ALL_CORE_FT
WOUND CARE:  Please keep incisions clean and dry. Please do not Scrub or rub incisions. Do not use lotion or powder on incisions.   BATHING: You may shower and/or sponge bathe. You may use warm soapy water in the shower and rinse, pat dry.  ACTIVITY: No heavy lifting or straining. Otherwise, you may return to your usual level of physical activity. If you are taking narcotic pain medication DO NOT drive a car, operate machinery or make important decisions.  DIET: Return to your usual diet.  NOTIFY YOUR SURGEON IF YOU HAVE: any bleeding that does not stop, any pus draining from your wound(s), any fever (over 100.4 F) persistent nausea/vomiting, or if your pain is not controlled on your discharge pain medications, unable to urinate.  Please follow up with your primary care physician in one week regarding your hospitalization, bring copies of your discharge paperwork.  Please follow up with your surgeon, Dr. Jones.    You will be discharged with a urinary (Shepard) catheter. Please call [phone] to make an appointment to see your urologist, Dr. Herve Grace, in 2 weeks. Please do not remove the catheter. It will be removed in the office.

## 2019-05-06 NOTE — DISCHARGE NOTE PROVIDER - CARE PROVIDERS DIRECT ADDRESSES
,[email or web address]

## 2019-05-06 NOTE — DISCHARGE NOTE NURSING/CASE MANAGEMENT/SOCIAL WORK - NSDCPEEMAIL_GEN_ALL_CORE
[facility] for Tobacco Control email [email or web address]

## 2019-05-06 NOTE — PROGRESS NOTE ADULT - ASSESSMENT
4y Male s/p exlap, lysis of adhesions, biopsy of peritoneal lesions, uretheral dilitation and reyes placement by  POD3.     - oral pain meds  - DVT ppx  - f/u a.m. labs  - OOB  - regular diet  - continue reyes until outpatient follow up with   - PT - home w/ home PT    A Surgery [id number]

## 2019-05-06 NOTE — DISCHARGE NOTE NURSING/CASE MANAGEMENT/SOCIAL WORK - NSDCDPATPORTLINK_GEN_ALL_CORE
You can access the [facility] Portal, offered by [facility], by registering with the following website: [email or web address]

## 2019-05-06 NOTE — DISCHARGE NOTE PROVIDER - NSDCCPCAREPLAN_GEN_ALL_CORE_FT
PRINCIPAL DISCHARGE DIAGNOSIS  Diagnosis: Large bowel obstruction  Assessment and Plan of Treatment:

## 2019-05-06 NOTE — DISCHARGE NOTE NURSING/CASE MANAGEMENT/SOCIAL WORK - NSSCTYPOFSERV_GEN_ALL_CORE
RN/ [facility] RN/ [facility]. Nurse to visit on 05/07/2019. Nurse to call prior to visit to arrange.,

## 2019-05-06 NOTE — DISCHARGE NOTE PROVIDER - HOSPITAL COURSE
This patient is a 75y/o male with a PMH of Type 2 DM, HTN, HLD, prostate cancer and rectal cancer who was scheduled for an open low anterior resection reversal of colostomy on 5/3/2019.  Pt states, "had bowel obstruction, s/p bowel resection and colostomy placement." Prior to the procedure, the patient underwent cystocopy with urology and was found to have a urethral stricture which was dilated, followed by the placement of a reyes catheter by urology. The patient underwent an exploratory laparotomy with lysis of adhesions and perionteal and omental biopsies on 5/3/2019. Note, the patient's colostomy was not reversed. Post operatively, the patient's diet was slowly advanced as tolerated.  At this time, patient is tolerating a regular diet, ambulating and voiding.  Patient has been deemed stable for discharge at this time. This patient is a 73y/o male with a PMH of Type 2 DM, HTN, HLD, prostate cancer and rectal cancer who was scheduled for an open low anterior resection reversal of colostomy on 5/3/2019.  Pt states, "had bowel obstruction, s/p bowel resection and colostomy placement." Prior to the procedure, the patient underwent cystocopy with urology and was found to have a urethral stricture which was dilated, followed by the placement of a reyes catheter by urology. The patient underwent an exploratory laparotomy with lysis of adhesions and perionteal and omental biopsies on 5/3/2019. Note, the patient's colostomy was not reversed. On 5/5, the patient's PCEA was removed. Post operatively, the patient's diet was slowly advanced as tolerated.  At this time, patient is tolerating a regular diet, ambulating and voiding.  Patient has been deemed stable for discharge at this time.

## 2019-05-06 NOTE — DISCHARGE NOTE PROVIDER - CARE PROVIDER_API CALL
Herve Jones)  ColonRectal Surgery; Surgery  [facility], [address] [place] [address]  Phone: [phone]  Fax: [phone]  Follow Up Time: 1 week Herve Jones (MD)  ColonRectal Surgery; Surgery  [facility], [address] [place] [id number]  Phone: [phone]  Fax: [phone]  Follow Up Time: 1 week    Herve Grace)  Urology  [address] [facility] [address] [place] [address]  Phone: [phone]  Fax: [phone]  Follow Up Time: 2 weeks

## 2019-05-06 NOTE — DISCHARGE NOTE NURSING/CASE MANAGEMENT/SOCIAL WORK - NSDCPEWEB_GEN_ALL_CORE
[facility] for Tobacco Control website --- [email or web address] website --- [email or web address]

## 2019-05-06 NOTE — DISCHARGE NOTE NURSING/CASE MANAGEMENT/SOCIAL WORK - NSDCPNDISPN_GEN_ALL_CORE
Opioids not applicable/not prescribed/Education provided on the pain management plan of care/Side effects of pain management treatment/Activities of daily living, including home environment that might     exacerbate pain or reduce effectiveness of the pain management plan of care as well as strategies to address these issues

## 2019-05-06 NOTE — DISCHARGE NOTE PROVIDER - PROVIDER TOKENS
PROVIDER:[TOKEN:[[id number]],FOLLOWUP:[1 week]] PROVIDER:[TOKEN:[[id number]],FOLLOWUP:[1 week]],PROVIDER:[TOKEN:[[id number]],FOLLOWUP:[2 weeks]]

## 2019-05-06 NOTE — PROGRESS NOTE ADULT - SUBJECTIVE AND OBJECTIVE BOX
GENERAL SURGERY DAILY PROGRESS NOTE:     Subjective:  Pt seen and examined. No acute events overnight. Pain well controlled. Tolerating diet. Has air in the ostomy. Midline dressing changed yesterday.     Objective:    MEDICATIONS  (STANDING):  acetaminophen   Tablet .. 650 milliGRAM(s) Oral every 6 hours  atorvastatin 40 milliGRAM(s) Oral at bedtime  dextrose 5%. 1000 milliLiter(s) (50 mL/Hr) IV Continuous <Continuous>  dextrose 50% Injectable 12.5 Gram(s) IV Push once  dextrose 50% Injectable 25 Gram(s) IV Push once  dextrose 50% Injectable 25 Gram(s) IV Push once  heparin  Injectable 5000 Unit(s) SubCutaneous every 8 hours  insulin lispro (HumaLOG) corrective regimen sliding scale   SubCutaneous three times a day before meals  lisinopril 10 milliGRAM(s) Oral daily    MEDICATIONS  (PRN):  dextrose 40% Gel 15 Gram(s) Oral once PRN Blood Glucose LESS THAN 70 milliGRAM(s)/deciliter  glucagon  Injectable 1 milliGRAM(s) IntraMuscular once PRN Glucose LESS THAN 70 milligrams/deciliter  HYDROmorphone  Injectable 0.5 milliGRAM(s) IV Push every 3 hours PRN Breakthrough Pain or if not tolerating oral Oxycodone  oxyCODONE    IR 5 milliGRAM(s) Oral every 3 hours PRN Mild Pain (1 - 3) to moderate pain  oxyCODONE    IR 10 milliGRAM(s) Oral every 3 hours PRN Severe Pain (7 - 10)      Vital Signs Last 24 Hrs  T(C): 37.1 (05 May 2019 22:16), Max: 37.3 (05 May 2019 17:48)  T(F): 98.7 (05 May 2019 22:16), Max: 99.1 (05 May 2019 17:48)  HR: 76 (05 May 2019 22:16) (76 - 93)  BP: 149/87 (05 May 2019 22:16) (135/71 - 159/93)  BP(mean): --  RR: 16 (05 May 2019 22:16) (16 - 18)  SpO2: 98% (05 May 2019 22:16) (97% - 100%)    I&O's Detail    04 May 2019 07:01  -  05 May 2019 07:00  --------------------------------------------------------  IN:    dextrose 5% + sodium chloride 0.45% with potassium chloride 20 mEq/L: 975 mL    lactated ringers.: 750 mL  Total IN: 1725 mL    OUT:    Indwelling Catheter - Urethral: 2775 mL  Total OUT: 2775 mL    Total NET: -1050 mL      05 May 2019 07:01  -  06 May 2019 00:27  --------------------------------------------------------  IN:    IV PiggyBack: 550 mL    Oral Fluid: 1160 mL  Total IN: 1710 mL    OUT:    Colostomy: 20 mL    Indwelling Catheter - Urethral: 1745 mL  Total OUT: 1765 mL    Total NET: -55 mL          Daily     Daily     LABS:                        11.2   10.09 )-----------( 390      ( 05 May 2019 06:30 )             35.2     05-05    133<L>  |  96<L>  |  11  ----------------------------<  135<H>  4.3   |  26  |  0.89    Ca    8.9      05 May 2019 06:30  Phos  1.9     05-05  Mg     1.7     05-05      PT/INR - ( 05 May 2019 06:30 )   PT: 13.0 SEC;   INR: 1.14          PTT - ( 05 May 2019 06:30 )  PTT:28.4 SEC      RADIOLOGY & ADDITIONAL STUDIES:    PHYSICAL EXAM  GEN: NAD, AOx3  RESP: breathing comfortably  ABD: Soft, mildly distended, appropriately tender, midline surgical site dressing has minimal serousanguinous spotting at inferior dressing, ostomy w/ air

## 2019-05-08 PROBLEM — E11.9 TYPE 2 DIABETES MELLITUS WITHOUT COMPLICATIONS: Chronic | Status: ACTIVE | Noted: 2019-04-22

## 2019-05-08 PROBLEM — K57.90 DIVERTICULOSIS OF INTESTINE, PART UNSPECIFIED, WITHOUT PERFORATION OR ABSCESS WITHOUT BLEEDING: Chronic | Status: ACTIVE | Noted: 2019-04-22

## 2019-05-09 LAB — SURGICAL PATHOLOGY STUDY: SIGNIFICANT CHANGE UP

## 2019-05-14 ENCOUNTER — APPOINTMENT (OUTPATIENT)
Dept: COLORECTAL SURGERY | Facility: CLINIC | Age: 74
End: 2019-05-14
Payer: MEDICARE

## 2019-05-14 VITALS
DIASTOLIC BLOOD PRESSURE: 78 MMHG | TEMPERATURE: 98.2 F | HEART RATE: 83 BPM | RESPIRATION RATE: 16 BRPM | OXYGEN SATURATION: 99 % | SYSTOLIC BLOOD PRESSURE: 146 MMHG

## 2019-05-14 PROCEDURE — 99024 POSTOP FOLLOW-UP VISIT: CPT

## 2019-05-14 RX ORDER — METRONIDAZOLE 500 MG/1
500 TABLET ORAL
Qty: 3 | Refills: 0 | Status: DISCONTINUED | COMMUNITY
Start: 2019-05-01 | End: 2019-05-14

## 2019-05-14 RX ORDER — NEOMYCIN SULFATE 500 MG/1
500 TABLET ORAL
Qty: 6 | Refills: 0 | Status: DISCONTINUED | COMMUNITY
Start: 2019-05-01 | End: 2019-05-14

## 2019-05-14 NOTE — HISTORY OF PRESENT ILLNESS
[FreeTextEntry1] : Status post exploratory laparotomy with findings of peritoneal nodules. Biopsy returned as adenocarcinoma with signet cells. Patient currently progressing well. Tolerating diet. Improving pain. Denies fevers or chills

## 2019-05-14 NOTE — ASSESSMENT
[FreeTextEntry1] : Likely colon cancer with peritoneal carcinomatosis\par -Patient to followup with oncology\par -Follow up in one week for remaining staple removal\par -We'll discuss further with oncology

## 2019-05-16 ENCOUNTER — OUTPATIENT (OUTPATIENT)
Dept: OUTPATIENT SERVICES | Facility: HOSPITAL | Age: 74
LOS: 1 days | Discharge: HOME | End: 2019-05-16

## 2019-05-16 DIAGNOSIS — K56.609 UNSPECIFIED INTESTINAL OBSTRUCTION, UNSPECIFIED AS TO PARTIAL VERSUS COMPLETE OBSTRUCTION: Chronic | ICD-10-CM

## 2019-05-16 DIAGNOSIS — Z90.79 ACQUIRED ABSENCE OF OTHER GENITAL ORGAN(S): Chronic | ICD-10-CM

## 2019-05-16 DIAGNOSIS — C18.9 MALIGNANT NEOPLASM OF COLON, UNSPECIFIED: ICD-10-CM

## 2019-05-16 DIAGNOSIS — Z90.5 ACQUIRED ABSENCE OF KIDNEY: Chronic | ICD-10-CM

## 2019-05-20 ENCOUNTER — APPOINTMENT (OUTPATIENT)
Dept: HEMATOLOGY ONCOLOGY | Facility: CLINIC | Age: 74
End: 2019-05-20
Payer: MEDICARE

## 2019-05-20 ENCOUNTER — RESULT REVIEW (OUTPATIENT)
Age: 74
End: 2019-05-20

## 2019-05-20 ENCOUNTER — OTHER (OUTPATIENT)
Age: 74
End: 2019-05-20

## 2019-05-20 VITALS
DIASTOLIC BLOOD PRESSURE: 76 MMHG | HEART RATE: 95 BPM | WEIGHT: 121.25 LBS | RESPIRATION RATE: 14 BRPM | HEIGHT: 61.97 IN | TEMPERATURE: 98 F | SYSTOLIC BLOOD PRESSURE: 120 MMHG | OXYGEN SATURATION: 99 % | BODY MASS INDEX: 22.31 KG/M2

## 2019-05-20 LAB
APTT BLD: 31.2 SEC
BASOPHILS # BLD AUTO: 0.1 K/UL — SIGNIFICANT CHANGE UP (ref 0–0.2)
BASOPHILS NFR BLD AUTO: 0.7 % — SIGNIFICANT CHANGE UP (ref 0–2)
EOSINOPHIL # BLD AUTO: 0.8 K/UL — HIGH (ref 0–0.5)
EOSINOPHIL NFR BLD AUTO: 7.8 % — HIGH (ref 0–6)
HCT VFR BLD CALC: 32.3 % — LOW (ref 39–50)
HGB BLD-MCNC: 11 G/DL — LOW (ref 13–17)
INR PPP: 0.95 RATIO
LYMPHOCYTES # BLD AUTO: 1.8 K/UL — SIGNIFICANT CHANGE UP (ref 1–3.3)
LYMPHOCYTES # BLD AUTO: 18.3 % — SIGNIFICANT CHANGE UP (ref 13–44)
MCHC RBC-ENTMCNC: 30.4 PG — SIGNIFICANT CHANGE UP (ref 27–34)
MCHC RBC-ENTMCNC: 34 G/DL — SIGNIFICANT CHANGE UP (ref 32–36)
MCV RBC AUTO: 89.4 FL — SIGNIFICANT CHANGE UP (ref 80–100)
MONOCYTES # BLD AUTO: 0.7 K/UL — SIGNIFICANT CHANGE UP (ref 0–0.9)
MONOCYTES NFR BLD AUTO: 7 % — SIGNIFICANT CHANGE UP (ref 2–14)
NEUTROPHILS # BLD AUTO: 6.6 K/UL — SIGNIFICANT CHANGE UP (ref 1.8–7.4)
NEUTROPHILS NFR BLD AUTO: 66.1 % — SIGNIFICANT CHANGE UP (ref 43–77)
PLATELET # BLD AUTO: 654 K/UL — HIGH (ref 150–400)
PT BLD: 10.7 SEC
RBC # BLD: 3.62 M/UL — LOW (ref 4.2–5.8)
RBC # FLD: 12.3 % — SIGNIFICANT CHANGE UP (ref 10.3–14.5)
WBC # BLD: 10 K/UL — SIGNIFICANT CHANGE UP (ref 3.8–10.5)
WBC # FLD AUTO: 10 K/UL — SIGNIFICANT CHANGE UP (ref 3.8–10.5)

## 2019-05-20 PROCEDURE — 99205 OFFICE O/P NEW HI 60 MIN: CPT

## 2019-05-20 NOTE — REASON FOR VISIT
[Initial Consultation] : an initial consultation [Family Member] : family member [FreeTextEntry2] : metastatic signet ring cell adeno

## 2019-05-20 NOTE — REVIEW OF SYSTEMS
[Fatigue] : fatigue [Recent Change In Weight] : ~T recent weight change [Negative] : Allergic/Immunologic [Abdominal Pain] : no abdominal pain

## 2019-05-20 NOTE — HISTORY OF PRESENT ILLNESS
[Disease: _____________________] : Disease: [unfilled] [M: ___] : M[unfilled] [AJCC Stage: ____] : AJCC Stage: [unfilled] [de-identified] : Yobani is a 75 y/o male diagnosed with metastatic GI adenocarcinoma of descending colon presumed s/p diverting colostomy and mucus fistula in January and May 2019.  Colonoscopy was attempted but unable to biopsy the primary site or reach it.  \par \par FH as below\par \par Pt h/o colonoscopy 10 years ago that had polyps but no follow up colonoscopy was performed \par

## 2019-05-20 NOTE — PHYSICAL EXAM
[Ambulatory and capable of all self care but unable to carry out any work activities] : Status 2- Ambulatory and capable of all self care but unable to carry out any work activities. Up and about more than 50% of waking hours [Normal] : affect appropriate [de-identified] : ostomy , mucus fistula, staples in midline incision

## 2019-05-21 ENCOUNTER — TRANSCRIPTION ENCOUNTER (OUTPATIENT)
Age: 74
End: 2019-05-21

## 2019-05-21 ENCOUNTER — APPOINTMENT (OUTPATIENT)
Dept: COLORECTAL SURGERY | Facility: CLINIC | Age: 74
End: 2019-05-21
Payer: MEDICARE

## 2019-05-21 LAB
ALBUMIN SERPL ELPH-MCNC: 4.1 G/DL
ALP BLD-CCNC: 117 U/L
ALT SERPL-CCNC: 51 U/L
ANION GAP SERPL CALC-SCNC: 14 MMOL/L
AST SERPL-CCNC: 24 U/L
BILIRUB SERPL-MCNC: 0.3 MG/DL
BUN SERPL-MCNC: 15 MG/DL
CALCIUM SERPL-MCNC: 9.5 MG/DL
CEA SERPL-MCNC: 2.7 NG/ML
CHLORIDE SERPL-SCNC: 99 MMOL/L
CO2 SERPL-SCNC: 26 MMOL/L
CREAT SERPL-MCNC: 0.94 MG/DL
GLUCOSE SERPL-MCNC: 149 MG/DL
HAV IGM SER QL: NONREACTIVE
HBV CORE IGM SER QL: NONREACTIVE
HBV SURFACE AG SER QL: NONREACTIVE
HCV AB SER QL: NONREACTIVE
HCV S/CO RATIO: 0.17 S/CO
POTASSIUM SERPL-SCNC: 4.5 MMOL/L
PROT SERPL-MCNC: 7.4 G/DL
SODIUM SERPL-SCNC: 139 MMOL/L

## 2019-05-21 PROCEDURE — 99024 POSTOP FOLLOW-UP VISIT: CPT

## 2019-05-21 NOTE — ASSESSMENT
[FreeTextEntry1] : Colon cancer\par -patient progressing well\par -Diet as tolerated\par -Proceed with chemotherapy\par -Follow up in 4 weeks for reevaluation

## 2019-05-21 NOTE — HISTORY OF PRESENT ILLNESS
[FreeTextEntry1] : Status post exploratory laparotomy for sigmoid colon resection and procedure aborted due to the carcinomatosis. Patient currently progressing well.\par \par Chemotherapy. Tolerating diet. Stoma functioning

## 2019-05-24 ENCOUNTER — APPOINTMENT (OUTPATIENT)
Dept: DERMATOLOGY | Facility: CLINIC | Age: 74
End: 2019-05-24
Payer: MEDICARE

## 2019-05-24 ENCOUNTER — OTHER (OUTPATIENT)
Age: 74
End: 2019-05-24

## 2019-05-24 VITALS
DIASTOLIC BLOOD PRESSURE: 82 MMHG | SYSTOLIC BLOOD PRESSURE: 140 MMHG | WEIGHT: 122 LBS | BODY MASS INDEX: 23.03 KG/M2 | HEIGHT: 61 IN

## 2019-05-24 DIAGNOSIS — Z91.89 OTHER SPECIFIED PERSONAL RISK FACTORS, NOT ELSEWHERE CLASSIFIED: ICD-10-CM

## 2019-05-24 PROCEDURE — 99203 OFFICE O/P NEW LOW 30 MIN: CPT

## 2019-05-28 ENCOUNTER — FORM ENCOUNTER (OUTPATIENT)
Age: 74
End: 2019-05-28

## 2019-05-29 ENCOUNTER — OUTPATIENT (OUTPATIENT)
Dept: OUTPATIENT SERVICES | Facility: HOSPITAL | Age: 74
LOS: 1 days | End: 2019-05-29
Payer: COMMERCIAL

## 2019-05-29 ENCOUNTER — APPOINTMENT (OUTPATIENT)
Dept: NUCLEAR MEDICINE | Facility: IMAGING CENTER | Age: 74
End: 2019-05-29
Payer: MEDICARE

## 2019-05-29 DIAGNOSIS — C80.1 MALIGNANT (PRIMARY) NEOPLASM, UNSPECIFIED: ICD-10-CM

## 2019-05-29 DIAGNOSIS — Z90.79 ACQUIRED ABSENCE OF OTHER GENITAL ORGAN(S): Chronic | ICD-10-CM

## 2019-05-29 DIAGNOSIS — Z90.5 ACQUIRED ABSENCE OF KIDNEY: Chronic | ICD-10-CM

## 2019-05-29 DIAGNOSIS — K56.609 UNSPECIFIED INTESTINAL OBSTRUCTION, UNSPECIFIED AS TO PARTIAL VERSUS COMPLETE OBSTRUCTION: Chronic | ICD-10-CM

## 2019-05-29 PROCEDURE — 78815 PET IMAGE W/CT SKULL-THIGH: CPT | Mod: 26,PI

## 2019-05-29 PROCEDURE — A9552: CPT

## 2019-05-29 PROCEDURE — 78815 PET IMAGE W/CT SKULL-THIGH: CPT

## 2019-06-18 NOTE — HISTORY OF PRESENT ILLNESS
[FreeTextEntry1] : Status post exploratory laparotomy and peritoneal biopsy consistent with colon cancer. Patient progressing well. Patient is initiated chemotherapy. Tolerating diet. Stable weight. Denies pain.

## 2019-06-18 NOTE — ASSESSMENT
[FreeTextEntry1] : Colon cancer\par -Patient progressed well\par -Continue chemotherapy\par -Follow up in 3 months for reevaluation

## 2019-06-28 NOTE — HISTORY OF PRESENT ILLNESS
[Disease: _____________________] : Disease: [unfilled] [M: ___] : M[unfilled] [AJCC Stage: ____] : AJCC Stage: [unfilled] [de-identified] : Yobani is a 75 y/o male diagnosed with metastatic GI adenocarcinoma of descending colon presumed s/p diverting colostomy and mucus fistula in January and May 2019.  Colonoscopy was attempted but unable to biopsy the primary site or reach it.  \par \par FH as below\par \par Pt h/o colonoscopy 10 years ago that had polyps but no follow up colonoscopy was performed \par  [FreeTextEntry1] : FOLFOX  [de-identified] : tolerated first cycle of chemo, no neuropathy or abdominal pain , denies fevers or chills \par denies diarrhea

## 2019-06-28 NOTE — PHYSICAL EXAM
[Normal] : affect appropriate [Restricted in physically strenuous activity but ambulatory and able to carry out work of a light or sedentary nature] : Status 1- Restricted in physically strenuous activity but ambulatory and able to carry out work of a light or sedentary nature, e.g., light house work, office work [de-identified] : ostomy , mucus fistula, staples in midline incision

## 2019-08-09 NOTE — HISTORY OF PRESENT ILLNESS
[Disease: _____________________] : Disease: [unfilled] [M: ___] : M[unfilled] [AJCC Stage: ____] : AJCC Stage: [unfilled] [de-identified] : Yobani is a 75 y/o male diagnosed with metastatic GI adenocarcinoma of descending colon presumed s/p diverting colostomy and mucus fistula in January and May 2019.  Colonoscopy was attempted but unable to biopsy the primary site or reach it.  \par \par FH as below\par \par Pt h/o colonoscopy 10 years ago that had polyps but no follow up colonoscopy was performed \par \par started on FOLFOX with palliative intent on Valeria 10, 2019\par  [de-identified] : pan-edith wild type and left side primary\par \par  [de-identified] : CEA  [FreeTextEntry1] : Palliative FOLFOX (Vectibix added cycle 2) s/p cycle 3 [de-identified] : Yobani comes in for follow up with son while on FOLFOX and Vectibix and states the first week after chemotherapy, he has poor appetite but that subsequently improves.  He denies any issues with n/v/d/c, mouth sores, pain, melena, hematochezia.  His CEA doubled.  He takes his Doxycycline and denies any new rashes.  \par \par \par

## 2019-08-09 NOTE — PHYSICAL EXAM
[Restricted in physically strenuous activity but ambulatory and able to carry out work of a light or sedentary nature] : Status 1- Restricted in physically strenuous activity but ambulatory and able to carry out work of a light or sedentary nature, e.g., light house work, office work [Normal] : affect appropriate [de-identified] : ostomy , mucus fistula,

## 2019-08-09 NOTE — REVIEW OF SYSTEMS
[Fatigue] : fatigue [Negative] : Allergic/Immunologic [Chest Pain] : no chest pain [Mucosal Pain] : no mucosal pain [Vomiting] : no vomiting [Abdominal Pain] : no abdominal pain [Diarrhea] : no diarrhea [Constipation] : no constipation [Skin Rash] : no skin rash [FreeTextEntry7] : decreased appetite

## 2019-08-28 NOTE — PHYSICAL EXAM
[Restricted in physically strenuous activity but ambulatory and able to carry out work of a light or sedentary nature] : Status 1- Restricted in physically strenuous activity but ambulatory and able to carry out work of a light or sedentary nature, e.g., light house work, office work [Normal] : grossly intact [de-identified] : ostomy , mucus fistula,

## 2019-08-28 NOTE — HISTORY OF PRESENT ILLNESS
[Disease: _____________________] : Disease: [unfilled] [M: ___] : M[unfilled] [AJCC Stage: ____] : AJCC Stage: [unfilled] [de-identified] : Yobani is a 73 y/o male diagnosed with metastatic GI adenocarcinoma of descending colon presumed s/p diverting colostomy and mucus fistula in January and May 2019.  Colonoscopy was attempted but unable to biopsy the primary site or reach it.  \par \par FH as below\par \par Pt h/o colonoscopy 10 years ago that had polyps but no follow up colonoscopy was performed \par \par started on FOLFOX with palliative intent on Valeria 10, 2019\par  [de-identified] : CEA  [de-identified] : pan-edith wild type and left side primary\par \par  [FreeTextEntry1] : Palliative FOLFOX (Vectibix added cycle 2) s/p cycle 4 [de-identified] : Yobani comes in for follow up while on FOLFOX and Vectibix\par rash manageable\par no diarrhea\par no abdominal pain \par   \par \par \par

## 2019-08-28 NOTE — REVIEW OF SYSTEMS
[Fatigue] : fatigue [Negative] : Allergic/Immunologic [Skin Rash] : skin rash [Mucosal Pain] : no mucosal pain [Chest Pain] : no chest pain [Abdominal Pain] : no abdominal pain [Vomiting] : no vomiting [Constipation] : no constipation [Diarrhea] : no diarrhea [FreeTextEntry7] : decreased appetite

## 2019-09-17 NOTE — ASSESSMENT
[FreeTextEntry1] : Stage IV colon cancer\par -Continue chemotherapy\par -Diet as tolerated\par -Continue to follow up with oncology\par -Followup in 3-6 months for reevaluation\par -CEA level equal to 3.7

## 2019-09-17 NOTE — HISTORY OF PRESENT ILLNESS
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 Patient currently on chemotherapy. Denies pain. No fevers or chills. Normal stoma function. Recent CT scan showed decrease in primary tumor size. Patient tolerating diet without issue

## 2019-09-19 NOTE — PHYSICAL EXAM
[Restricted in physically strenuous activity but ambulatory and able to carry out work of a light or sedentary nature] : Status 1- Restricted in physically strenuous activity but ambulatory and able to carry out work of a light or sedentary nature, e.g., light house work, office work [Normal] : affect appropriate [de-identified] : ostomy , mucus fistula,

## 2019-09-19 NOTE — REVIEW OF SYSTEMS
[Fatigue] : fatigue [Skin Rash] : skin rash [Negative] : Heme/Lymph [Mucosal Pain] : no mucosal pain [Chest Pain] : no chest pain [Abdominal Pain] : no abdominal pain [Vomiting] : no vomiting [Constipation] : no constipation [Diarrhea] : no diarrhea [FreeTextEntry7] : decreased appetite

## 2019-09-19 NOTE — RESULTS/DATA
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 On chemotherapy. Assess response to therapy.   COMPARISON: No prior chest CT scan is available for comparison. Comparison  is made to the abdomen and pelvis CT scan dated 1/26/2019 and to the PET/CT  scan dated 5/29/2019.   PROCEDURE:  CT of the chest, Abdomen and Pelvis was performed with intravenous contrast.  Intravenous contrast: 90 ml Omnipaque 350. 10 ml discarded.  Oral contrast: positive contrast was administered.  Sagittal and coronal reformats were performed.   FINDINGS:   CHEST:   LUNGS AND LARGE AIRWAYS: Patent central airways. No pulmonary nodules.  PLEURA: No pleural effusion.  VESSELS: Thoracic aortic and coronary artery calcifications visualized.  HEART: Heart size is normal. No pericardial effusion.  MEDIASTINUM AND KOURTNEY: Numerous small mediastinal lymph nodes appreciated.  This includes a reference precarinal lymph node measuring 2.0 x 0.8 cm  (series 2, image 33), unchanged.  CHEST WALL AND LOWER NECK: A right chest wall chemotherapy infusion catheter  is present with catheter tip at the cavoatrial junction.   ABDOMEN AND PELVIS:   LIVER: Within normal limits. Small left renal cyst and subcentimeter hepatic  hypodense lesion that is too small to characterize.  BILE DUCTS: Normal caliber.  GALLBLADDER: Within normal limits.  SPLEEN: Within normal limits.  PANCREAS: Within normal limits.  ADRENALS: Within normal limits.  KIDNEYS/URETERS: The kidneys enhance symmetrically without evidence for  hydronephrosis. There are bilateral renal cysts and subcentimeter hypodense  lesions that are too small to characterize. Stable postsurgical changes are  seen in the left kidney.   BLADDER: Within normal limits.  REPRODUCTIVE ORGANS: Status post prostatectomy. Multiple pelvic surgical  clips appreciated.   BOWEL: The patient is status post diverting colostomy and mucous fistula.  Thickening of the sigmoid colon with associated stranding and inflammation  is slightly less bulky in appearance, corresponding to patient's primary  malignancy. Colonic diverticulosis seen.  PERITONEUM: No ascites.  VESSELS: Abdominal aortic vascular calcifications appreciated.  RETROPERITONEUM/LYMPH NODES:  Previously referenced ill-defined FDG avid perisigmoid lymphadenopathy is as  follows on the current study:  2.4 x 1.6 cm on the current exam (series 2, image 132), previously measuring  2.2 x 1.5 cm.  An FDG avid lymph node anterior to the abdominal aorta is seen on series 2,  image 107 which measures 1.2 x 0.7 cm is unchanged.  A nodule in the right lateral mid abdomen as seen on series 2, image 103  measures 1.0 x 0.3 cm on the current exam, previously measuring 1.2 x 0.6 cm.   ABDOMINAL WALL: Postsurgical changes in the anterior abdominal wall, status  post diverting colostomy and mucous fistula.  BONES: Degenerative changes in the spine.   IMPRESSION:  Status post interval diverting colostomy and mucous fistula.   Thickened appearance of the sigmoid colon with associated pericolonic  stranding, corresponding to patient's presumed primary malignancy. This is  slightly less bulky in appearance compared with the PET/CT scan from May  2019.   Multiple small, nonspecific mediastinal lymph nodes. Small nodules again  visualized within the abdomen and retroperitoneum, previously noted to be  FDG avid. These are in conglomerate, not significantly changed compared with  the prior study, with reference lesions described above.           YANE COLE M.D., ATTENDING RADIOLOGIST Phone #: [phone]  This document has been electronically signed. Sep 9 2019 9:54AM

## 2019-09-19 NOTE — HISTORY OF PRESENT ILLNESS
[Disease: _____________________] : Disease: [unfilled] [AJCC Stage: ____] : AJCC Stage: [unfilled] [M: ___] : M[unfilled] [de-identified] : Yobani is a 75 y/o male diagnosed with metastatic GI adenocarcinoma of descending colon presumed s/p diverting colostomy and mucus fistula in January and May 2019.  Colonoscopy was attempted but unable to biopsy the primary site or reach it.  \par \par FH as below\par \par Pt h/o colonoscopy 10 years ago that had polyps but no follow up colonoscopy was performed \par \par started on FOLFOX with palliative intent on [place] 10, 2019\par  [de-identified] : CEA  [de-identified] : pan-edith wild type and left side primary\par \par  [FreeTextEntry1] : Palliative FOLFOX (Vectibix added cycle 2) s/p cycle 6 [de-identified] : Yobani comes in for follow up while on FOLFOX and Vectibix and had CT c/a/p on 9/6/19 which showed status post interval diverting colostomy and mucous fistula.  Thickened appearance of the sigmoid colon with associated pericolonic stranding, corresponding to patient's presumed primary malignancy. This is slightly less bulky in appearance compared with the PET/CT scan from May 2019.  Multiple small, nonspecific mediastinal lymph nodes. Small nodules again visualized within the abdomen and retroperitoneum, previously noted to be FDG avid. These are in conglomerate, not significantly changed compared with the prior study, with reference lesions described above. \par \par his rash has worsened since last visit \par \par \par \par \par rash manageable\par no diarrhea\par no abdominal pain \par   \par \par \par

## 2019-09-20 NOTE — REVIEW OF SYSTEMS
[Fatigue] : fatigue [Skin Rash] : skin rash [Negative] : Allergic/Immunologic [Recent Change In Weight] : ~T no recent weight change [Mucosal Pain] : no mucosal pain [Chest Pain] : no chest pain [Lower Ext Edema] : no lower extremity edema [Abdominal Pain] : no abdominal pain [Vomiting] : no vomiting [Constipation] : no constipation [Diarrhea] : no diarrhea [FreeTextEntry4] : drainage for pus from R ear

## 2019-09-20 NOTE — PHYSICAL EXAM
[Restricted in physically strenuous activity but ambulatory and able to carry out work of a light or sedentary nature] : Status 1- Restricted in physically strenuous activity but ambulatory and able to carry out work of a light or sedentary nature, e.g., light house work, office work [Normal] : affect appropriate [de-identified] : R ear with effusion and pus in ear canal [de-identified] : port - dosi-infuser  [de-identified] : ostomy , mucus fistula,

## 2019-09-20 NOTE — HISTORY OF PRESENT ILLNESS
[Disease: _____________________] : Disease: [unfilled] [M: ___] : M[unfilled] [AJCC Stage: ____] : AJCC Stage: [unfilled] [de-identified] : Yobani is a 75 y/o male diagnosed with metastatic GI adenocarcinoma of descending colon presumed s/p diverting colostomy and mucus fistula in January and May 2019.  Colonoscopy was attempted but unable to biopsy the primary site or reach it.  \par \par FH as below\par \par Pt h/o colonoscopy 10 years ago that had polyps but no follow up colonoscopy was performed \par \par started on FOLFOX with palliative intent on [place] 10, 2019\par  [de-identified] : CEA  [de-identified] : pan-edith wild type and left side primary\par \par  [FreeTextEntry1] : Palliative FOLFOX (Vectibix added cycle 2) s/p cycle 7 [de-identified] : Yobani comes in for follow up while on FOLFOX and Vectibix and here for disconnect today.  He denies issues with n/v/d/c, abdominal pain, CP, LOPEZ and stats his neuropathy is mild, only cold related and usually after 5 days, he is able to eat/drink/touch cold.  He weight and appetite are stable and rash is manageable.  He states for last 3 weeks, he has been having yellowish drainage from R ear without pain, fevers or chills but forgot to mention last time.  \par \par \par \par \par

## 2019-10-25 NOTE — HISTORY OF PRESENT ILLNESS
[M: ___] : M[unfilled] [AJCC Stage: ____] : AJCC Stage: [unfilled] [Disease: _____________________] : Disease: [unfilled] [de-identified] : CEA  [de-identified] : pan-edith wild type and left side primary\par \par  [de-identified] : Yobani is a 75 y/o male diagnosed with metastatic GI adenocarcinoma of descending colon presumed s/p diverting colostomy and mucus fistula in January and May 2019.  Colonoscopy was attempted but unable to biopsy the primary site or reach it.  \par \par FH as below\par \par Pt h/o colonoscopy 10 years ago that had polyps but no follow up colonoscopy was performed \par \par started on FOLFOX with palliative intent on [place] 10, 2019\par  [de-identified] : Yobani comes in for follow up while on FOLFOX and Vectibix and states as of yesterday, his ear discharge is resolved.  He states he was prescribed two different antibiotic ear drops for the issues.  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  He presented with a crutch today due to gout flare up in his R knee (started on Colchicine).  He weight and appetite are stable and rash is manageable. He denies n/v/d/c, pain, fevers/chills.  \par \par \par \par  [FreeTextEntry1] : Palliative FOLFOX (Vectibix added cycle 2) s/p cycle 8 (10/9/19)

## 2019-10-25 NOTE — PHYSICAL EXAM
[Restricted in physically strenuous activity but ambulatory and able to carry out work of a light or sedentary nature] : Status 1- Restricted in physically strenuous activity but ambulatory and able to carry out work of a light or sedentary nature, e.g., light house work, office work [Normal] : grossly intact [de-identified] : port   [de-identified] : R ear with effusion improved and no pus [de-identified] : ostomy , mucus fistula,  [de-identified] : R knee pain/gout

## 2019-10-25 NOTE — REVIEW OF SYSTEMS
[Fatigue] : fatigue [Skin Rash] : skin rash [Negative] : Allergic/Immunologic [Mucosal Pain] : no mucosal pain [Recent Change In Weight] : ~T no recent weight change [Chest Pain] : no chest pain [Abdominal Pain] : no abdominal pain [Lower Ext Edema] : no lower extremity edema [Diarrhea] : no diarrhea [Vomiting] : no vomiting [Constipation] : no constipation [FreeTextEntry4] : drainage from R ear resolved

## 2019-11-13 NOTE — ASU PATIENT PROFILE, ADULT - NS TRANSFER PATIENT BELONGINGS
Quality 226: Preventive Care And Screening: Tobacco Use: Screening And Cessation Intervention: Patient screened for tobacco use, is a smoker AND received Cessation Counseling
Detail Level: Detailed
Clothing

## 2019-12-16 NOTE — REASON FOR VISIT
[Initial Evaluation] : an initial evaluation for [Other: _____] : [unfilled] [FreeTextEntry2] : right ear infection

## 2019-12-16 NOTE — HISTORY OF PRESENT ILLNESS
[de-identified] : 74 year old male presents for right ear infection about a month ago that has resolved with use of ear drops, seen by urgent care. Denies otalgia, otorrhea. Reports intermittent tinnitus. Also has runny nose past week no purulence occasional blood tinged

## 2019-12-17 NOTE — PHYSICAL EXAM
[Restricted in physically strenuous activity but ambulatory and able to carry out work of a light or sedentary nature] : Status 1- Restricted in physically strenuous activity but ambulatory and able to carry out work of a light or sedentary nature, e.g., light house work, office work [Normal] : normal appearance, no rash, nodules, vesicles, ulcers, erythema [de-identified] : port   [de-identified] : R ear with effusion improved and no pus [de-identified] : dry skin [de-identified] : R knee pain/gout [de-identified] : ostomy , mucus fistula,

## 2019-12-17 NOTE — REVIEW OF SYSTEMS
[Fatigue] : fatigue [Skin Rash] : skin rash [Negative] : Allergic/Immunologic [Recent Change In Weight] : ~T no recent weight change [Mucosal Pain] : no mucosal pain [Chest Pain] : no chest pain [Abdominal Pain] : no abdominal pain [Lower Ext Edema] : no lower extremity edema [Vomiting] : no vomiting [Constipation] : no constipation [FreeTextEntry4] : drainage from R ear resolved  [Diarrhea] : no diarrhea

## 2019-12-17 NOTE — HISTORY OF PRESENT ILLNESS
[M: ___] : M[unfilled] [Disease: _____________________] : Disease: [unfilled] [AJCC Stage: ____] : AJCC Stage: [unfilled] [de-identified] : Yobani is a 75 y/o male diagnosed with metastatic GI adenocarcinoma of descending colon presumed s/p diverting colostomy and mucus fistula in January and May 2019.  Colonoscopy was attempted but unable to biopsy the primary site or reach it.  \par \par FH as below\par \par Pt h/o colonoscopy 10 years ago that had polyps but no follow up colonoscopy was performed \par \par started on FOLFOX with palliative intent on [place] 10, 2019\par  [de-identified] : CEA  [de-identified] : pan-edith wild type and left side primary\par \par  [FreeTextEntry1] : Palliative FOLFOX (Vectibix added cycle 2) s/p cycle 11 [de-identified] : Yobani comes in for follow up while on FOLFOX and Vectibix and denies n/v/d/c, CP, palpitations.  He no longer has the drainage from the L ear.  He has persistent peripheral neuropathy in fingertips/toes and using skin care for itchy rash related to Vectibix.  He has increased his Mag to BID and is planned to see ENT 12/16/19 \par \par

## 2019-12-23 NOTE — REVIEW OF SYSTEMS
[Fatigue] : fatigue [Skin Rash] : skin rash [Negative] : Allergic/Immunologic [Recent Change In Weight] : ~T no recent weight change [Chest Pain] : no chest pain [Mucosal Pain] : no mucosal pain [Lower Ext Edema] : no lower extremity edema [Abdominal Pain] : no abdominal pain [Vomiting] : no vomiting [Constipation] : no constipation [Diarrhea] : no diarrhea

## 2019-12-23 NOTE — HISTORY OF PRESENT ILLNESS
[Disease: _____________________] : Disease: [unfilled] [AJCC Stage: ____] : AJCC Stage: [unfilled] [M: ___] : M[unfilled] [de-identified] : Yobani is a 73 y/o male diagnosed with metastatic GI adenocarcinoma of descending colon presumed s/p diverting colostomy and mucus fistula in January and May 2019.  Colonoscopy was attempted but unable to biopsy the primary site or reach it.  He feels well overall and denies any new complaints. He denies n/v/d/c, fatigue, CP, abdominal pain, palpitations, LOPEZ.  His neuropathy persists is more numbness/mild pain but does not interfere with ADLs.  He is planning to see Dr Jones to discuss reversal soon.  \par \par  [FreeTextEntry1] : Palliative FOLFOX (Vectibix added cycle 2) - Oxaliplatin held after cycle 8 --->  s/p cycle 13 [de-identified] : pan-edith wild type and left side primary\par \par  [de-identified] : CEA  [de-identified] : Yobani comes in for follow up while on 5FU and Vectibix  and had scans 12/13/19 which show stable/improved disease.  He denies n/v/d/c, CP, palpitations, fatigued and rash is in control.  He is seeing Dr Jones 12/31 and plans to discuss ostomy reversal. He states his neuropathy is stable and more numbness than pain.

## 2019-12-23 NOTE — PHYSICAL EXAM
[Restricted in physically strenuous activity but ambulatory and able to carry out work of a light or sedentary nature] : Status 1- Restricted in physically strenuous activity but ambulatory and able to carry out work of a light or sedentary nature, e.g., light house work, office work [Normal] : affect appropriate [de-identified] : port   [de-identified] : R knee pain [de-identified] : dry skin [de-identified] : ostomy , mucus fistula,

## 2019-12-23 NOTE — RESULTS/DATA
[FreeTextEntry1] : EXAM: CT CHEST IC   EXAM: CT ABDOMEN AND PELVIS OC IC    PROCEDURE DATE: 12/13/2019     INTERPRETATION: CLINICAL INFORMATION: Colon cancer   COMPARISON: CT chest abdomen pelvis 9/6/2019   PROCEDURE:  CT of the Chest, Abdomen and Pelvis was performed with intravenous contrast.  Intravenous contrast: 90 ml Omnipaque 350. 10 ml discarded.  Oral contrast: Positive contrast was administered.  Sagittal and coronal reformats were performed.   FINDINGS:   CHEST:   LUNGS AND LARGE AIRWAYS: Patent central airways. No pulmonary nodules.  PLEURA: No pleural effusion.  VESSELS: Mild coronary artery calcifications  HEART: Heart size is normal. No pericardial effusion.  MEDIASTINUM AND KOURTNEY: No lymphadenopathy.  CHEST WALL AND LOWER NECK: Right-sided Mediport with tip at atriocaval  junction.   ABDOMEN AND PELVIS:   LIVER: Left hepatic lobe cyst and tiny hypodensity in the caudate lobe too  small to characterize, both unchanged  BILE DUCTS: Normal caliber.  GALLBLADDER: Within normal limits.  SPLEEN: Within normal limits.  PANCREAS: Diffuse fatty infiltration  ADRENALS: Within normal limits.  KIDNEYS/URETERS: Bilateral renal cysts and cortical hypodensities too small  to characterize.   BLADDER: Within normal limits.  REPRODUCTIVE ORGANS: Within normal limits   BOWEL: Status post colostomy and mucous fistula. Sigmoid/rectal thickening  is again noted not significantly changed from the previous exam. Normal  appendix.  PERITONEUM No ascites.  VESSELS: Within normal limits.  RETROPERITONEUM/LYMPH NODES:  A lymph node anterior to the aorta, series 2 image 106 today measures 0.3 x  0.7 cm previously 1.2 x 0.7 cm. image 106.  The previously described right paracolic gutter lymph node measuring 1.0 x  0.3 cm has resolved.  The previously described 2.4 x 1.6 cm perisigmoid lymph node today measures  1.4 x 1.3 cm.  ABDOMINAL WALL: Within normal limits.  BONES: Within normal limits.   IMPRESSION:   Sigmoid/rectal thickening is again noted not significantly changed from the  previous exam.   Improvement in previously described abdominal and pelvic lymph nodes as  described above.          SUZIE WHITTINGTON M.D., ATTENDING RADIOLOGIST  This document has been electronically signed. Dec 16 2019 9:48AM

## 2019-12-31 NOTE — HISTORY OF PRESENT ILLNESS
[FreeTextEntry1] : 74-year-old male with history of stage IV colon cancer with metastases and the peritoneal cavity.  Patient currently progressing well. Tolerating diet gaining weight. Stoma functioning without event. No fevers or chills. Currently on systemic chemotherapy.  Stable CEA level recent imaging shows stable disease. No aggravating factors no fevers or chills

## 2019-12-31 NOTE — ASSESSMENT
[FreeTextEntry1] : Stage IV colon cancer\par -Continue systemic therapy\par -We'll discuss with oncology\par -Will present at tumor board for additional treatment options including Tumor resection and possible intraperitoneal chemotherapy

## 2020-01-01 ENCOUNTER — APPOINTMENT (OUTPATIENT)
Dept: HOME HEALTH SERVICES | Facility: HOME HEALTH | Age: 75
End: 2020-01-01

## 2020-01-01 ENCOUNTER — OUTPATIENT (OUTPATIENT)
Dept: OUTPATIENT SERVICES | Facility: HOSPITAL | Age: 75
LOS: 1 days | End: 2020-01-01
Payer: MEDICARE

## 2020-01-01 ENCOUNTER — INPATIENT (INPATIENT)
Facility: HOSPITAL | Age: 75
LOS: 11 days | Discharge: HOME HEALTH | End: 2020-04-30
Attending: INTERNAL MEDICINE | Admitting: INTERNAL MEDICINE
Payer: MEDICARE

## 2020-01-01 ENCOUNTER — LABORATORY RESULT (OUTPATIENT)
Age: 75
End: 2020-01-01

## 2020-01-01 ENCOUNTER — APPOINTMENT (OUTPATIENT)
Dept: INFUSION THERAPY | Facility: HOSPITAL | Age: 75
End: 2020-01-01

## 2020-01-01 ENCOUNTER — TRANSCRIPTION ENCOUNTER (OUTPATIENT)
Age: 75
End: 2020-01-01

## 2020-01-01 ENCOUNTER — OUTPATIENT (OUTPATIENT)
Dept: OUTPATIENT SERVICES | Facility: HOSPITAL | Age: 75
LOS: 1 days | Discharge: HOME | End: 2020-01-01

## 2020-01-01 ENCOUNTER — APPOINTMENT (OUTPATIENT)
Dept: INFECTIOUS DISEASE | Facility: CLINIC | Age: 75
End: 2020-01-01
Payer: MEDICARE

## 2020-01-01 ENCOUNTER — APPOINTMENT (OUTPATIENT)
Dept: COLORECTAL SURGERY | Facility: CLINIC | Age: 75
End: 2020-01-01
Payer: MEDICARE

## 2020-01-01 ENCOUNTER — APPOINTMENT (OUTPATIENT)
Dept: HEMATOLOGY ONCOLOGY | Facility: CLINIC | Age: 75
End: 2020-01-01

## 2020-01-01 ENCOUNTER — APPOINTMENT (OUTPATIENT)
Dept: HEMATOLOGY ONCOLOGY | Facility: CLINIC | Age: 75
End: 2020-01-01
Payer: MEDICARE

## 2020-01-01 ENCOUNTER — INPATIENT (INPATIENT)
Facility: HOSPITAL | Age: 75
LOS: 4 days | Discharge: HOME | End: 2020-04-07
Attending: HOSPITALIST | Admitting: HOSPITALIST
Payer: MEDICARE

## 2020-01-01 ENCOUNTER — APPOINTMENT (OUTPATIENT)
Dept: CT IMAGING | Facility: IMAGING CENTER | Age: 75
End: 2020-01-01

## 2020-01-01 ENCOUNTER — RESULT REVIEW (OUTPATIENT)
Age: 75
End: 2020-01-01

## 2020-01-01 ENCOUNTER — APPOINTMENT (OUTPATIENT)
Dept: HOME HEALTH SERVICES | Facility: HOME HEALTH | Age: 75
End: 2020-01-01
Payer: MEDICARE

## 2020-01-01 ENCOUNTER — APPOINTMENT (OUTPATIENT)
Dept: COLORECTAL SURGERY | Facility: HOSPITAL | Age: 75
End: 2020-01-01

## 2020-01-01 ENCOUNTER — OUTPATIENT (OUTPATIENT)
Dept: OUTPATIENT SERVICES | Facility: HOSPITAL | Age: 75
LOS: 1 days | End: 2020-01-01

## 2020-01-01 ENCOUNTER — INPATIENT (INPATIENT)
Facility: HOSPITAL | Age: 75
LOS: 2 days | Discharge: HOME | End: 2020-03-12
Attending: STUDENT IN AN ORGANIZED HEALTH CARE EDUCATION/TRAINING PROGRAM | Admitting: STUDENT IN AN ORGANIZED HEALTH CARE EDUCATION/TRAINING PROGRAM
Payer: MEDICARE

## 2020-01-01 VITALS
SYSTOLIC BLOOD PRESSURE: 138 MMHG | OXYGEN SATURATION: 100 % | HEART RATE: 112 BPM | DIASTOLIC BLOOD PRESSURE: 42 MMHG | RESPIRATION RATE: 18 BRPM | TEMPERATURE: 98 F

## 2020-01-01 VITALS
TEMPERATURE: 98 F | RESPIRATION RATE: 17 BRPM | DIASTOLIC BLOOD PRESSURE: 91 MMHG | OXYGEN SATURATION: 98 % | HEART RATE: 110 BPM | SYSTOLIC BLOOD PRESSURE: 150 MMHG

## 2020-01-01 VITALS
DIASTOLIC BLOOD PRESSURE: 53 MMHG | TEMPERATURE: 98 F | HEART RATE: 120 BPM | RESPIRATION RATE: 24 BRPM | SYSTOLIC BLOOD PRESSURE: 113 MMHG | OXYGEN SATURATION: 92 %

## 2020-01-01 VITALS — TEMPERATURE: 98.4 F | DIASTOLIC BLOOD PRESSURE: 90 MMHG | SYSTOLIC BLOOD PRESSURE: 161 MMHG

## 2020-01-01 VITALS
RESPIRATION RATE: 16 BRPM | HEIGHT: 62 IN | WEIGHT: 121.92 LBS | OXYGEN SATURATION: 100 % | TEMPERATURE: 98 F | DIASTOLIC BLOOD PRESSURE: 82 MMHG | HEART RATE: 84 BPM | SYSTOLIC BLOOD PRESSURE: 140 MMHG

## 2020-01-01 VITALS
DIASTOLIC BLOOD PRESSURE: 70 MMHG | BODY MASS INDEX: 23.74 KG/M2 | SYSTOLIC BLOOD PRESSURE: 130 MMHG | WEIGHT: 125.66 LBS | OXYGEN SATURATION: 99 % | HEART RATE: 80 BPM | TEMPERATURE: 98.2 F | RESPIRATION RATE: 14 BRPM

## 2020-01-01 VITALS
HEART RATE: 76 BPM | DIASTOLIC BLOOD PRESSURE: 78 MMHG | HEIGHT: 61.5 IN | SYSTOLIC BLOOD PRESSURE: 146 MMHG | WEIGHT: 121.92 LBS | TEMPERATURE: 98 F | OXYGEN SATURATION: 100 %

## 2020-01-01 VITALS
HEART RATE: 98 BPM | RESPIRATION RATE: 18 BRPM | OXYGEN SATURATION: 97 % | DIASTOLIC BLOOD PRESSURE: 95 MMHG | TEMPERATURE: 98 F | SYSTOLIC BLOOD PRESSURE: 151 MMHG

## 2020-01-01 VITALS
RESPIRATION RATE: 18 BRPM | DIASTOLIC BLOOD PRESSURE: 85 MMHG | SYSTOLIC BLOOD PRESSURE: 157 MMHG | OXYGEN SATURATION: 99 % | TEMPERATURE: 98 F | HEART RATE: 96 BPM

## 2020-01-01 DIAGNOSIS — C20 MALIGNANT NEOPLASM OF RECTUM: ICD-10-CM

## 2020-01-01 DIAGNOSIS — L27.0 GENERALIZED SKIN ERUPTION DUE TO DRUGS AND MEDICAMENTS TAKEN INTERNALLY: ICD-10-CM

## 2020-01-01 DIAGNOSIS — Z90.5 ACQUIRED ABSENCE OF KIDNEY: Chronic | ICD-10-CM

## 2020-01-01 DIAGNOSIS — Z90.79 ACQUIRED ABSENCE OF OTHER GENITAL ORGAN(S): Chronic | ICD-10-CM

## 2020-01-01 DIAGNOSIS — Z87.2 PERSONAL HISTORY OF DISEASES OF THE SKIN AND SUBCUTANEOUS TISSUE: ICD-10-CM

## 2020-01-01 DIAGNOSIS — H92.11 OTORRHEA, RIGHT EAR: ICD-10-CM

## 2020-01-01 DIAGNOSIS — J06.9 ACUTE UPPER RESPIRATORY INFECTION, UNSPECIFIED: ICD-10-CM

## 2020-01-01 DIAGNOSIS — R53.1 WEAKNESS: ICD-10-CM

## 2020-01-01 DIAGNOSIS — E87.1 HYPO-OSMOLALITY AND HYPONATREMIA: ICD-10-CM

## 2020-01-01 DIAGNOSIS — C80.1 MALIGNANT (PRIMARY) NEOPLASM, UNSPECIFIED: ICD-10-CM

## 2020-01-01 DIAGNOSIS — K56.609 UNSPECIFIED INTESTINAL OBSTRUCTION, UNSPECIFIED AS TO PARTIAL VERSUS COMPLETE OBSTRUCTION: Chronic | ICD-10-CM

## 2020-01-01 DIAGNOSIS — C18.9 MALIGNANT NEOPLASM OF COLON, UNSPECIFIED: ICD-10-CM

## 2020-01-01 DIAGNOSIS — I10 ESSENTIAL (PRIMARY) HYPERTENSION: ICD-10-CM

## 2020-01-01 DIAGNOSIS — R73.03 PREDIABETES.: ICD-10-CM

## 2020-01-01 DIAGNOSIS — Z71.89 OTHER SPECIFIED COUNSELING: ICD-10-CM

## 2020-01-01 DIAGNOSIS — C61 MALIGNANT NEOPLASM OF PROSTATE: ICD-10-CM

## 2020-01-01 DIAGNOSIS — K65.1 PERITONEAL ABSCESS: ICD-10-CM

## 2020-01-01 DIAGNOSIS — Z91.040 LATEX ALLERGY STATUS: ICD-10-CM

## 2020-01-01 DIAGNOSIS — L85.3 XEROSIS CUTIS: ICD-10-CM

## 2020-01-01 DIAGNOSIS — K57.90 DIVERTICULOSIS OF INTESTINE, PART UNSPECIFIED, W/OUT PERFORATION OR ABSCESS W/OUT BLEEDING: ICD-10-CM

## 2020-01-01 DIAGNOSIS — K52.9 NONINFECTIVE GASTROENTERITIS AND COLITIS, UNSPECIFIED: ICD-10-CM

## 2020-01-01 DIAGNOSIS — E87.6 HYPOKALEMIA: ICD-10-CM

## 2020-01-01 DIAGNOSIS — E11.9 TYPE 2 DIABETES MELLITUS WITHOUT COMPLICATIONS: ICD-10-CM

## 2020-01-01 DIAGNOSIS — E78.5 HYPERLIPIDEMIA, UNSPECIFIED: ICD-10-CM

## 2020-01-01 DIAGNOSIS — Z98.890 OTHER SPECIFIED POSTPROCEDURAL STATES: Chronic | ICD-10-CM

## 2020-01-01 DIAGNOSIS — Z85.038 PERSONAL HISTORY OF OTHER MALIGNANT NEOPLASM OF LARGE INTESTINE: ICD-10-CM

## 2020-01-01 DIAGNOSIS — Z92.21 PERSONAL HISTORY OF ANTINEOPLASTIC CHEMOTHERAPY: Chronic | ICD-10-CM

## 2020-01-01 DIAGNOSIS — E83.42 HYPOMAGNESEMIA: ICD-10-CM

## 2020-01-01 DIAGNOSIS — Z87.19 PERSONAL HISTORY OF OTHER DISEASES OF THE DIGESTIVE SYSTEM: ICD-10-CM

## 2020-01-01 DIAGNOSIS — N17.9 ACUTE KIDNEY FAILURE, UNSPECIFIED: ICD-10-CM

## 2020-01-01 DIAGNOSIS — Z29.9 ENCOUNTER FOR PROPHYLACTIC MEASURES, UNSPECIFIED: ICD-10-CM

## 2020-01-01 DIAGNOSIS — R62.7 ADULT FAILURE TO THRIVE: ICD-10-CM

## 2020-01-01 DIAGNOSIS — J96.91 RESPIRATORY FAILURE, UNSPECIFIED WITH HYPOXIA: ICD-10-CM

## 2020-01-01 DIAGNOSIS — K56.609 UNSPECIFIED INTESTINAL OBSTRUCTION, UNSPECIFIED AS TO PARTIAL VERSUS COMPLETE OBSTRUCTION: ICD-10-CM

## 2020-01-01 DIAGNOSIS — E22.2 SYNDROME OF INAPPROPRIATE SECRETION OF ANTIDIURETIC HORMONE: ICD-10-CM

## 2020-01-01 DIAGNOSIS — U07.1 COVID-19: ICD-10-CM

## 2020-01-01 DIAGNOSIS — I70.0 ATHEROSCLEROSIS OF AORTA: ICD-10-CM

## 2020-01-01 DIAGNOSIS — Z02.9 ENCOUNTER FOR ADMINISTRATIVE EXAMINATIONS, UNSPECIFIED: ICD-10-CM

## 2020-01-01 LAB
ALBUMIN SERPL ELPH-MCNC: 2.6 G/DL — LOW (ref 3.3–5)
ALBUMIN SERPL ELPH-MCNC: 3 G/DL — LOW (ref 3.3–5)
ALBUMIN SERPL ELPH-MCNC: 3.1 G/DL — LOW (ref 3.3–5)
ALBUMIN SERPL ELPH-MCNC: 3.2 G/DL — LOW (ref 3.3–5)
ALBUMIN SERPL ELPH-MCNC: 3.5 G/DL
ALBUMIN SERPL ELPH-MCNC: 3.6 G/DL — SIGNIFICANT CHANGE UP (ref 3.3–5)
ALBUMIN SERPL ELPH-MCNC: 3.8 G/DL
ALBUMIN SERPL ELPH-MCNC: 4.2 G/DL — SIGNIFICANT CHANGE UP (ref 3.3–5)
ALP BLD-CCNC: 123 U/L
ALP BLD-CCNC: 186 U/L
ALP SERPL-CCNC: 162 U/L — HIGH (ref 40–120)
ALP SERPL-CCNC: 192 U/L — HIGH (ref 40–120)
ALP SERPL-CCNC: 60 U/L — SIGNIFICANT CHANGE UP (ref 40–120)
ALP SERPL-CCNC: 62 U/L — SIGNIFICANT CHANGE UP (ref 40–120)
ALP SERPL-CCNC: 71 U/L — SIGNIFICANT CHANGE UP (ref 40–120)
ALP SERPL-CCNC: 82 U/L — SIGNIFICANT CHANGE UP (ref 40–120)
ALT FLD-CCNC: 17 U/L — SIGNIFICANT CHANGE UP (ref 4–41)
ALT FLD-CCNC: 19 U/L — SIGNIFICANT CHANGE UP (ref 4–41)
ALT FLD-CCNC: 21 U/L — SIGNIFICANT CHANGE UP (ref 4–41)
ALT FLD-CCNC: 26 U/L — SIGNIFICANT CHANGE UP (ref 4–41)
ALT FLD-CCNC: 30 U/L — SIGNIFICANT CHANGE UP (ref 4–41)
ALT FLD-CCNC: 40 U/L — SIGNIFICANT CHANGE UP (ref 4–41)
ALT SERPL-CCNC: 44 U/L
ALT SERPL-CCNC: 47 U/L
ANION GAP SERPL CALC-SCNC: 11 MMO/L — SIGNIFICANT CHANGE UP (ref 7–14)
ANION GAP SERPL CALC-SCNC: 12 MMO/L — SIGNIFICANT CHANGE UP (ref 7–14)
ANION GAP SERPL CALC-SCNC: 13 MMO/L — SIGNIFICANT CHANGE UP (ref 7–14)
ANION GAP SERPL CALC-SCNC: 13 MMO/L — SIGNIFICANT CHANGE UP (ref 7–14)
ANION GAP SERPL CALC-SCNC: 14 MMO/L — SIGNIFICANT CHANGE UP (ref 7–14)
ANION GAP SERPL CALC-SCNC: 15 MMO/L — HIGH (ref 7–14)
ANION GAP SERPL CALC-SCNC: 16 MMO/L — HIGH (ref 7–14)
ANION GAP SERPL CALC-SCNC: 16 MMOL/L
ANION GAP SERPL CALC-SCNC: 17 MMOL/L
ANION GAP SERPL CALC-SCNC: 18 MMO/L — HIGH (ref 7–14)
ANION GAP SERPL CALC-SCNC: 18 MMO/L — HIGH (ref 7–14)
APPEARANCE UR: CLEAR — SIGNIFICANT CHANGE UP
APTT BLD: 26.4 SEC — LOW (ref 27.5–36.3)
APTT BLD: 28.2 SEC — SIGNIFICANT CHANGE UP (ref 27.5–36.3)
APTT BLD: 28.3 SEC — SIGNIFICANT CHANGE UP (ref 27.5–36.3)
AST SERPL-CCNC: 16 U/L — SIGNIFICANT CHANGE UP (ref 4–40)
AST SERPL-CCNC: 16 U/L — SIGNIFICANT CHANGE UP (ref 4–40)
AST SERPL-CCNC: 17 U/L — SIGNIFICANT CHANGE UP (ref 4–40)
AST SERPL-CCNC: 28 U/L — SIGNIFICANT CHANGE UP (ref 4–40)
AST SERPL-CCNC: 31 U/L — SIGNIFICANT CHANGE UP (ref 4–40)
AST SERPL-CCNC: 39 U/L — SIGNIFICANT CHANGE UP (ref 4–40)
AST SERPL-CCNC: 43 U/L
AST SERPL-CCNC: 49 U/L
BACTERIA # UR AUTO: NEGATIVE — SIGNIFICANT CHANGE UP
BASE EXCESS BLDA CALC-SCNC: 0.3 MMOL/L — SIGNIFICANT CHANGE UP
BASE EXCESS BLDV CALC-SCNC: 1.3 MMOL/L — SIGNIFICANT CHANGE UP
BASE EXCESS BLDV CALC-SCNC: 2.8 MMOL/L — SIGNIFICANT CHANGE UP
BASOPHILS # BLD AUTO: 0 K/UL — SIGNIFICANT CHANGE UP (ref 0–0.2)
BASOPHILS # BLD AUTO: 0.01 K/UL
BASOPHILS # BLD AUTO: 0.01 K/UL — SIGNIFICANT CHANGE UP (ref 0–0.2)
BASOPHILS # BLD AUTO: 0.02 K/UL — SIGNIFICANT CHANGE UP (ref 0–0.2)
BASOPHILS # BLD AUTO: 0.03 K/UL
BASOPHILS # BLD AUTO: 0.03 K/UL
BASOPHILS # BLD AUTO: 0.04 K/UL — SIGNIFICANT CHANGE UP (ref 0–0.2)
BASOPHILS # BLD AUTO: 0.1 K/UL — SIGNIFICANT CHANGE UP (ref 0–0.2)
BASOPHILS NFR BLD AUTO: 0.1 %
BASOPHILS NFR BLD AUTO: 0.1 % — SIGNIFICANT CHANGE UP (ref 0–2)
BASOPHILS NFR BLD AUTO: 0.1 % — SIGNIFICANT CHANGE UP (ref 0–2)
BASOPHILS NFR BLD AUTO: 0.2 % — SIGNIFICANT CHANGE UP (ref 0–2)
BASOPHILS NFR BLD AUTO: 0.4 %
BASOPHILS NFR BLD AUTO: 0.6 %
BASOPHILS NFR BLD AUTO: 0.6 % — SIGNIFICANT CHANGE UP (ref 0–2)
BASOPHILS NFR BLD AUTO: 0.6 % — SIGNIFICANT CHANGE UP (ref 0–2)
BASOPHILS NFR BLD AUTO: 0.8 % — SIGNIFICANT CHANGE UP (ref 0–2)
BASOPHILS NFR SPEC: 0 % — SIGNIFICANT CHANGE UP (ref 0–2)
BILIRUB SERPL-MCNC: 0.2 MG/DL
BILIRUB SERPL-MCNC: 0.3 MG/DL — SIGNIFICANT CHANGE UP (ref 0.2–1.2)
BILIRUB SERPL-MCNC: 0.3 MG/DL — SIGNIFICANT CHANGE UP (ref 0.2–1.2)
BILIRUB SERPL-MCNC: 0.4 MG/DL — SIGNIFICANT CHANGE UP (ref 0.2–1.2)
BILIRUB SERPL-MCNC: 0.5 MG/DL — SIGNIFICANT CHANGE UP (ref 0.2–1.2)
BILIRUB SERPL-MCNC: 0.7 MG/DL — SIGNIFICANT CHANGE UP (ref 0.2–1.2)
BILIRUB SERPL-MCNC: 0.7 MG/DL — SIGNIFICANT CHANGE UP (ref 0.2–1.2)
BILIRUB SERPL-MCNC: 1 MG/DL
BILIRUB UR-MCNC: NEGATIVE — SIGNIFICANT CHANGE UP
BLASTS # FLD: 0 % — SIGNIFICANT CHANGE UP (ref 0–0)
BLD GP AB SCN SERPL QL: NEGATIVE — SIGNIFICANT CHANGE UP
BLD GP AB SCN SERPL QL: NEGATIVE — SIGNIFICANT CHANGE UP
BLOOD GAS VENOUS - CREATININE: 0.9 MG/DL — SIGNIFICANT CHANGE UP (ref 0.5–1.3)
BLOOD UR QL VISUAL: SIGNIFICANT CHANGE UP
BUN SERPL-MCNC: 10 MG/DL — SIGNIFICANT CHANGE UP (ref 7–23)
BUN SERPL-MCNC: 17 MG/DL — SIGNIFICANT CHANGE UP (ref 7–23)
BUN SERPL-MCNC: 19 MG/DL
BUN SERPL-MCNC: 19 MG/DL — SIGNIFICANT CHANGE UP (ref 7–23)
BUN SERPL-MCNC: 20 MG/DL — SIGNIFICANT CHANGE UP (ref 7–23)
BUN SERPL-MCNC: 22 MG/DL — SIGNIFICANT CHANGE UP (ref 7–23)
BUN SERPL-MCNC: 23 MG/DL — SIGNIFICANT CHANGE UP (ref 7–23)
BUN SERPL-MCNC: 24 MG/DL — HIGH (ref 7–23)
BUN SERPL-MCNC: 25 MG/DL — HIGH (ref 7–23)
BUN SERPL-MCNC: 26 MG/DL
BUN SERPL-MCNC: 26 MG/DL — HIGH (ref 7–23)
BUN SERPL-MCNC: 26 MG/DL — HIGH (ref 7–23)
BUN SERPL-MCNC: 27 MG/DL — HIGH (ref 7–23)
BUN SERPL-MCNC: 28 MG/DL — HIGH (ref 7–23)
BUN SERPL-MCNC: 7 MG/DL — SIGNIFICANT CHANGE UP (ref 7–23)
BUN SERPL-MCNC: 9 MG/DL — SIGNIFICANT CHANGE UP (ref 7–23)
CA-I BLDA-SCNC: 1.19 MMOL/L — SIGNIFICANT CHANGE UP (ref 1.15–1.29)
CALCIUM SERPL-MCNC: 10.2 MG/DL — SIGNIFICANT CHANGE UP (ref 8.4–10.5)
CALCIUM SERPL-MCNC: 8.2 MG/DL — LOW (ref 8.4–10.5)
CALCIUM SERPL-MCNC: 8.4 MG/DL — SIGNIFICANT CHANGE UP (ref 8.4–10.5)
CALCIUM SERPL-MCNC: 8.5 MG/DL — SIGNIFICANT CHANGE UP (ref 8.4–10.5)
CALCIUM SERPL-MCNC: 8.5 MG/DL — SIGNIFICANT CHANGE UP (ref 8.4–10.5)
CALCIUM SERPL-MCNC: 8.6 MG/DL — SIGNIFICANT CHANGE UP (ref 8.4–10.5)
CALCIUM SERPL-MCNC: 8.7 MG/DL — SIGNIFICANT CHANGE UP (ref 8.4–10.5)
CALCIUM SERPL-MCNC: 8.7 MG/DL — SIGNIFICANT CHANGE UP (ref 8.4–10.5)
CALCIUM SERPL-MCNC: 8.8 MG/DL
CALCIUM SERPL-MCNC: 9 MG/DL — SIGNIFICANT CHANGE UP (ref 8.4–10.5)
CALCIUM SERPL-MCNC: 9.1 MG/DL
CALCIUM SERPL-MCNC: 9.1 MG/DL — SIGNIFICANT CHANGE UP (ref 8.4–10.5)
CALCIUM SERPL-MCNC: 9.1 MG/DL — SIGNIFICANT CHANGE UP (ref 8.4–10.5)
CALCIUM SERPL-MCNC: 9.2 MG/DL — SIGNIFICANT CHANGE UP (ref 8.4–10.5)
CALCIUM SERPL-MCNC: 9.3 MG/DL — SIGNIFICANT CHANGE UP (ref 8.4–10.5)
CALCIUM SERPL-MCNC: 9.4 MG/DL — SIGNIFICANT CHANGE UP (ref 8.4–10.5)
CALCIUM SERPL-MCNC: 9.6 MG/DL — SIGNIFICANT CHANGE UP (ref 8.4–10.5)
CALCIUM SERPL-MCNC: 9.7 MG/DL — SIGNIFICANT CHANGE UP (ref 8.4–10.5)
CALCIUM SERPL-MCNC: 9.8 MG/DL — SIGNIFICANT CHANGE UP (ref 8.4–10.5)
CALCIUM SERPL-MCNC: 9.9 MG/DL — SIGNIFICANT CHANGE UP (ref 8.4–10.5)
CEA SERPL-MCNC: 44.8 NG/ML
CHLORIDE BLDV-SCNC: 101 MMOL/L — SIGNIFICANT CHANGE UP (ref 96–108)
CHLORIDE BLDV-SCNC: 86 MMOL/L — LOW (ref 96–108)
CHLORIDE SERPL-SCNC: 101 MMOL/L — SIGNIFICANT CHANGE UP (ref 98–107)
CHLORIDE SERPL-SCNC: 102 MMOL/L — SIGNIFICANT CHANGE UP (ref 98–107)
CHLORIDE SERPL-SCNC: 102 MMOL/L — SIGNIFICANT CHANGE UP (ref 98–107)
CHLORIDE SERPL-SCNC: 104 MMOL/L — SIGNIFICANT CHANGE UP (ref 98–107)
CHLORIDE SERPL-SCNC: 106 MMOL/L — SIGNIFICANT CHANGE UP (ref 98–107)
CHLORIDE SERPL-SCNC: 107 MMOL/L — SIGNIFICANT CHANGE UP (ref 98–107)
CHLORIDE SERPL-SCNC: 80 MMOL/L — LOW (ref 98–107)
CHLORIDE SERPL-SCNC: 81 MMOL/L — LOW (ref 98–107)
CHLORIDE SERPL-SCNC: 83 MMOL/L
CHLORIDE SERPL-SCNC: 84 MMOL/L — LOW (ref 98–107)
CHLORIDE SERPL-SCNC: 87 MMOL/L — LOW (ref 98–107)
CHLORIDE SERPL-SCNC: 88 MMOL/L — LOW (ref 98–107)
CHLORIDE SERPL-SCNC: 89 MMOL/L — LOW (ref 98–107)
CHLORIDE SERPL-SCNC: 91 MMOL/L — LOW (ref 98–107)
CHLORIDE SERPL-SCNC: 91 MMOL/L — LOW (ref 98–107)
CHLORIDE SERPL-SCNC: 93 MMOL/L — LOW (ref 98–107)
CHLORIDE SERPL-SCNC: 95 MMOL/L — LOW (ref 98–107)
CHLORIDE SERPL-SCNC: 95 MMOL/L — LOW (ref 98–107)
CHLORIDE SERPL-SCNC: 97 MMOL/L — LOW (ref 98–107)
CHLORIDE SERPL-SCNC: 98 MMOL/L
CHLORIDE SERPL-SCNC: 98 MMOL/L — SIGNIFICANT CHANGE UP (ref 98–107)
CHLORIDE SERPL-SCNC: 98 MMOL/L — SIGNIFICANT CHANGE UP (ref 98–107)
CHLORIDE UR-SCNC: 27 MMOL/L — SIGNIFICANT CHANGE UP
CHLORIDE UR-SCNC: 81 MMOL/L — SIGNIFICANT CHANGE UP
CHOLEST SERPL-MCNC: 261 MG/DL — HIGH (ref 120–199)
CO2 SERPL-SCNC: 15 MMOL/L — LOW (ref 22–31)
CO2 SERPL-SCNC: 15 MMOL/L — LOW (ref 22–31)
CO2 SERPL-SCNC: 16 MMOL/L — LOW (ref 22–31)
CO2 SERPL-SCNC: 18 MMOL/L — LOW (ref 22–31)
CO2 SERPL-SCNC: 19 MMOL/L — LOW (ref 22–31)
CO2 SERPL-SCNC: 20 MMOL/L — LOW (ref 22–31)
CO2 SERPL-SCNC: 21 MMOL/L — LOW (ref 22–31)
CO2 SERPL-SCNC: 22 MMOL/L
CO2 SERPL-SCNC: 22 MMOL/L — SIGNIFICANT CHANGE UP (ref 22–31)
CO2 SERPL-SCNC: 23 MMOL/L
CO2 SERPL-SCNC: 23 MMOL/L — SIGNIFICANT CHANGE UP (ref 22–31)
CO2 SERPL-SCNC: 23 MMOL/L — SIGNIFICANT CHANGE UP (ref 22–31)
CO2 SERPL-SCNC: 25 MMOL/L — SIGNIFICANT CHANGE UP (ref 22–31)
CO2 SERPL-SCNC: 27 MMOL/L — SIGNIFICANT CHANGE UP (ref 22–31)
COLOR SPEC: SIGNIFICANT CHANGE UP
CORTIS SERPL-MCNC: 24.3 UG/DL — HIGH (ref 2.7–18.4)
CREAT ?TM UR-MCNC: 68.5 MG/DL — SIGNIFICANT CHANGE UP
CREAT SERPL-MCNC: 0.86 MG/DL — SIGNIFICANT CHANGE UP (ref 0.5–1.3)
CREAT SERPL-MCNC: 0.9 MG/DL — SIGNIFICANT CHANGE UP (ref 0.5–1.3)
CREAT SERPL-MCNC: 0.94 MG/DL — SIGNIFICANT CHANGE UP (ref 0.5–1.3)
CREAT SERPL-MCNC: 1.01 MG/DL — SIGNIFICANT CHANGE UP (ref 0.5–1.3)
CREAT SERPL-MCNC: 1.03 MG/DL — SIGNIFICANT CHANGE UP (ref 0.5–1.3)
CREAT SERPL-MCNC: 1.06 MG/DL — SIGNIFICANT CHANGE UP (ref 0.5–1.3)
CREAT SERPL-MCNC: 1.15 MG/DL — SIGNIFICANT CHANGE UP (ref 0.5–1.3)
CREAT SERPL-MCNC: 1.16 MG/DL — SIGNIFICANT CHANGE UP (ref 0.5–1.3)
CREAT SERPL-MCNC: 1.16 MG/DL — SIGNIFICANT CHANGE UP (ref 0.5–1.3)
CREAT SERPL-MCNC: 1.18 MG/DL — SIGNIFICANT CHANGE UP (ref 0.5–1.3)
CREAT SERPL-MCNC: 1.21 MG/DL
CREAT SERPL-MCNC: 1.21 MG/DL — SIGNIFICANT CHANGE UP (ref 0.5–1.3)
CREAT SERPL-MCNC: 1.22 MG/DL — SIGNIFICANT CHANGE UP (ref 0.5–1.3)
CREAT SERPL-MCNC: 1.29 MG/DL — SIGNIFICANT CHANGE UP (ref 0.5–1.3)
CREAT SERPL-MCNC: 1.29 MG/DL — SIGNIFICANT CHANGE UP (ref 0.5–1.3)
CREAT SERPL-MCNC: 1.31 MG/DL — HIGH (ref 0.5–1.3)
CREAT SERPL-MCNC: 1.32 MG/DL — HIGH (ref 0.5–1.3)
CREAT SERPL-MCNC: 1.33 MG/DL
CREAT SERPL-MCNC: 1.37 MG/DL — HIGH (ref 0.5–1.3)
CREAT SERPL-MCNC: 1.37 MG/DL — HIGH (ref 0.5–1.3)
CREAT SERPL-MCNC: 1.4 MG/DL — HIGH (ref 0.5–1.3)
CREAT SERPL-MCNC: 1.4 MG/DL — HIGH (ref 0.5–1.3)
CREAT SERPL-MCNC: 1.44 MG/DL — HIGH (ref 0.5–1.3)
CREAT SERPL-MCNC: 1.47 MG/DL — HIGH (ref 0.5–1.3)
CREAT SERPL-MCNC: 1.5 MG/DL — HIGH (ref 0.5–1.3)
CRP SERPL-MCNC: 29 MG/L — HIGH
CRP SERPL-MCNC: 50.9 MG/L — HIGH
CRP SERPL-MCNC: 53.5 MG/L — HIGH
CRP SERPL-MCNC: 72.9 MG/L — HIGH
CULTURE RESULTS: SIGNIFICANT CHANGE UP
CULTURE RESULTS: SIGNIFICANT CHANGE UP
D DIMER BLD IA.RAPID-MCNC: 563 NG/ML — SIGNIFICANT CHANGE UP
D DIMER BLD IA.RAPID-MCNC: 788 NG/ML — SIGNIFICANT CHANGE UP
D DIMER BLD IA.RAPID-MCNC: 946 NG/ML — SIGNIFICANT CHANGE UP
ELLIPTOCYTES BLD QL SMEAR: SLIGHT — SIGNIFICANT CHANGE UP
EOSINOPHIL # BLD AUTO: 0 K/UL
EOSINOPHIL # BLD AUTO: 0 K/UL — SIGNIFICANT CHANGE UP (ref 0–0.5)
EOSINOPHIL # BLD AUTO: 0.01 K/UL — SIGNIFICANT CHANGE UP (ref 0–0.5)
EOSINOPHIL # BLD AUTO: 0.03 K/UL — SIGNIFICANT CHANGE UP (ref 0–0.5)
EOSINOPHIL # BLD AUTO: 0.12 K/UL — SIGNIFICANT CHANGE UP (ref 0–0.5)
EOSINOPHIL # BLD AUTO: 0.17 K/UL
EOSINOPHIL # BLD AUTO: 0.34 K/UL
EOSINOPHIL # BLD AUTO: 0.44 K/UL — SIGNIFICANT CHANGE UP (ref 0–0.5)
EOSINOPHIL # BLD AUTO: 0.5 K/UL — SIGNIFICANT CHANGE UP (ref 0–0.5)
EOSINOPHIL # BLD AUTO: 0.7 K/UL — HIGH (ref 0–0.5)
EOSINOPHIL NFR BLD AUTO: 0 %
EOSINOPHIL NFR BLD AUTO: 0 % — SIGNIFICANT CHANGE UP (ref 0–6)
EOSINOPHIL NFR BLD AUTO: 0.1 % — SIGNIFICANT CHANGE UP (ref 0–6)
EOSINOPHIL NFR BLD AUTO: 0.3 % — SIGNIFICANT CHANGE UP (ref 0–6)
EOSINOPHIL NFR BLD AUTO: 1.3 % — SIGNIFICANT CHANGE UP (ref 0–6)
EOSINOPHIL NFR BLD AUTO: 3.1 %
EOSINOPHIL NFR BLD AUTO: 4.1 %
EOSINOPHIL NFR BLD AUTO: 6.6 % — HIGH (ref 0–6)
EOSINOPHIL NFR BLD AUTO: 6.9 % — HIGH (ref 0–6)
EOSINOPHIL NFR BLD AUTO: 8.6 % — HIGH (ref 0–6)
EOSINOPHIL NFR FLD: 0 % — SIGNIFICANT CHANGE UP (ref 0–6)
ESTIMATED AVERAGE GLUCOSE: 123 MG/DL
FERRITIN SERPL-MCNC: 677.5 NG/ML — HIGH (ref 30–400)
FERRITIN SERPL-MCNC: 845.4 NG/ML — HIGH (ref 30–400)
FERRITIN SERPL-MCNC: 855.1 NG/ML — HIGH (ref 30–400)
FERRITIN SERPL-MCNC: 880.7 NG/ML — HIGH (ref 30–400)
GAS PNL BLDV: 119 MMOL/L — CRITICAL LOW (ref 136–146)
GAS PNL BLDV: 131 MMOL/L — LOW (ref 136–146)
GIANT PLATELETS BLD QL SMEAR: PRESENT — SIGNIFICANT CHANGE UP
GLUCOSE BLDA-MCNC: 144 MG/DL — HIGH (ref 70–99)
GLUCOSE BLDC GLUCOMTR-MCNC: 100 MG/DL — HIGH (ref 70–99)
GLUCOSE BLDC GLUCOMTR-MCNC: 101 MG/DL — HIGH (ref 70–99)
GLUCOSE BLDC GLUCOMTR-MCNC: 101 MG/DL — HIGH (ref 70–99)
GLUCOSE BLDC GLUCOMTR-MCNC: 102 MG/DL — HIGH (ref 70–99)
GLUCOSE BLDC GLUCOMTR-MCNC: 103 MG/DL — HIGH (ref 70–99)
GLUCOSE BLDC GLUCOMTR-MCNC: 103 MG/DL — HIGH (ref 70–99)
GLUCOSE BLDC GLUCOMTR-MCNC: 104 MG/DL — HIGH (ref 70–99)
GLUCOSE BLDC GLUCOMTR-MCNC: 104 MG/DL — HIGH (ref 70–99)
GLUCOSE BLDC GLUCOMTR-MCNC: 105 MG/DL — HIGH (ref 70–99)
GLUCOSE BLDC GLUCOMTR-MCNC: 107 MG/DL — HIGH (ref 70–99)
GLUCOSE BLDC GLUCOMTR-MCNC: 109 MG/DL — HIGH (ref 70–99)
GLUCOSE BLDC GLUCOMTR-MCNC: 110 MG/DL — HIGH (ref 70–99)
GLUCOSE BLDC GLUCOMTR-MCNC: 111 MG/DL — HIGH (ref 70–99)
GLUCOSE BLDC GLUCOMTR-MCNC: 115 MG/DL — HIGH (ref 70–99)
GLUCOSE BLDC GLUCOMTR-MCNC: 120 MG/DL — HIGH (ref 70–99)
GLUCOSE BLDC GLUCOMTR-MCNC: 129 MG/DL — HIGH (ref 70–99)
GLUCOSE BLDC GLUCOMTR-MCNC: 132 MG/DL — HIGH (ref 70–99)
GLUCOSE BLDC GLUCOMTR-MCNC: 133 MG/DL — HIGH (ref 70–99)
GLUCOSE BLDC GLUCOMTR-MCNC: 145 MG/DL — HIGH (ref 70–99)
GLUCOSE BLDC GLUCOMTR-MCNC: 175 MG/DL — HIGH (ref 70–99)
GLUCOSE BLDC GLUCOMTR-MCNC: 72 MG/DL — SIGNIFICANT CHANGE UP (ref 70–99)
GLUCOSE BLDC GLUCOMTR-MCNC: 75 MG/DL — SIGNIFICANT CHANGE UP (ref 70–99)
GLUCOSE BLDC GLUCOMTR-MCNC: 78 MG/DL — SIGNIFICANT CHANGE UP (ref 70–99)
GLUCOSE BLDC GLUCOMTR-MCNC: 80 MG/DL — SIGNIFICANT CHANGE UP (ref 70–99)
GLUCOSE BLDC GLUCOMTR-MCNC: 80 MG/DL — SIGNIFICANT CHANGE UP (ref 70–99)
GLUCOSE BLDC GLUCOMTR-MCNC: 82 MG/DL — SIGNIFICANT CHANGE UP (ref 70–99)
GLUCOSE BLDC GLUCOMTR-MCNC: 83 MG/DL — SIGNIFICANT CHANGE UP (ref 70–99)
GLUCOSE BLDC GLUCOMTR-MCNC: 85 MG/DL — SIGNIFICANT CHANGE UP (ref 70–99)
GLUCOSE BLDC GLUCOMTR-MCNC: 85 MG/DL — SIGNIFICANT CHANGE UP (ref 70–99)
GLUCOSE BLDC GLUCOMTR-MCNC: 86 MG/DL — SIGNIFICANT CHANGE UP (ref 70–99)
GLUCOSE BLDC GLUCOMTR-MCNC: 88 MG/DL — SIGNIFICANT CHANGE UP (ref 70–99)
GLUCOSE BLDC GLUCOMTR-MCNC: 88 MG/DL — SIGNIFICANT CHANGE UP (ref 70–99)
GLUCOSE BLDC GLUCOMTR-MCNC: 89 MG/DL — SIGNIFICANT CHANGE UP (ref 70–99)
GLUCOSE BLDC GLUCOMTR-MCNC: 90 MG/DL — SIGNIFICANT CHANGE UP (ref 70–99)
GLUCOSE BLDC GLUCOMTR-MCNC: 90 MG/DL — SIGNIFICANT CHANGE UP (ref 70–99)
GLUCOSE BLDC GLUCOMTR-MCNC: 91 MG/DL — SIGNIFICANT CHANGE UP (ref 70–99)
GLUCOSE BLDC GLUCOMTR-MCNC: 91 MG/DL — SIGNIFICANT CHANGE UP (ref 70–99)
GLUCOSE BLDC GLUCOMTR-MCNC: 92 MG/DL — SIGNIFICANT CHANGE UP (ref 70–99)
GLUCOSE BLDC GLUCOMTR-MCNC: 92 MG/DL — SIGNIFICANT CHANGE UP (ref 70–99)
GLUCOSE BLDC GLUCOMTR-MCNC: 93 MG/DL — SIGNIFICANT CHANGE UP (ref 70–99)
GLUCOSE BLDC GLUCOMTR-MCNC: 93 MG/DL — SIGNIFICANT CHANGE UP (ref 70–99)
GLUCOSE BLDC GLUCOMTR-MCNC: 95 MG/DL — SIGNIFICANT CHANGE UP (ref 70–99)
GLUCOSE BLDC GLUCOMTR-MCNC: 96 MG/DL — SIGNIFICANT CHANGE UP (ref 70–99)
GLUCOSE BLDC GLUCOMTR-MCNC: 97 MG/DL — SIGNIFICANT CHANGE UP (ref 70–99)
GLUCOSE BLDC GLUCOMTR-MCNC: 98 MG/DL — SIGNIFICANT CHANGE UP (ref 70–99)
GLUCOSE BLDC GLUCOMTR-MCNC: 99 MG/DL — SIGNIFICANT CHANGE UP (ref 70–99)
GLUCOSE BLDV-MCNC: 111 MG/DL — HIGH (ref 70–99)
GLUCOSE BLDV-MCNC: 120 MG/DL — HIGH (ref 70–99)
GLUCOSE SERPL-MCNC: 100 MG/DL — HIGH (ref 70–99)
GLUCOSE SERPL-MCNC: 100 MG/DL — HIGH (ref 70–99)
GLUCOSE SERPL-MCNC: 101 MG/DL — HIGH (ref 70–99)
GLUCOSE SERPL-MCNC: 102 MG/DL — HIGH (ref 70–99)
GLUCOSE SERPL-MCNC: 103 MG/DL — HIGH (ref 70–99)
GLUCOSE SERPL-MCNC: 103 MG/DL — HIGH (ref 70–99)
GLUCOSE SERPL-MCNC: 107 MG/DL — HIGH (ref 70–99)
GLUCOSE SERPL-MCNC: 108 MG/DL — HIGH (ref 70–99)
GLUCOSE SERPL-MCNC: 108 MG/DL — HIGH (ref 70–99)
GLUCOSE SERPL-MCNC: 109 MG/DL — HIGH (ref 70–99)
GLUCOSE SERPL-MCNC: 110 MG/DL — HIGH (ref 70–99)
GLUCOSE SERPL-MCNC: 111 MG/DL
GLUCOSE SERPL-MCNC: 112 MG/DL — HIGH (ref 70–99)
GLUCOSE SERPL-MCNC: 113 MG/DL — HIGH (ref 70–99)
GLUCOSE SERPL-MCNC: 113 MG/DL — HIGH (ref 70–99)
GLUCOSE SERPL-MCNC: 116 MG/DL — HIGH (ref 70–99)
GLUCOSE SERPL-MCNC: 116 MG/DL — HIGH (ref 70–99)
GLUCOSE SERPL-MCNC: 120 MG/DL — HIGH (ref 70–99)
GLUCOSE SERPL-MCNC: 125 MG/DL — HIGH (ref 70–99)
GLUCOSE SERPL-MCNC: 163 MG/DL — HIGH (ref 70–99)
GLUCOSE SERPL-MCNC: 182 MG/DL — HIGH (ref 70–99)
GLUCOSE SERPL-MCNC: 207 MG/DL — HIGH (ref 70–99)
GLUCOSE SERPL-MCNC: 87 MG/DL — SIGNIFICANT CHANGE UP (ref 70–99)
GLUCOSE SERPL-MCNC: 88 MG/DL — SIGNIFICANT CHANGE UP (ref 70–99)
GLUCOSE SERPL-MCNC: 91 MG/DL — SIGNIFICANT CHANGE UP (ref 70–99)
GLUCOSE SERPL-MCNC: 96 MG/DL — SIGNIFICANT CHANGE UP (ref 70–99)
GLUCOSE SERPL-MCNC: 99 MG/DL — SIGNIFICANT CHANGE UP (ref 70–99)
GLUCOSE UR-MCNC: NEGATIVE — SIGNIFICANT CHANGE UP
HBA1C BLD-MCNC: 5.9 % — HIGH (ref 4–5.6)
HBA1C BLD-MCNC: 6 % — HIGH (ref 4–5.6)
HBA1C BLD-MCNC: 6 % — HIGH (ref 4–5.6)
HBA1C MFR BLD HPLC: 5.9 %
HCO3 BLDA-SCNC: 25 MMOL/L — SIGNIFICANT CHANGE UP (ref 22–26)
HCO3 BLDV-SCNC: 25 MMOL/L — SIGNIFICANT CHANGE UP (ref 20–27)
HCO3 BLDV-SCNC: 26 MMOL/L — SIGNIFICANT CHANGE UP (ref 20–27)
HCT VFR BLD CALC: 28.6 % — LOW (ref 39–50)
HCT VFR BLD CALC: 30.7 % — LOW (ref 39–50)
HCT VFR BLD CALC: 30.9 % — LOW (ref 39–50)
HCT VFR BLD CALC: 32.1 % — LOW (ref 39–50)
HCT VFR BLD CALC: 32.3 % — LOW (ref 39–50)
HCT VFR BLD CALC: 32.5 % — LOW (ref 39–50)
HCT VFR BLD CALC: 32.8 % — LOW (ref 39–50)
HCT VFR BLD CALC: 33.2 % — LOW (ref 39–50)
HCT VFR BLD CALC: 33.2 % — LOW (ref 39–50)
HCT VFR BLD CALC: 33.6 % — LOW (ref 39–50)
HCT VFR BLD CALC: 33.9 %
HCT VFR BLD CALC: 33.9 % — LOW (ref 39–50)
HCT VFR BLD CALC: 34.6 % — LOW (ref 39–50)
HCT VFR BLD CALC: 34.8 %
HCT VFR BLD CALC: 34.8 % — LOW (ref 39–50)
HCT VFR BLD CALC: 34.9 %
HCT VFR BLD CALC: 35.6 % — LOW (ref 39–50)
HCT VFR BLD CALC: 35.8 % — LOW (ref 39–50)
HCT VFR BLD CALC: 35.9 % — LOW (ref 39–50)
HCT VFR BLD CALC: 36.4 % — LOW (ref 39–50)
HCT VFR BLD CALC: 36.7 % — LOW (ref 39–50)
HCT VFR BLD CALC: 37.6 % — LOW (ref 39–50)
HCT VFR BLD CALC: 37.8 % — LOW (ref 39–50)
HCT VFR BLD CALC: 39.9 % — SIGNIFICANT CHANGE UP (ref 39–50)
HCT VFR BLD CALC: 40.3 % — SIGNIFICANT CHANGE UP (ref 39–50)
HCT VFR BLDA CALC: 32.9 % — LOW (ref 39–51)
HCT VFR BLDV CALC: 35.8 % — LOW (ref 39–51)
HCT VFR BLDV CALC: 36 % — LOW (ref 39–51)
HDLC SERPL-MCNC: 29 MG/DL — LOW (ref 35–55)
HGB BLD-MCNC: 10 G/DL — LOW (ref 13–17)
HGB BLD-MCNC: 10.1 G/DL — LOW (ref 13–17)
HGB BLD-MCNC: 10.5 G/DL — LOW (ref 13–17)
HGB BLD-MCNC: 10.6 G/DL — LOW (ref 13–17)
HGB BLD-MCNC: 10.7 G/DL — LOW (ref 13–17)
HGB BLD-MCNC: 10.8 G/DL
HGB BLD-MCNC: 10.9 G/DL — LOW (ref 13–17)
HGB BLD-MCNC: 11 G/DL
HGB BLD-MCNC: 11.3 G/DL — LOW (ref 13–17)
HGB BLD-MCNC: 11.3 G/DL — LOW (ref 13–17)
HGB BLD-MCNC: 11.4 G/DL
HGB BLD-MCNC: 11.4 G/DL — LOW (ref 13–17)
HGB BLD-MCNC: 11.5 G/DL — LOW (ref 13–17)
HGB BLD-MCNC: 11.6 G/DL — LOW (ref 13–17)
HGB BLD-MCNC: 11.7 G/DL — LOW (ref 13–17)
HGB BLD-MCNC: 11.8 G/DL — LOW (ref 13–17)
HGB BLD-MCNC: 11.9 G/DL — LOW (ref 13–17)
HGB BLD-MCNC: 12.2 G/DL — LOW (ref 13–17)
HGB BLD-MCNC: 12.2 G/DL — LOW (ref 13–17)
HGB BLD-MCNC: 13.2 G/DL — SIGNIFICANT CHANGE UP (ref 13–17)
HGB BLD-MCNC: 13.8 G/DL — SIGNIFICANT CHANGE UP (ref 13–17)
HGB BLDA-MCNC: 10.7 G/DL — LOW (ref 13–17)
HGB BLDV-MCNC: 11.6 G/DL — LOW (ref 13–17)
HGB BLDV-MCNC: 11.7 G/DL — LOW (ref 13–17)
HYALINE CASTS # UR AUTO: NEGATIVE — SIGNIFICANT CHANGE UP
IMM GRANULOCYTES NFR BLD AUTO: 0.3 % — SIGNIFICANT CHANGE UP (ref 0–1.5)
IMM GRANULOCYTES NFR BLD AUTO: 0.4 % — SIGNIFICANT CHANGE UP (ref 0–1.5)
IMM GRANULOCYTES NFR BLD AUTO: 0.4 % — SIGNIFICANT CHANGE UP (ref 0–1.5)
IMM GRANULOCYTES NFR BLD AUTO: 0.5 % — SIGNIFICANT CHANGE UP (ref 0–1.5)
IMM GRANULOCYTES NFR BLD AUTO: 0.6 %
IMM GRANULOCYTES NFR BLD AUTO: 0.6 % — SIGNIFICANT CHANGE UP (ref 0–1.5)
IMM GRANULOCYTES NFR BLD AUTO: 0.6 % — SIGNIFICANT CHANGE UP (ref 0–1.5)
IMM GRANULOCYTES NFR BLD AUTO: 0.7 %
IMM GRANULOCYTES NFR BLD AUTO: 0.7 % — SIGNIFICANT CHANGE UP (ref 0–1.5)
IMM GRANULOCYTES NFR BLD AUTO: 2.2 %
INR BLD: 1.1 — SIGNIFICANT CHANGE UP (ref 0.88–1.17)
INR BLD: 1.13 — SIGNIFICANT CHANGE UP (ref 0.88–1.17)
INR BLD: 1.19 — HIGH (ref 0.88–1.17)
KETONES UR-MCNC: NEGATIVE — SIGNIFICANT CHANGE UP
LACTATE BLDA-SCNC: 1.1 MMOL/L — SIGNIFICANT CHANGE UP (ref 0.5–2)
LACTATE BLDV-MCNC: 1.9 MMOL/L — SIGNIFICANT CHANGE UP (ref 0.5–2)
LACTATE BLDV-MCNC: 2.6 MMOL/L — HIGH (ref 0.5–2)
LDH SERPL L TO P-CCNC: 455 U/L — HIGH (ref 135–225)
LEUKOCYTE ESTERASE UR-ACNC: NEGATIVE — SIGNIFICANT CHANGE UP
LIPID PNL WITH DIRECT LDL SERPL: 202 MG/DL — SIGNIFICANT CHANGE UP
LYMPHOCYTES # BLD AUTO: 0.53 K/UL
LYMPHOCYTES # BLD AUTO: 0.64 K/UL
LYMPHOCYTES # BLD AUTO: 0.66 K/UL — LOW (ref 1–3.3)
LYMPHOCYTES # BLD AUTO: 0.74 K/UL — LOW (ref 1–3.3)
LYMPHOCYTES # BLD AUTO: 0.79 K/UL — LOW (ref 1–3.3)
LYMPHOCYTES # BLD AUTO: 0.85 K/UL — LOW (ref 1–3.3)
LYMPHOCYTES # BLD AUTO: 0.85 K/UL — LOW (ref 1–3.3)
LYMPHOCYTES # BLD AUTO: 0.87 K/UL — LOW (ref 1–3.3)
LYMPHOCYTES # BLD AUTO: 0.87 K/UL — LOW (ref 1–3.3)
LYMPHOCYTES # BLD AUTO: 0.89 K/UL
LYMPHOCYTES # BLD AUTO: 1.6 K/UL — SIGNIFICANT CHANGE UP (ref 1–3.3)
LYMPHOCYTES # BLD AUTO: 1.8 K/UL — SIGNIFICANT CHANGE UP (ref 1–3.3)
LYMPHOCYTES # BLD AUTO: 11.1 % — LOW (ref 13–44)
LYMPHOCYTES # BLD AUTO: 13.8 % — SIGNIFICANT CHANGE UP (ref 13–44)
LYMPHOCYTES # BLD AUTO: 15.9 % — SIGNIFICANT CHANGE UP (ref 13–44)
LYMPHOCYTES # BLD AUTO: 19.6 % — SIGNIFICANT CHANGE UP (ref 13–44)
LYMPHOCYTES # BLD AUTO: 22.5 % — SIGNIFICANT CHANGE UP (ref 13–44)
LYMPHOCYTES # BLD AUTO: 3.9 % — LOW (ref 13–44)
LYMPHOCYTES # BLD AUTO: 5.1 % — LOW (ref 13–44)
LYMPHOCYTES # BLD AUTO: 7.5 % — LOW (ref 13–44)
LYMPHOCYTES # BLD AUTO: 9.2 % — LOW (ref 13–44)
LYMPHOCYTES NFR BLD AUTO: 10.8 %
LYMPHOCYTES NFR BLD AUTO: 11.8 %
LYMPHOCYTES NFR BLD AUTO: 4.2 %
LYMPHOCYTES NFR SPEC AUTO: 9.6 % — LOW (ref 13–44)
MAGNESIUM SERPL-MCNC: 1.8 MG/DL — SIGNIFICANT CHANGE UP (ref 1.6–2.6)
MAGNESIUM SERPL-MCNC: 1.9 MG/DL — SIGNIFICANT CHANGE UP (ref 1.6–2.6)
MAGNESIUM SERPL-MCNC: 1.9 MG/DL — SIGNIFICANT CHANGE UP (ref 1.6–2.6)
MAGNESIUM SERPL-MCNC: 2 MG/DL — SIGNIFICANT CHANGE UP (ref 1.6–2.6)
MAGNESIUM SERPL-MCNC: 2.1 MG/DL — SIGNIFICANT CHANGE UP (ref 1.6–2.6)
MAGNESIUM SERPL-MCNC: 2.2 MG/DL — SIGNIFICANT CHANGE UP (ref 1.6–2.6)
MAGNESIUM SERPL-MCNC: 2.2 MG/DL — SIGNIFICANT CHANGE UP (ref 1.6–2.6)
MAGNESIUM SERPL-MCNC: 2.3 MG/DL — SIGNIFICANT CHANGE UP (ref 1.6–2.6)
MAN DIFF?: NORMAL
MCHC RBC-ENTMCNC: 28.5 PG — SIGNIFICANT CHANGE UP (ref 27–34)
MCHC RBC-ENTMCNC: 28.6 PG
MCHC RBC-ENTMCNC: 28.6 PG — SIGNIFICANT CHANGE UP (ref 27–34)
MCHC RBC-ENTMCNC: 29.1 PG — SIGNIFICANT CHANGE UP (ref 27–34)
MCHC RBC-ENTMCNC: 29.1 PG — SIGNIFICANT CHANGE UP (ref 27–34)
MCHC RBC-ENTMCNC: 29.3 PG
MCHC RBC-ENTMCNC: 29.6 PG — SIGNIFICANT CHANGE UP (ref 27–34)
MCHC RBC-ENTMCNC: 29.7 PG — SIGNIFICANT CHANGE UP (ref 27–34)
MCHC RBC-ENTMCNC: 29.9 PG — SIGNIFICANT CHANGE UP (ref 27–34)
MCHC RBC-ENTMCNC: 30.2 PG
MCHC RBC-ENTMCNC: 30.2 PG — SIGNIFICANT CHANGE UP (ref 27–34)
MCHC RBC-ENTMCNC: 30.4 PG — SIGNIFICANT CHANGE UP (ref 27–34)
MCHC RBC-ENTMCNC: 30.5 PG — SIGNIFICANT CHANGE UP (ref 27–34)
MCHC RBC-ENTMCNC: 30.6 PG — SIGNIFICANT CHANGE UP (ref 27–34)
MCHC RBC-ENTMCNC: 30.6 PG — SIGNIFICANT CHANGE UP (ref 27–34)
MCHC RBC-ENTMCNC: 30.7 PG — SIGNIFICANT CHANGE UP (ref 27–34)
MCHC RBC-ENTMCNC: 30.8 PG — SIGNIFICANT CHANGE UP (ref 27–34)
MCHC RBC-ENTMCNC: 31 GM/DL
MCHC RBC-ENTMCNC: 31 PG — SIGNIFICANT CHANGE UP (ref 27–34)
MCHC RBC-ENTMCNC: 31.3 PG — SIGNIFICANT CHANGE UP (ref 27–34)
MCHC RBC-ENTMCNC: 31.5 % — LOW (ref 32–36)
MCHC RBC-ENTMCNC: 31.5 GM/DL
MCHC RBC-ENTMCNC: 31.5 PG — SIGNIFICANT CHANGE UP (ref 27–34)
MCHC RBC-ENTMCNC: 31.6 % — LOW (ref 32–36)
MCHC RBC-ENTMCNC: 31.7 PG — SIGNIFICANT CHANGE UP (ref 27–34)
MCHC RBC-ENTMCNC: 32.1 % — SIGNIFICANT CHANGE UP (ref 32–36)
MCHC RBC-ENTMCNC: 32.2 % — SIGNIFICANT CHANGE UP (ref 32–36)
MCHC RBC-ENTMCNC: 32.3 % — SIGNIFICANT CHANGE UP (ref 32–36)
MCHC RBC-ENTMCNC: 32.4 % — SIGNIFICANT CHANGE UP (ref 32–36)
MCHC RBC-ENTMCNC: 32.7 % — SIGNIFICANT CHANGE UP (ref 32–36)
MCHC RBC-ENTMCNC: 32.8 % — SIGNIFICANT CHANGE UP (ref 32–36)
MCHC RBC-ENTMCNC: 32.8 % — SIGNIFICANT CHANGE UP (ref 32–36)
MCHC RBC-ENTMCNC: 32.9 % — SIGNIFICANT CHANGE UP (ref 32–36)
MCHC RBC-ENTMCNC: 33.1 % — SIGNIFICANT CHANGE UP (ref 32–36)
MCHC RBC-ENTMCNC: 33.1 % — SIGNIFICANT CHANGE UP (ref 32–36)
MCHC RBC-ENTMCNC: 33.2 G/DL — SIGNIFICANT CHANGE UP (ref 32–36)
MCHC RBC-ENTMCNC: 33.5 PG — SIGNIFICANT CHANGE UP (ref 27–34)
MCHC RBC-ENTMCNC: 33.6 % — SIGNIFICANT CHANGE UP (ref 32–36)
MCHC RBC-ENTMCNC: 33.6 GM/DL
MCHC RBC-ENTMCNC: 34 % — SIGNIFICANT CHANGE UP (ref 32–36)
MCHC RBC-ENTMCNC: 34.1 G/DL — SIGNIFICANT CHANGE UP (ref 32–36)
MCHC RBC-ENTMCNC: 34.2 % — SIGNIFICANT CHANGE UP (ref 32–36)
MCHC RBC-ENTMCNC: 34.3 % — SIGNIFICANT CHANGE UP (ref 32–36)
MCHC RBC-ENTMCNC: 34.3 PG — HIGH (ref 27–34)
MCHC RBC-ENTMCNC: 34.5 % — SIGNIFICANT CHANGE UP (ref 32–36)
MCHC RBC-ENTMCNC: 34.8 % — SIGNIFICANT CHANGE UP (ref 32–36)
MCHC RBC-ENTMCNC: 35 % — SIGNIFICANT CHANGE UP (ref 32–36)
MCHC RBC-ENTMCNC: 35.4 % — SIGNIFICANT CHANGE UP (ref 32–36)
MCV RBC AUTO: 100 FL — SIGNIFICANT CHANGE UP (ref 80–100)
MCV RBC AUTO: 101 FL — HIGH (ref 80–100)
MCV RBC AUTO: 86.9 FL — SIGNIFICANT CHANGE UP (ref 80–100)
MCV RBC AUTO: 87.8 FL — SIGNIFICANT CHANGE UP (ref 80–100)
MCV RBC AUTO: 88 FL — SIGNIFICANT CHANGE UP (ref 80–100)
MCV RBC AUTO: 88.2 FL — SIGNIFICANT CHANGE UP (ref 80–100)
MCV RBC AUTO: 88.5 FL — SIGNIFICANT CHANGE UP (ref 80–100)
MCV RBC AUTO: 88.6 FL — SIGNIFICANT CHANGE UP (ref 80–100)
MCV RBC AUTO: 88.7 FL — SIGNIFICANT CHANGE UP (ref 80–100)
MCV RBC AUTO: 88.8 FL — SIGNIFICANT CHANGE UP (ref 80–100)
MCV RBC AUTO: 88.8 FL — SIGNIFICANT CHANGE UP (ref 80–100)
MCV RBC AUTO: 89.2 FL — SIGNIFICANT CHANGE UP (ref 80–100)
MCV RBC AUTO: 89.2 FL — SIGNIFICANT CHANGE UP (ref 80–100)
MCV RBC AUTO: 89.7 FL — SIGNIFICANT CHANGE UP (ref 80–100)
MCV RBC AUTO: 89.9 FL
MCV RBC AUTO: 90.1 FL — SIGNIFICANT CHANGE UP (ref 80–100)
MCV RBC AUTO: 90.2 FL — SIGNIFICANT CHANGE UP (ref 80–100)
MCV RBC AUTO: 90.4 FL — SIGNIFICANT CHANGE UP (ref 80–100)
MCV RBC AUTO: 91.8 FL — SIGNIFICANT CHANGE UP (ref 80–100)
MCV RBC AUTO: 92.3 FL
MCV RBC AUTO: 92.8 FL
MCV RBC AUTO: 93.6 FL — SIGNIFICANT CHANGE UP (ref 80–100)
MCV RBC AUTO: 94.5 FL — SIGNIFICANT CHANGE UP (ref 80–100)
MCV RBC AUTO: 97.2 FL — SIGNIFICANT CHANGE UP (ref 80–100)
MCV RBC AUTO: 97.3 FL — SIGNIFICANT CHANGE UP (ref 80–100)
METAMYELOCYTES # FLD: 0 % — SIGNIFICANT CHANGE UP (ref 0–1)
MONOCYTES # BLD AUTO: 0.37 K/UL — SIGNIFICANT CHANGE UP (ref 0–0.9)
MONOCYTES # BLD AUTO: 0.5 K/UL — SIGNIFICANT CHANGE UP (ref 0–0.9)
MONOCYTES # BLD AUTO: 0.51 K/UL — SIGNIFICANT CHANGE UP (ref 0–0.9)
MONOCYTES # BLD AUTO: 0.53 K/UL
MONOCYTES # BLD AUTO: 0.6 K/UL
MONOCYTES # BLD AUTO: 0.6 K/UL — SIGNIFICANT CHANGE UP (ref 0–0.9)
MONOCYTES # BLD AUTO: 0.6 K/UL — SIGNIFICANT CHANGE UP (ref 0–0.9)
MONOCYTES # BLD AUTO: 0.78 K/UL — SIGNIFICANT CHANGE UP (ref 0–0.9)
MONOCYTES # BLD AUTO: 0.93 K/UL — HIGH (ref 0–0.9)
MONOCYTES # BLD AUTO: 1.09 K/UL
MONOCYTES # BLD AUTO: 1.52 K/UL — HIGH (ref 0–0.9)
MONOCYTES # BLD AUTO: 1.72 K/UL — HIGH (ref 0–0.9)
MONOCYTES NFR BLD AUTO: 10.2 % — SIGNIFICANT CHANGE UP (ref 2–14)
MONOCYTES NFR BLD AUTO: 11.1 %
MONOCYTES NFR BLD AUTO: 5.5 % — SIGNIFICANT CHANGE UP (ref 2–14)
MONOCYTES NFR BLD AUTO: 6.3 % — SIGNIFICANT CHANGE UP (ref 2–14)
MONOCYTES NFR BLD AUTO: 6.4 %
MONOCYTES NFR BLD AUTO: 8 % — SIGNIFICANT CHANGE UP (ref 2–14)
MONOCYTES NFR BLD AUTO: 8.2 % — SIGNIFICANT CHANGE UP (ref 2–14)
MONOCYTES NFR BLD AUTO: 8.5 % — SIGNIFICANT CHANGE UP (ref 2–14)
MONOCYTES NFR BLD AUTO: 8.6 %
MONOCYTES NFR BLD AUTO: 9.3 % — SIGNIFICANT CHANGE UP (ref 2–14)
MONOCYTES NFR BLD AUTO: 9.7 % — SIGNIFICANT CHANGE UP (ref 2–14)
MONOCYTES NFR BLD AUTO: 9.9 % — SIGNIFICANT CHANGE UP (ref 2–14)
MONOCYTES NFR BLD: 7 % — SIGNIFICANT CHANGE UP (ref 2–9)
MYELOCYTES NFR BLD: 0 % — SIGNIFICANT CHANGE UP (ref 0–0)
NEUTROPHIL AB SER-ACNC: 76.3 % — SIGNIFICANT CHANGE UP (ref 43–77)
NEUTROPHILS # BLD AUTO: 11.01 K/UL
NEUTROPHILS # BLD AUTO: 13 K/UL — HIGH (ref 1.8–7.4)
NEUTROPHILS # BLD AUTO: 14.36 K/UL — HIGH (ref 1.8–7.4)
NEUTROPHILS # BLD AUTO: 3.86 K/UL
NEUTROPHILS # BLD AUTO: 4.07 K/UL — SIGNIFICANT CHANGE UP (ref 1.8–7.4)
NEUTROPHILS # BLD AUTO: 4.69 K/UL — SIGNIFICANT CHANGE UP (ref 1.8–7.4)
NEUTROPHILS # BLD AUTO: 4.9 K/UL — SIGNIFICANT CHANGE UP (ref 1.8–7.4)
NEUTROPHILS # BLD AUTO: 5.06 K/UL — SIGNIFICANT CHANGE UP (ref 1.8–7.4)
NEUTROPHILS # BLD AUTO: 5.2 K/UL — SIGNIFICANT CHANGE UP (ref 1.8–7.4)
NEUTROPHILS # BLD AUTO: 6.38 K/UL
NEUTROPHILS # BLD AUTO: 7.36 K/UL — SIGNIFICANT CHANGE UP (ref 1.8–7.4)
NEUTROPHILS # BLD AUTO: 9.65 K/UL — HIGH (ref 1.8–7.4)
NEUTROPHILS NFR BLD AUTO: 61.8 % — SIGNIFICANT CHANGE UP (ref 43–77)
NEUTROPHILS NFR BLD AUTO: 64.7 % — SIGNIFICANT CHANGE UP (ref 43–77)
NEUTROPHILS NFR BLD AUTO: 71.2 %
NEUTROPHILS NFR BLD AUTO: 74.2 % — SIGNIFICANT CHANGE UP (ref 43–77)
NEUTROPHILS NFR BLD AUTO: 75.8 % — SIGNIFICANT CHANGE UP (ref 43–77)
NEUTROPHILS NFR BLD AUTO: 76 % — SIGNIFICANT CHANGE UP (ref 43–77)
NEUTROPHILS NFR BLD AUTO: 77.6 %
NEUTROPHILS NFR BLD AUTO: 80.1 % — HIGH (ref 43–77)
NEUTROPHILS NFR BLD AUTO: 83.4 % — HIGH (ref 43–77)
NEUTROPHILS NFR BLD AUTO: 84.2 % — HIGH (ref 43–77)
NEUTROPHILS NFR BLD AUTO: 85.3 % — HIGH (ref 43–77)
NEUTROPHILS NFR BLD AUTO: 86.5 %
NEUTS BAND # BLD: 5.3 % — SIGNIFICANT CHANGE UP (ref 0–6)
NITRITE UR-MCNC: NEGATIVE — SIGNIFICANT CHANGE UP
NRBC # FLD: 0 K/UL — SIGNIFICANT CHANGE UP (ref 0–0)
OSMOLALITY SERPL: 259 MOSMO/KG — LOW (ref 275–295)
OSMOLALITY UR: 275 MOSMO/KG — SIGNIFICANT CHANGE UP (ref 50–1200)
OSMOLALITY UR: 378 MOSMO/KG — SIGNIFICANT CHANGE UP (ref 50–1200)
OTHER - HEMATOLOGY %: 0 — SIGNIFICANT CHANGE UP
PCO2 BLDA: 35 MMHG — SIGNIFICANT CHANGE UP (ref 35–48)
PCO2 BLDV: 36 MMHG — LOW (ref 41–51)
PCO2 BLDV: 42 MMHG — SIGNIFICANT CHANGE UP (ref 41–51)
PH BLDA: 7.45 PH — SIGNIFICANT CHANGE UP (ref 7.35–7.45)
PH BLDV: 7.43 PH — SIGNIFICANT CHANGE UP (ref 7.32–7.43)
PH BLDV: 7.46 PH — HIGH (ref 7.32–7.43)
PH UR: 6 — SIGNIFICANT CHANGE UP (ref 5–8)
PHOSPHATE SERPL-MCNC: 1.7 MG/DL — LOW (ref 2.5–4.5)
PHOSPHATE SERPL-MCNC: 2 MG/DL — LOW (ref 2.5–4.5)
PHOSPHATE SERPL-MCNC: 2.3 MG/DL — LOW (ref 2.5–4.5)
PHOSPHATE SERPL-MCNC: 2.3 MG/DL — LOW (ref 2.5–4.5)
PHOSPHATE SERPL-MCNC: 2.6 MG/DL — SIGNIFICANT CHANGE UP (ref 2.5–4.5)
PHOSPHATE SERPL-MCNC: 2.6 MG/DL — SIGNIFICANT CHANGE UP (ref 2.5–4.5)
PHOSPHATE SERPL-MCNC: 2.7 MG/DL — SIGNIFICANT CHANGE UP (ref 2.5–4.5)
PHOSPHATE SERPL-MCNC: 2.7 MG/DL — SIGNIFICANT CHANGE UP (ref 2.5–4.5)
PHOSPHATE SERPL-MCNC: 2.8 MG/DL — SIGNIFICANT CHANGE UP (ref 2.5–4.5)
PHOSPHATE SERPL-MCNC: 2.8 MG/DL — SIGNIFICANT CHANGE UP (ref 2.5–4.5)
PHOSPHATE SERPL-MCNC: 2.9 MG/DL — SIGNIFICANT CHANGE UP (ref 2.5–4.5)
PHOSPHATE SERPL-MCNC: 2.9 MG/DL — SIGNIFICANT CHANGE UP (ref 2.5–4.5)
PHOSPHATE SERPL-MCNC: 3 MG/DL — SIGNIFICANT CHANGE UP (ref 2.5–4.5)
PHOSPHATE SERPL-MCNC: 3.1 MG/DL — SIGNIFICANT CHANGE UP (ref 2.5–4.5)
PHOSPHATE SERPL-MCNC: 3.3 MG/DL — SIGNIFICANT CHANGE UP (ref 2.5–4.5)
PHOSPHATE SERPL-MCNC: 3.4 MG/DL — SIGNIFICANT CHANGE UP (ref 2.5–4.5)
PHOSPHATE SERPL-MCNC: 3.6 MG/DL — SIGNIFICANT CHANGE UP (ref 2.5–4.5)
PHOSPHATE SERPL-MCNC: 3.9 MG/DL — SIGNIFICANT CHANGE UP (ref 2.5–4.5)
PLATELET # BLD AUTO: 277 K/UL — SIGNIFICANT CHANGE UP (ref 150–400)
PLATELET # BLD AUTO: 279 K/UL — SIGNIFICANT CHANGE UP (ref 150–400)
PLATELET # BLD AUTO: 339 K/UL — SIGNIFICANT CHANGE UP (ref 150–400)
PLATELET # BLD AUTO: 343 K/UL — SIGNIFICANT CHANGE UP (ref 150–400)
PLATELET # BLD AUTO: 347 K/UL — SIGNIFICANT CHANGE UP (ref 150–400)
PLATELET # BLD AUTO: 347 K/UL — SIGNIFICANT CHANGE UP (ref 150–400)
PLATELET # BLD AUTO: 355 K/UL — SIGNIFICANT CHANGE UP (ref 150–400)
PLATELET # BLD AUTO: 379 K/UL — SIGNIFICANT CHANGE UP (ref 150–400)
PLATELET # BLD AUTO: 392 K/UL — SIGNIFICANT CHANGE UP (ref 150–400)
PLATELET # BLD AUTO: 399 K/UL — SIGNIFICANT CHANGE UP (ref 150–400)
PLATELET # BLD AUTO: 421 K/UL — HIGH (ref 150–400)
PLATELET # BLD AUTO: 448 K/UL — HIGH (ref 150–400)
PLATELET # BLD AUTO: 449 K/UL — HIGH (ref 150–400)
PLATELET # BLD AUTO: 449 K/UL — HIGH (ref 150–400)
PLATELET # BLD AUTO: 455 K/UL — HIGH (ref 150–400)
PLATELET # BLD AUTO: 480 K/UL — HIGH (ref 150–400)
PLATELET # BLD AUTO: 488 K/UL
PLATELET # BLD AUTO: 490 K/UL — HIGH (ref 150–400)
PLATELET # BLD AUTO: 502 K/UL
PLATELET # BLD AUTO: 504 K/UL — HIGH (ref 150–400)
PLATELET # BLD AUTO: 506 K/UL — HIGH (ref 150–400)
PLATELET # BLD AUTO: 518 K/UL — HIGH (ref 150–400)
PLATELET # BLD AUTO: 520 K/UL — HIGH (ref 150–400)
PLATELET # BLD AUTO: 525 K/UL — HIGH (ref 150–400)
PLATELET # BLD AUTO: 717 K/UL
PLATELET COUNT - ESTIMATE: NORMAL — SIGNIFICANT CHANGE UP
PMV BLD: 8.3 FL — SIGNIFICANT CHANGE UP (ref 7–13)
PMV BLD: 8.5 FL — SIGNIFICANT CHANGE UP (ref 7–13)
PMV BLD: 8.6 FL — SIGNIFICANT CHANGE UP (ref 7–13)
PMV BLD: 8.6 FL — SIGNIFICANT CHANGE UP (ref 7–13)
PMV BLD: 8.7 FL — SIGNIFICANT CHANGE UP (ref 7–13)
PMV BLD: 8.8 FL — SIGNIFICANT CHANGE UP (ref 7–13)
PMV BLD: 8.9 FL — SIGNIFICANT CHANGE UP (ref 7–13)
PMV BLD: 9 FL — SIGNIFICANT CHANGE UP (ref 7–13)
PMV BLD: 9 FL — SIGNIFICANT CHANGE UP (ref 7–13)
PMV BLD: 9.1 FL — SIGNIFICANT CHANGE UP (ref 7–13)
PMV BLD: 9.2 FL — SIGNIFICANT CHANGE UP (ref 7–13)
PMV BLD: 9.2 FL — SIGNIFICANT CHANGE UP (ref 7–13)
PMV BLD: 9.3 FL — SIGNIFICANT CHANGE UP (ref 7–13)
PMV BLD: 9.8 FL — SIGNIFICANT CHANGE UP (ref 7–13)
PO2 BLDA: 351 MMHG — HIGH (ref 83–108)
PO2 BLDV: 25 MMHG — LOW (ref 35–40)
PO2 BLDV: 41 MMHG — HIGH (ref 35–40)
POIKILOCYTOSIS BLD QL AUTO: SLIGHT — SIGNIFICANT CHANGE UP
POTASSIUM BLDA-SCNC: 4.6 MMOL/L — HIGH (ref 3.4–4.5)
POTASSIUM BLDV-SCNC: 2.9 MMOL/L — CRITICAL LOW (ref 3.4–4.5)
POTASSIUM BLDV-SCNC: 3.9 MMOL/L — SIGNIFICANT CHANGE UP (ref 3.4–4.5)
POTASSIUM SERPL-MCNC: 2.9 MMOL/L — CRITICAL LOW (ref 3.5–5.3)
POTASSIUM SERPL-MCNC: 3.1 MMOL/L — LOW (ref 3.5–5.3)
POTASSIUM SERPL-MCNC: 3.3 MMOL/L — LOW (ref 3.5–5.3)
POTASSIUM SERPL-MCNC: 3.4 MMOL/L — LOW (ref 3.5–5.3)
POTASSIUM SERPL-MCNC: 3.5 MMOL/L — SIGNIFICANT CHANGE UP (ref 3.5–5.3)
POTASSIUM SERPL-MCNC: 3.5 MMOL/L — SIGNIFICANT CHANGE UP (ref 3.5–5.3)
POTASSIUM SERPL-MCNC: 3.6 MMOL/L — SIGNIFICANT CHANGE UP (ref 3.5–5.3)
POTASSIUM SERPL-MCNC: 3.6 MMOL/L — SIGNIFICANT CHANGE UP (ref 3.5–5.3)
POTASSIUM SERPL-MCNC: 3.7 MMOL/L — SIGNIFICANT CHANGE UP (ref 3.5–5.3)
POTASSIUM SERPL-MCNC: 3.8 MMOL/L — SIGNIFICANT CHANGE UP (ref 3.5–5.3)
POTASSIUM SERPL-MCNC: 3.9 MMOL/L — SIGNIFICANT CHANGE UP (ref 3.5–5.3)
POTASSIUM SERPL-MCNC: 3.9 MMOL/L — SIGNIFICANT CHANGE UP (ref 3.5–5.3)
POTASSIUM SERPL-MCNC: 4 MMOL/L — SIGNIFICANT CHANGE UP (ref 3.5–5.3)
POTASSIUM SERPL-MCNC: 4.1 MMOL/L — SIGNIFICANT CHANGE UP (ref 3.5–5.3)
POTASSIUM SERPL-MCNC: 4.2 MMOL/L — SIGNIFICANT CHANGE UP (ref 3.5–5.3)
POTASSIUM SERPL-MCNC: 4.3 MMOL/L — SIGNIFICANT CHANGE UP (ref 3.5–5.3)
POTASSIUM SERPL-MCNC: 4.4 MMOL/L — SIGNIFICANT CHANGE UP (ref 3.5–5.3)
POTASSIUM SERPL-MCNC: 4.4 MMOL/L — SIGNIFICANT CHANGE UP (ref 3.5–5.3)
POTASSIUM SERPL-MCNC: 4.9 MMOL/L — SIGNIFICANT CHANGE UP (ref 3.5–5.3)
POTASSIUM SERPL-MCNC: 5.1 MMOL/L — SIGNIFICANT CHANGE UP (ref 3.5–5.3)
POTASSIUM SERPL-SCNC: 2.9 MMOL/L — CRITICAL LOW (ref 3.5–5.3)
POTASSIUM SERPL-SCNC: 3.1 MMOL/L — LOW (ref 3.5–5.3)
POTASSIUM SERPL-SCNC: 3.3 MMOL/L — LOW (ref 3.5–5.3)
POTASSIUM SERPL-SCNC: 3.4 MMOL/L — LOW (ref 3.5–5.3)
POTASSIUM SERPL-SCNC: 3.5 MMOL/L — SIGNIFICANT CHANGE UP (ref 3.5–5.3)
POTASSIUM SERPL-SCNC: 3.5 MMOL/L — SIGNIFICANT CHANGE UP (ref 3.5–5.3)
POTASSIUM SERPL-SCNC: 3.6 MMOL/L — SIGNIFICANT CHANGE UP (ref 3.5–5.3)
POTASSIUM SERPL-SCNC: 3.6 MMOL/L — SIGNIFICANT CHANGE UP (ref 3.5–5.3)
POTASSIUM SERPL-SCNC: 3.7 MMOL/L — SIGNIFICANT CHANGE UP (ref 3.5–5.3)
POTASSIUM SERPL-SCNC: 3.8 MMOL/L — SIGNIFICANT CHANGE UP (ref 3.5–5.3)
POTASSIUM SERPL-SCNC: 3.9 MMOL/L — SIGNIFICANT CHANGE UP (ref 3.5–5.3)
POTASSIUM SERPL-SCNC: 3.9 MMOL/L — SIGNIFICANT CHANGE UP (ref 3.5–5.3)
POTASSIUM SERPL-SCNC: 4 MMOL/L — SIGNIFICANT CHANGE UP (ref 3.5–5.3)
POTASSIUM SERPL-SCNC: 4.1 MMOL/L — SIGNIFICANT CHANGE UP (ref 3.5–5.3)
POTASSIUM SERPL-SCNC: 4.2 MMOL/L — SIGNIFICANT CHANGE UP (ref 3.5–5.3)
POTASSIUM SERPL-SCNC: 4.3 MMOL/L
POTASSIUM SERPL-SCNC: 4.3 MMOL/L — SIGNIFICANT CHANGE UP (ref 3.5–5.3)
POTASSIUM SERPL-SCNC: 4.4 MMOL/L — SIGNIFICANT CHANGE UP (ref 3.5–5.3)
POTASSIUM SERPL-SCNC: 4.4 MMOL/L — SIGNIFICANT CHANGE UP (ref 3.5–5.3)
POTASSIUM SERPL-SCNC: 4.6 MMOL/L
POTASSIUM SERPL-SCNC: 4.9 MMOL/L — SIGNIFICANT CHANGE UP (ref 3.5–5.3)
POTASSIUM SERPL-SCNC: 5.1 MMOL/L — SIGNIFICANT CHANGE UP (ref 3.5–5.3)
POTASSIUM UR-SCNC: 13.5 MMOL/L — SIGNIFICANT CHANGE UP
POTASSIUM UR-SCNC: 14 MMOL/L — SIGNIFICANT CHANGE UP
PROCALCITONIN SERPL-MCNC: 0.47 NG/ML — HIGH (ref 0.02–0.1)
PROCALCITONIN SERPL-MCNC: 0.49 NG/ML — HIGH (ref 0.02–0.1)
PROMYELOCYTES # FLD: 0 % — SIGNIFICANT CHANGE UP (ref 0–0)
PROT SERPL-MCNC: 6.7 G/DL — SIGNIFICANT CHANGE UP (ref 6–8.3)
PROT SERPL-MCNC: 6.9 G/DL
PROT SERPL-MCNC: 6.9 G/DL — SIGNIFICANT CHANGE UP (ref 6–8.3)
PROT SERPL-MCNC: 6.9 G/DL — SIGNIFICANT CHANGE UP (ref 6–8.3)
PROT SERPL-MCNC: 7 G/DL
PROT SERPL-MCNC: 7.4 G/DL — SIGNIFICANT CHANGE UP (ref 6–8.3)
PROT SERPL-MCNC: 7.9 G/DL — SIGNIFICANT CHANGE UP (ref 6–8.3)
PROT SERPL-MCNC: 8.5 G/DL — HIGH (ref 6–8.3)
PROT UR-MCNC: 70 — SIGNIFICANT CHANGE UP
PROTHROM AB SERPL-ACNC: 12.7 SEC — SIGNIFICANT CHANGE UP (ref 9.8–13.1)
PROTHROM AB SERPL-ACNC: 13.1 SEC — SIGNIFICANT CHANGE UP (ref 9.8–13.1)
PROTHROM AB SERPL-ACNC: 13.6 SEC — HIGH (ref 9.8–13.1)
RBC # BLD: 3.25 M/UL — LOW (ref 4.2–5.8)
RBC # BLD: 3.3 M/UL — LOW (ref 4.2–5.8)
RBC # BLD: 3.44 M/UL — LOW (ref 4.2–5.8)
RBC # BLD: 3.45 M/UL — LOW (ref 4.2–5.8)
RBC # BLD: 3.49 M/UL — LOW (ref 4.2–5.8)
RBC # BLD: 3.66 M/UL — LOW (ref 4.2–5.8)
RBC # BLD: 3.68 M/UL — LOW (ref 4.2–5.8)
RBC # BLD: 3.72 M/UL — LOW (ref 4.2–5.8)
RBC # BLD: 3.74 M/UL — LOW (ref 4.2–5.8)
RBC # BLD: 3.76 M/UL
RBC # BLD: 3.77 M/UL
RBC # BLD: 3.77 M/UL
RBC # BLD: 3.78 M/UL — LOW (ref 4.2–5.8)
RBC # BLD: 3.78 M/UL — LOW (ref 4.2–5.8)
RBC # BLD: 3.8 M/UL — LOW (ref 4.2–5.8)
RBC # BLD: 3.87 M/UL — LOW (ref 4.2–5.8)
RBC # BLD: 3.88 M/UL — LOW (ref 4.2–5.8)
RBC # BLD: 3.92 M/UL — LOW (ref 4.2–5.8)
RBC # BLD: 3.94 M/UL — LOW (ref 4.2–5.8)
RBC # BLD: 3.95 M/UL — LOW (ref 4.2–5.8)
RBC # BLD: 4.02 M/UL — LOW (ref 4.2–5.8)
RBC # BLD: 4.03 M/UL — LOW (ref 4.2–5.8)
RBC # BLD: 4.04 M/UL — LOW (ref 4.2–5.8)
RBC # BLD: 4.14 M/UL — LOW (ref 4.2–5.8)
RBC # BLD: 4.26 M/UL — SIGNIFICANT CHANGE UP (ref 4.2–5.8)
RBC # FLD: 12.1 % — SIGNIFICANT CHANGE UP (ref 10.3–14.5)
RBC # FLD: 12.4 % — SIGNIFICANT CHANGE UP (ref 10.3–14.5)
RBC # FLD: 12.5 %
RBC # FLD: 12.5 % — SIGNIFICANT CHANGE UP (ref 10.3–14.5)
RBC # FLD: 12.7 % — SIGNIFICANT CHANGE UP (ref 10.3–14.5)
RBC # FLD: 12.8 % — SIGNIFICANT CHANGE UP (ref 10.3–14.5)
RBC # FLD: 12.8 % — SIGNIFICANT CHANGE UP (ref 10.3–14.5)
RBC # FLD: 12.9 % — SIGNIFICANT CHANGE UP (ref 10.3–14.5)
RBC # FLD: 13.1 %
RBC # FLD: 13.3 % — SIGNIFICANT CHANGE UP (ref 10.3–14.5)
RBC # FLD: 13.4 % — SIGNIFICANT CHANGE UP (ref 10.3–14.5)
RBC # FLD: 13.5 % — SIGNIFICANT CHANGE UP (ref 10.3–14.5)
RBC # FLD: 13.6 % — SIGNIFICANT CHANGE UP (ref 10.3–14.5)
RBC # FLD: 13.6 % — SIGNIFICANT CHANGE UP (ref 10.3–14.5)
RBC # FLD: 14.6 %
RBC CASTS # UR COMP ASSIST: SIGNIFICANT CHANGE UP (ref 0–?)
RH IG SCN BLD-IMP: POSITIVE — SIGNIFICANT CHANGE UP
SAO2 % BLDA: 99.8 % — HIGH (ref 95–99)
SAO2 % BLDV: 40.5 % — LOW (ref 60–85)
SAO2 % BLDV: 74.1 % — SIGNIFICANT CHANGE UP (ref 60–85)
SARS-COV-2 RNA SPEC QL NAA+PROBE: DETECTED
SARS-COV-2 RNA SPEC QL NAA+PROBE: DETECTED
SCHISTOCYTES BLD QL AUTO: SLIGHT — SIGNIFICANT CHANGE UP
SODIUM BLDA-SCNC: 127 MMOL/L — LOW (ref 136–146)
SODIUM SERPL-SCNC: 115 MMOL/L — CRITICAL LOW (ref 135–145)
SODIUM SERPL-SCNC: 117 MMOL/L — CRITICAL LOW (ref 135–145)
SODIUM SERPL-SCNC: 118 MMOL/L — CRITICAL LOW (ref 135–145)
SODIUM SERPL-SCNC: 121 MMOL/L — LOW (ref 135–145)
SODIUM SERPL-SCNC: 122 MMOL/L
SODIUM SERPL-SCNC: 123 MMOL/L — LOW (ref 135–145)
SODIUM SERPL-SCNC: 124 MMOL/L — LOW (ref 135–145)
SODIUM SERPL-SCNC: 124 MMOL/L — LOW (ref 135–145)
SODIUM SERPL-SCNC: 125 MMOL/L — LOW (ref 135–145)
SODIUM SERPL-SCNC: 126 MMOL/L — LOW (ref 135–145)
SODIUM SERPL-SCNC: 127 MMOL/L — LOW (ref 135–145)
SODIUM SERPL-SCNC: 129 MMOL/L — LOW (ref 135–145)
SODIUM SERPL-SCNC: 130 MMOL/L — LOW (ref 135–145)
SODIUM SERPL-SCNC: 132 MMOL/L — LOW (ref 135–145)
SODIUM SERPL-SCNC: 132 MMOL/L — LOW (ref 135–145)
SODIUM SERPL-SCNC: 133 MMOL/L — LOW (ref 135–145)
SODIUM SERPL-SCNC: 135 MMOL/L — SIGNIFICANT CHANGE UP (ref 135–145)
SODIUM SERPL-SCNC: 136 MMOL/L — SIGNIFICANT CHANGE UP (ref 135–145)
SODIUM SERPL-SCNC: 137 MMOL/L
SODIUM SERPL-SCNC: 141 MMOL/L — SIGNIFICANT CHANGE UP (ref 135–145)
SODIUM SERPL-SCNC: 142 MMOL/L — SIGNIFICANT CHANGE UP (ref 135–145)
SODIUM SERPL-SCNC: 143 MMOL/L — SIGNIFICANT CHANGE UP (ref 135–145)
SODIUM UR-SCNC: 110 MMOL/L — SIGNIFICANT CHANGE UP
SODIUM UR-SCNC: 49 MMOL/L — SIGNIFICANT CHANGE UP
SODIUM UR-SCNC: 98 MMOL/L — SIGNIFICANT CHANGE UP
SP GR SPEC: 1.03 — SIGNIFICANT CHANGE UP (ref 1–1.04)
SPECIMEN SOURCE: SIGNIFICANT CHANGE UP
SPECIMEN SOURCE: SIGNIFICANT CHANGE UP
SQUAMOUS # UR AUTO: SIGNIFICANT CHANGE UP
TRIGL SERPL-MCNC: 202 MG/DL — HIGH (ref 10–149)
TSH SERPL-ACNC: 3.42 UIU/ML
TSH SERPL-MCNC: 1.21 UIU/ML — SIGNIFICANT CHANGE UP (ref 0.27–4.2)
TSH SERPL-MCNC: 1.31 UIU/ML — SIGNIFICANT CHANGE UP (ref 0.27–4.2)
URATE CRY FLD QL MICRO: SIGNIFICANT CHANGE UP
URATE SERPL-MCNC: 5.1 MG/DL — SIGNIFICANT CHANGE UP (ref 3.4–8.8)
UROBILINOGEN FLD QL: NORMAL — SIGNIFICANT CHANGE UP
VARIANT LYMPHS # BLD: 1.8 % — SIGNIFICANT CHANGE UP
WBC # BLD: 10.74 K/UL — HIGH (ref 3.8–10.5)
WBC # BLD: 11.54 K/UL — HIGH (ref 3.8–10.5)
WBC # BLD: 11.57 K/UL — HIGH (ref 3.8–10.5)
WBC # BLD: 15.43 K/UL — HIGH (ref 3.8–10.5)
WBC # BLD: 16.84 K/UL — HIGH (ref 3.8–10.5)
WBC # BLD: 5.48 K/UL — SIGNIFICANT CHANGE UP (ref 3.8–10.5)
WBC # BLD: 6.17 K/UL — SIGNIFICANT CHANGE UP (ref 3.8–10.5)
WBC # BLD: 6.57 K/UL — SIGNIFICANT CHANGE UP (ref 3.8–10.5)
WBC # BLD: 6.68 K/UL — SIGNIFICANT CHANGE UP (ref 3.8–10.5)
WBC # BLD: 6.87 K/UL — SIGNIFICANT CHANGE UP (ref 3.8–10.5)
WBC # BLD: 7.64 K/UL — SIGNIFICANT CHANGE UP (ref 3.8–10.5)
WBC # BLD: 7.89 K/UL — SIGNIFICANT CHANGE UP (ref 3.8–10.5)
WBC # BLD: 7.9 K/UL — SIGNIFICANT CHANGE UP (ref 3.8–10.5)
WBC # BLD: 8 K/UL — SIGNIFICANT CHANGE UP (ref 3.8–10.5)
WBC # BLD: 8 K/UL — SIGNIFICANT CHANGE UP (ref 3.8–10.5)
WBC # BLD: 8.02 K/UL — SIGNIFICANT CHANGE UP (ref 3.8–10.5)
WBC # BLD: 8.03 K/UL — SIGNIFICANT CHANGE UP (ref 3.8–10.5)
WBC # BLD: 8.1 K/UL — SIGNIFICANT CHANGE UP (ref 3.8–10.5)
WBC # BLD: 8.33 K/UL — SIGNIFICANT CHANGE UP (ref 3.8–10.5)
WBC # BLD: 8.53 K/UL — SIGNIFICANT CHANGE UP (ref 3.8–10.5)
WBC # BLD: 8.56 K/UL — SIGNIFICANT CHANGE UP (ref 3.8–10.5)
WBC # BLD: 9.19 K/UL — SIGNIFICANT CHANGE UP (ref 3.8–10.5)
WBC # FLD AUTO: 10.74 K/UL — HIGH (ref 3.8–10.5)
WBC # FLD AUTO: 11.54 K/UL — HIGH (ref 3.8–10.5)
WBC # FLD AUTO: 11.57 K/UL — HIGH (ref 3.8–10.5)
WBC # FLD AUTO: 12.72 K/UL
WBC # FLD AUTO: 15.43 K/UL — HIGH (ref 3.8–10.5)
WBC # FLD AUTO: 16.84 K/UL — HIGH (ref 3.8–10.5)
WBC # FLD AUTO: 5.42 K/UL
WBC # FLD AUTO: 5.48 K/UL — SIGNIFICANT CHANGE UP (ref 3.8–10.5)
WBC # FLD AUTO: 6.17 K/UL — SIGNIFICANT CHANGE UP (ref 3.8–10.5)
WBC # FLD AUTO: 6.57 K/UL — SIGNIFICANT CHANGE UP (ref 3.8–10.5)
WBC # FLD AUTO: 6.68 K/UL — SIGNIFICANT CHANGE UP (ref 3.8–10.5)
WBC # FLD AUTO: 6.87 K/UL — SIGNIFICANT CHANGE UP (ref 3.8–10.5)
WBC # FLD AUTO: 7.64 K/UL — SIGNIFICANT CHANGE UP (ref 3.8–10.5)
WBC # FLD AUTO: 7.89 K/UL — SIGNIFICANT CHANGE UP (ref 3.8–10.5)
WBC # FLD AUTO: 7.9 K/UL — SIGNIFICANT CHANGE UP (ref 3.8–10.5)
WBC # FLD AUTO: 8 K/UL — SIGNIFICANT CHANGE UP (ref 3.8–10.5)
WBC # FLD AUTO: 8 K/UL — SIGNIFICANT CHANGE UP (ref 3.8–10.5)
WBC # FLD AUTO: 8.02 K/UL — SIGNIFICANT CHANGE UP (ref 3.8–10.5)
WBC # FLD AUTO: 8.03 K/UL — SIGNIFICANT CHANGE UP (ref 3.8–10.5)
WBC # FLD AUTO: 8.1 K/UL — SIGNIFICANT CHANGE UP (ref 3.8–10.5)
WBC # FLD AUTO: 8.23 K/UL
WBC # FLD AUTO: 8.33 K/UL — SIGNIFICANT CHANGE UP (ref 3.8–10.5)
WBC # FLD AUTO: 8.53 K/UL — SIGNIFICANT CHANGE UP (ref 3.8–10.5)
WBC # FLD AUTO: 8.56 K/UL — SIGNIFICANT CHANGE UP (ref 3.8–10.5)
WBC # FLD AUTO: 9.19 K/UL — SIGNIFICANT CHANGE UP (ref 3.8–10.5)
WBC UR QL: SIGNIFICANT CHANGE UP (ref 0–?)

## 2020-01-01 PROCEDURE — 99232 SBSQ HOSP IP/OBS MODERATE 35: CPT

## 2020-01-01 PROCEDURE — 99498 ADVNCD CARE PLAN ADDL 30 MIN: CPT | Mod: 25,95

## 2020-01-01 PROCEDURE — 44005 FREEING OF BOWEL ADHESION: CPT

## 2020-01-01 PROCEDURE — 99205 OFFICE O/P NEW HI 60 MIN: CPT | Mod: 25,95

## 2020-01-01 PROCEDURE — 99024 POSTOP FOLLOW-UP VISIT: CPT

## 2020-01-01 PROCEDURE — 99233 SBSQ HOSP IP/OBS HIGH 50: CPT

## 2020-01-01 PROCEDURE — 99443: CPT

## 2020-01-01 PROCEDURE — 71045 X-RAY EXAM CHEST 1 VIEW: CPT | Mod: 26

## 2020-01-01 PROCEDURE — 99214 OFFICE O/P EST MOD 30 MIN: CPT

## 2020-01-01 PROCEDURE — 99497 ADVNCD CARE PLAN 30 MIN: CPT

## 2020-01-01 PROCEDURE — 99497 ADVNCD CARE PLAN 30 MIN: CPT | Mod: 25

## 2020-01-01 PROCEDURE — 52005 CYSTO W/URTRL CATHJ: CPT

## 2020-01-01 PROCEDURE — 99497 ADVNCD CARE PLAN 30 MIN: CPT | Mod: 95

## 2020-01-01 PROCEDURE — 99222 1ST HOSP IP/OBS MODERATE 55: CPT | Mod: GC

## 2020-01-01 PROCEDURE — 74177 CT ABD & PELVIS W/CONTRAST: CPT | Mod: 26

## 2020-01-01 PROCEDURE — 99350 HOME/RES VST EST HIGH MDM 60: CPT | Mod: 95

## 2020-01-01 PROCEDURE — 99233 SBSQ HOSP IP/OBS HIGH 50: CPT | Mod: GC

## 2020-01-01 PROCEDURE — 71275 CT ANGIOGRAPHY CHEST: CPT | Mod: 26

## 2020-01-01 PROCEDURE — 99213 OFFICE O/P EST LOW 20 MIN: CPT

## 2020-01-01 PROCEDURE — 99214 OFFICE O/P EST MOD 30 MIN: CPT | Mod: 95

## 2020-01-01 PROCEDURE — 99223 1ST HOSP IP/OBS HIGH 75: CPT | Mod: GC

## 2020-01-01 PROCEDURE — 93010 ELECTROCARDIOGRAM REPORT: CPT

## 2020-01-01 RX ORDER — METFORMIN HYDROCHLORIDE 500 MG/1
500 TABLET, COATED ORAL
Qty: 90 | Refills: 0 | Status: ACTIVE | COMMUNITY
Start: 2018-12-13

## 2020-01-01 RX ORDER — LISINOPRIL 10 MG/1
10 TABLET ORAL
Qty: 90 | Refills: 0 | Status: ACTIVE | COMMUNITY
Start: 2019-01-12

## 2020-01-01 RX ORDER — MAGNESIUM SULFATE 500 MG/ML
2 VIAL (ML) INJECTION ONCE
Refills: 0 | Status: COMPLETED | OUTPATIENT
Start: 2020-01-01 | End: 2020-01-01

## 2020-01-01 RX ORDER — SODIUM CHLORIDE 9 MG/ML
1000 INJECTION, SOLUTION INTRAVENOUS
Refills: 0 | Status: DISCONTINUED | OUTPATIENT
Start: 2020-01-01 | End: 2020-01-01

## 2020-01-01 RX ORDER — SODIUM CHLORIDE 9 MG/ML
1000 INJECTION INTRAMUSCULAR; INTRAVENOUS; SUBCUTANEOUS
Refills: 0 | Status: DISCONTINUED | OUTPATIENT
Start: 2020-01-01 | End: 2020-01-01

## 2020-01-01 RX ORDER — SODIUM,POTASSIUM PHOSPHATES 278-250MG
1 POWDER IN PACKET (EA) ORAL THREE TIMES A DAY
Refills: 0 | Status: COMPLETED | OUTPATIENT
Start: 2020-01-01 | End: 2020-01-01

## 2020-01-01 RX ORDER — PIPERACILLIN AND TAZOBACTAM 4; .5 G/20ML; G/20ML
3.38 INJECTION, POWDER, LYOPHILIZED, FOR SOLUTION INTRAVENOUS ONCE
Refills: 0 | Status: COMPLETED | OUTPATIENT
Start: 2020-01-01 | End: 2020-01-01

## 2020-01-01 RX ORDER — CEFUROXIME AXETIL 250 MG
500 TABLET ORAL EVERY 12 HOURS
Refills: 0 | Status: DISCONTINUED | OUTPATIENT
Start: 2020-01-01 | End: 2020-01-01

## 2020-01-01 RX ORDER — ENOXAPARIN SODIUM 100 MG/ML
30 INJECTION SUBCUTANEOUS DAILY
Refills: 0 | Status: DISCONTINUED | OUTPATIENT
Start: 2020-01-01 | End: 2020-01-01

## 2020-01-01 RX ORDER — HYDRALAZINE HCL 50 MG
10 TABLET ORAL ONCE
Refills: 0 | Status: COMPLETED | OUTPATIENT
Start: 2020-01-01 | End: 2020-01-01

## 2020-01-01 RX ORDER — SODIUM CHLORIDE 9 MG/ML
2 INJECTION INTRAMUSCULAR; INTRAVENOUS; SUBCUTANEOUS
Qty: 84 | Refills: 0
Start: 2020-01-01 | End: 2020-01-01

## 2020-01-01 RX ORDER — SODIUM CHLORIDE 9 MG/ML
1 INJECTION INTRAMUSCULAR; INTRAVENOUS; SUBCUTANEOUS ONCE
Refills: 0 | Status: COMPLETED | OUTPATIENT
Start: 2020-01-01 | End: 2020-01-01

## 2020-01-01 RX ORDER — SODIUM,POTASSIUM PHOSPHATES 278-250MG
1 POWDER IN PACKET (EA) ORAL
Refills: 0 | Status: DISCONTINUED | OUTPATIENT
Start: 2020-01-01 | End: 2020-01-01

## 2020-01-01 RX ORDER — POTASSIUM CHLORIDE 20 MEQ
40 PACKET (EA) ORAL EVERY 4 HOURS
Refills: 0 | Status: COMPLETED | OUTPATIENT
Start: 2020-01-01 | End: 2020-01-01

## 2020-01-01 RX ORDER — ACETAMINOPHEN 500 MG
2 TABLET ORAL
Qty: 0 | Refills: 0 | DISCHARGE
Start: 2020-01-01

## 2020-01-01 RX ORDER — LISINOPRIL 2.5 MG/1
1 TABLET ORAL
Qty: 0 | Refills: 0 | DISCHARGE
Start: 2020-01-01

## 2020-01-01 RX ORDER — OXYCODONE HYDROCHLORIDE 5 MG/1
5 TABLET ORAL
Refills: 0 | Status: DISCONTINUED | OUTPATIENT
Start: 2020-01-01 | End: 2020-01-01

## 2020-01-01 RX ORDER — ONDANSETRON 8 MG/1
4 TABLET, FILM COATED ORAL EVERY 6 HOURS
Refills: 0 | Status: DISCONTINUED | OUTPATIENT
Start: 2020-01-01 | End: 2020-01-01

## 2020-01-01 RX ORDER — POTASSIUM CHLORIDE 20 MEQ
20 PACKET (EA) ORAL ONCE
Refills: 0 | Status: COMPLETED | OUTPATIENT
Start: 2020-01-01 | End: 2020-01-01

## 2020-01-01 RX ORDER — SODIUM CHLORIDE 9 MG/ML
500 INJECTION, SOLUTION INTRAVENOUS ONCE
Refills: 0 | Status: COMPLETED | OUTPATIENT
Start: 2020-01-01 | End: 2020-01-01

## 2020-01-01 RX ORDER — ACETAMINOPHEN 500 MG
650 TABLET ORAL EVERY 6 HOURS
Refills: 0 | Status: DISCONTINUED | OUTPATIENT
Start: 2020-01-01 | End: 2020-01-01

## 2020-01-01 RX ORDER — METRONIDAZOLE 500 MG
1 TABLET ORAL
Qty: 28 | Refills: 0
Start: 2020-01-01 | End: 2020-01-01

## 2020-01-01 RX ORDER — INSULIN LISPRO 100/ML
VIAL (ML) SUBCUTANEOUS AT BEDTIME
Refills: 0 | Status: DISCONTINUED | OUTPATIENT
Start: 2020-01-01 | End: 2020-01-01

## 2020-01-01 RX ORDER — GUAIFENESIN/DEXTROMETHORPHAN 600MG-30MG
10 TABLET, EXTENDED RELEASE 12 HR ORAL EVERY 6 HOURS
Refills: 0 | Status: DISCONTINUED | OUTPATIENT
Start: 2020-01-01 | End: 2020-01-01

## 2020-01-01 RX ORDER — MAGNESIUM OXIDE 400 MG
400 (241.3 MG) TABLET ORAL
Qty: 60 | Refills: 3 | Status: DISCONTINUED | COMMUNITY
Start: 2019-01-01 | End: 2020-01-01

## 2020-01-01 RX ORDER — HYDROXYCHLOROQUINE SULFATE 200 MG
800 TABLET ORAL EVERY 24 HOURS
Refills: 0 | Status: DISCONTINUED | OUTPATIENT
Start: 2020-01-01 | End: 2020-01-01

## 2020-01-01 RX ORDER — SODIUM CHLORIDE 9 MG/ML
2 INJECTION INTRAMUSCULAR; INTRAVENOUS; SUBCUTANEOUS THREE TIMES A DAY
Refills: 0 | Status: DISCONTINUED | OUTPATIENT
Start: 2020-01-01 | End: 2020-01-01

## 2020-01-01 RX ORDER — NEOMYCIN SULFATE, POLYMYXIN B SULFATE, HYDROCORTISONE 3.5; 10000; 1 MG/ML; [USP'U]/ML; MG/ML
1 SOLUTION/ DROPS AURICULAR (OTIC)
Qty: 10 | Refills: 0 | Status: DISCONTINUED | COMMUNITY
Start: 2019-01-01

## 2020-01-01 RX ORDER — NORMAL SALT TABLETS 1 G/G
1 TABLET ORAL
Qty: 30 | Refills: 2 | Status: ACTIVE | COMMUNITY
Start: 2020-01-01

## 2020-01-01 RX ORDER — LISINOPRIL 2.5 MG/1
10 TABLET ORAL DAILY
Refills: 0 | Status: DISCONTINUED | OUTPATIENT
Start: 2020-01-01 | End: 2020-01-01

## 2020-01-01 RX ORDER — INSULIN LISPRO 100/ML
VIAL (ML) SUBCUTANEOUS
Refills: 0 | Status: DISCONTINUED | OUTPATIENT
Start: 2020-01-01 | End: 2020-01-01

## 2020-01-01 RX ORDER — DEXTROSE 50 % IN WATER 50 %
15 SYRINGE (ML) INTRAVENOUS ONCE
Refills: 0 | Status: DISCONTINUED | OUTPATIENT
Start: 2020-01-01 | End: 2020-01-01

## 2020-01-01 RX ORDER — HYDROMORPHONE HYDROCHLORIDE 2 MG/ML
0.5 INJECTION INTRAMUSCULAR; INTRAVENOUS; SUBCUTANEOUS
Refills: 0 | Status: DISCONTINUED | OUTPATIENT
Start: 2020-01-01 | End: 2020-01-01

## 2020-01-01 RX ORDER — MAGNESIUM OXIDE 400 MG ORAL TABLET 241.3 MG
1 TABLET ORAL
Qty: 0 | Refills: 0 | DISCHARGE

## 2020-01-01 RX ORDER — DEXTROSE 50 % IN WATER 50 %
25 SYRINGE (ML) INTRAVENOUS ONCE
Refills: 0 | Status: DISCONTINUED | OUTPATIENT
Start: 2020-01-01 | End: 2020-01-01

## 2020-01-01 RX ORDER — HEPARIN SODIUM,PORCINE 300/3 ML
100 SYRINGE (ML) INTRAVENOUS
Qty: 5 | Refills: 2 | Status: ACTIVE | COMMUNITY
Start: 2020-01-01 | End: 1900-01-01

## 2020-01-01 RX ORDER — SODIUM CHLORIDE 9 MG/ML
2 INJECTION INTRAMUSCULAR; INTRAVENOUS; SUBCUTANEOUS
Refills: 0 | Status: DISCONTINUED | OUTPATIENT
Start: 2020-01-01 | End: 2020-01-01

## 2020-01-01 RX ORDER — HYDRALAZINE HCL 50 MG
5 TABLET ORAL ONCE
Refills: 0 | Status: COMPLETED | OUTPATIENT
Start: 2020-01-01 | End: 2020-01-01

## 2020-01-01 RX ORDER — ASPIRIN ENTERIC COATED TABLETS 81 MG 81 MG/1
81 TABLET, DELAYED RELEASE ORAL
Qty: 8 | Refills: 0 | Status: ACTIVE | COMMUNITY
Start: 2018-06-26

## 2020-01-01 RX ORDER — AMOXICILLIN 500 MG/1
500 TABLET, FILM COATED ORAL
Qty: 28 | Refills: 0 | Status: DISCONTINUED | COMMUNITY
Start: 2019-01-01

## 2020-01-01 RX ORDER — DEXTROSE 50 % IN WATER 50 %
12.5 SYRINGE (ML) INTRAVENOUS ONCE
Refills: 0 | Status: DISCONTINUED | OUTPATIENT
Start: 2020-01-01 | End: 2020-01-01

## 2020-01-01 RX ORDER — ASPIRIN/CALCIUM CARB/MAGNESIUM 324 MG
81 TABLET ORAL DAILY
Refills: 0 | Status: DISCONTINUED | OUTPATIENT
Start: 2020-01-01 | End: 2020-01-01

## 2020-01-01 RX ORDER — HYDROXYCHLOROQUINE SULFATE 200 MG
400 TABLET ORAL EVERY 24 HOURS
Refills: 0 | Status: COMPLETED | OUTPATIENT
Start: 2020-01-01 | End: 2020-01-01

## 2020-01-01 RX ORDER — ENOXAPARIN SODIUM 100 MG/ML
40 INJECTION SUBCUTANEOUS DAILY
Refills: 0 | Status: DISCONTINUED | OUTPATIENT
Start: 2020-01-01 | End: 2020-01-01

## 2020-01-01 RX ORDER — SODIUM CHLORIDE 9 MG/ML
1000 INJECTION INTRAMUSCULAR; INTRAVENOUS; SUBCUTANEOUS ONCE
Refills: 0 | Status: COMPLETED | OUTPATIENT
Start: 2020-01-01 | End: 2020-01-01

## 2020-01-01 RX ORDER — GLUCAGON INJECTION, SOLUTION 0.5 MG/.1ML
1 INJECTION, SOLUTION SUBCUTANEOUS ONCE
Refills: 0 | Status: DISCONTINUED | OUTPATIENT
Start: 2020-01-01 | End: 2020-01-01

## 2020-01-01 RX ORDER — PIPERACILLIN AND TAZOBACTAM 4; .5 G/20ML; G/20ML
3.38 INJECTION, POWDER, LYOPHILIZED, FOR SOLUTION INTRAVENOUS EVERY 8 HOURS
Refills: 0 | Status: DISCONTINUED | OUTPATIENT
Start: 2020-01-01 | End: 2020-01-01

## 2020-01-01 RX ORDER — NALOXONE HYDROCHLORIDE 4 MG/.1ML
0.1 SPRAY NASAL
Refills: 0 | Status: DISCONTINUED | OUTPATIENT
Start: 2020-01-01 | End: 2020-01-01

## 2020-01-01 RX ORDER — OXYCODONE HYDROCHLORIDE 5 MG/1
10 TABLET ORAL
Refills: 0 | Status: DISCONTINUED | OUTPATIENT
Start: 2020-01-01 | End: 2020-01-01

## 2020-01-01 RX ORDER — CEFUROXIME AXETIL 250 MG
1 TABLET ORAL
Qty: 28 | Refills: 0
Start: 2020-01-01 | End: 2020-01-01

## 2020-01-01 RX ORDER — HEPARIN SODIUM 5000 [USP'U]/ML
5000 INJECTION INTRAVENOUS; SUBCUTANEOUS EVERY 8 HOURS
Refills: 0 | Status: DISCONTINUED | OUTPATIENT
Start: 2020-01-01 | End: 2020-01-01

## 2020-01-01 RX ORDER — BLOOD-GLUCOSE METER
KIT MISCELLANEOUS
Qty: 1 | Refills: 0 | Status: ACTIVE | COMMUNITY
Start: 2020-01-01 | End: 1900-01-01

## 2020-01-01 RX ORDER — FENTANYL CITRATE 50 UG/ML
25 INJECTION INTRAVENOUS
Refills: 0 | Status: DISCONTINUED | OUTPATIENT
Start: 2020-01-01 | End: 2020-01-01

## 2020-01-01 RX ORDER — POTASSIUM CHLORIDE 20 MEQ
40 PACKET (EA) ORAL ONCE
Refills: 0 | Status: COMPLETED | OUTPATIENT
Start: 2020-01-01 | End: 2020-01-01

## 2020-01-01 RX ORDER — METRONIDAZOLE 500 MG/1
500 TABLET ORAL
Qty: 20 | Refills: 0 | Status: COMPLETED | COMMUNITY
Start: 2020-01-01 | End: 2020-01-01

## 2020-01-01 RX ORDER — MAGNESIUM OXIDE 241.3 MG/1000MG
400 TABLET ORAL
Qty: 60 | Refills: 0 | Status: COMPLETED | COMMUNITY
Start: 2019-01-01 | End: 2020-01-01

## 2020-01-01 RX ORDER — HYDROXYCHLOROQUINE SULFATE 200 MG
TABLET ORAL
Refills: 0 | Status: DISCONTINUED | OUTPATIENT
Start: 2020-01-01 | End: 2020-01-01

## 2020-01-01 RX ORDER — NEOMYCIN SULFATE 500 MG/1
500 TABLET ORAL
Qty: 6 | Refills: 0 | Status: DISCONTINUED | COMMUNITY
Start: 2020-01-01 | End: 2020-01-01

## 2020-01-01 RX ORDER — METRONIDAZOLE 500 MG
500 TABLET ORAL EVERY 12 HOURS
Refills: 0 | Status: DISCONTINUED | OUTPATIENT
Start: 2020-01-01 | End: 2020-01-01

## 2020-01-01 RX ORDER — OXYCODONE HYDROCHLORIDE 5 MG/1
5 TABLET ORAL EVERY 8 HOURS
Refills: 0 | Status: DISCONTINUED | OUTPATIENT
Start: 2020-01-01 | End: 2020-01-01

## 2020-01-01 RX ORDER — ALBUTEROL 90 UG/1
2 AEROSOL, METERED ORAL EVERY 6 HOURS
Refills: 0 | Status: DISCONTINUED | OUTPATIENT
Start: 2020-01-01 | End: 2020-01-01

## 2020-01-01 RX ORDER — PROCHLORPERAZINE MALEATE 10 MG/1
10 TABLET ORAL EVERY 6 HOURS
Qty: 20 | Refills: 6 | Status: ACTIVE | COMMUNITY
Start: 2019-01-01

## 2020-01-01 RX ORDER — CEFUROXIME AXETIL 500 MG/1
500 TABLET ORAL
Qty: 20 | Refills: 0 | Status: COMPLETED | COMMUNITY
Start: 2020-01-01 | End: 2020-01-01

## 2020-01-01 RX ORDER — HYDROCORTISONE 10 MG/G
1 CREAM TOPICAL
Qty: 1 | Refills: 1 | Status: COMPLETED | COMMUNITY
Start: 2019-01-01 | End: 2020-01-01

## 2020-01-01 RX ORDER — LABETALOL HCL 100 MG
10 TABLET ORAL ONCE
Refills: 0 | Status: COMPLETED | OUTPATIENT
Start: 2020-01-01 | End: 2020-01-01

## 2020-01-01 RX ORDER — SODIUM CHLORIDE 9 MG/ML
500 INJECTION INTRAMUSCULAR; INTRAVENOUS; SUBCUTANEOUS ONCE
Refills: 0 | Status: COMPLETED | OUTPATIENT
Start: 2020-01-01 | End: 2020-01-01

## 2020-01-01 RX ORDER — OFLOXACIN OTIC 3 MG/ML
0.3 SOLUTION AURICULAR (OTIC)
Qty: 5 | Refills: 0 | Status: DISCONTINUED | COMMUNITY
Start: 2019-01-01

## 2020-01-01 RX ORDER — ATORVASTATIN CALCIUM 40 MG/1
40 TABLET, FILM COATED ORAL
Qty: 90 | Refills: 0 | Status: COMPLETED | COMMUNITY
Start: 2019-01-14 | End: 2020-01-01

## 2020-01-01 RX ORDER — ONDANSETRON 8 MG/1
4 TABLET, FILM COATED ORAL ONCE
Refills: 0 | Status: DISCONTINUED | OUTPATIENT
Start: 2020-01-01 | End: 2020-01-01

## 2020-01-01 RX ORDER — METFORMIN HYDROCHLORIDE 850 MG/1
1 TABLET ORAL
Qty: 0 | Refills: 0 | DISCHARGE

## 2020-01-01 RX ORDER — CHLORHEXIDINE GLUCONATE, 0.12% ORAL RINSE 1.2 MG/ML
0.12 SOLUTION DENTAL
Qty: 473 | Refills: 0 | Status: DISCONTINUED | COMMUNITY
Start: 2019-01-01

## 2020-01-01 RX ORDER — HYDROMORPHONE HYDROCHLORIDE 2 MG/ML
30 INJECTION INTRAMUSCULAR; INTRAVENOUS; SUBCUTANEOUS
Refills: 0 | Status: DISCONTINUED | OUTPATIENT
Start: 2020-01-01 | End: 2020-01-01

## 2020-01-01 RX ORDER — SODIUM CHLORIDE 9 MG/ML
500 INJECTION INTRAMUSCULAR; INTRAVENOUS; SUBCUTANEOUS ONCE
Refills: 0 | Status: DISCONTINUED | OUTPATIENT
Start: 2020-01-01 | End: 2020-01-01

## 2020-01-01 RX ORDER — SODIUM CHLORIDE 9 MG/ML
500 INJECTION, SOLUTION INTRAVENOUS
Refills: 0 | Status: DISCONTINUED | OUTPATIENT
Start: 2020-01-01 | End: 2020-01-01

## 2020-01-01 RX ORDER — LISINOPRIL 2.5 MG/1
1 TABLET ORAL
Qty: 0 | Refills: 0 | DISCHARGE

## 2020-01-01 RX ORDER — TRIAMCINOLONE ACETONIDE 1 MG/G
0.1 OINTMENT TOPICAL
Qty: 1 | Refills: 1 | Status: COMPLETED | COMMUNITY
Start: 2019-05-24 | End: 2020-01-01

## 2020-01-01 RX ORDER — METRONIDAZOLE 500 MG/1
500 TABLET ORAL
Qty: 3 | Refills: 0 | Status: DISCONTINUED | COMMUNITY
Start: 2020-01-01 | End: 2020-01-01

## 2020-01-01 RX ORDER — MORPHINE SULFATE 50 MG/1
2 CAPSULE, EXTENDED RELEASE ORAL ONCE
Refills: 0 | Status: DISCONTINUED | OUTPATIENT
Start: 2020-01-01 | End: 2020-01-01

## 2020-01-01 RX ORDER — HYDROXYCHLOROQUINE SULFATE 200 MG
TABLET ORAL
Refills: 0 | Status: COMPLETED | OUTPATIENT
Start: 2020-01-01 | End: 2020-01-01

## 2020-01-01 RX ORDER — HYDROXYCHLOROQUINE SULFATE 200 MG
800 TABLET ORAL EVERY 24 HOURS
Refills: 0 | Status: COMPLETED | OUTPATIENT
Start: 2020-01-01 | End: 2020-01-01

## 2020-01-01 RX ORDER — SODIUM CHLORIDE 9 MG/ML
1 INJECTION INTRAMUSCULAR; INTRAVENOUS; SUBCUTANEOUS
Refills: 0 | Status: DISCONTINUED | OUTPATIENT
Start: 2020-01-01 | End: 2020-01-01

## 2020-01-01 RX ORDER — AMLODIPINE BESYLATE 2.5 MG/1
5 TABLET ORAL DAILY
Refills: 0 | Status: DISCONTINUED | OUTPATIENT
Start: 2020-01-01 | End: 2020-01-01

## 2020-01-01 RX ADMIN — PIPERACILLIN AND TAZOBACTAM 25 GRAM(S): 4; .5 INJECTION, POWDER, LYOPHILIZED, FOR SOLUTION INTRAVENOUS at 06:35

## 2020-01-01 RX ADMIN — ALBUTEROL 2 PUFF(S): 90 AEROSOL, METERED ORAL at 20:55

## 2020-01-01 RX ADMIN — ENOXAPARIN SODIUM 40 MILLIGRAM(S): 100 INJECTION SUBCUTANEOUS at 13:16

## 2020-01-01 RX ADMIN — ALBUTEROL 2 PUFF(S): 90 AEROSOL, METERED ORAL at 04:18

## 2020-01-01 RX ADMIN — SODIUM CHLORIDE 2 GRAM(S): 9 INJECTION INTRAMUSCULAR; INTRAVENOUS; SUBCUTANEOUS at 18:20

## 2020-01-01 RX ADMIN — SODIUM CHLORIDE 2 GRAM(S): 9 INJECTION INTRAMUSCULAR; INTRAVENOUS; SUBCUTANEOUS at 18:08

## 2020-01-01 RX ADMIN — SODIUM CHLORIDE 75 MILLILITER(S): 9 INJECTION, SOLUTION INTRAVENOUS at 11:15

## 2020-01-01 RX ADMIN — SODIUM CHLORIDE 2 GRAM(S): 9 INJECTION INTRAMUSCULAR; INTRAVENOUS; SUBCUTANEOUS at 16:55

## 2020-01-01 RX ADMIN — SODIUM CHLORIDE 1 GRAM(S): 9 INJECTION INTRAMUSCULAR; INTRAVENOUS; SUBCUTANEOUS at 18:02

## 2020-01-01 RX ADMIN — ALBUTEROL 2 PUFF(S): 90 AEROSOL, METERED ORAL at 05:43

## 2020-01-01 RX ADMIN — HEPARIN SODIUM 5000 UNIT(S): 5000 INJECTION INTRAVENOUS; SUBCUTANEOUS at 11:36

## 2020-01-01 RX ADMIN — Medication 650 MILLIGRAM(S): at 18:57

## 2020-01-01 RX ADMIN — SODIUM CHLORIDE 2 GRAM(S): 9 INJECTION INTRAMUSCULAR; INTRAVENOUS; SUBCUTANEOUS at 05:16

## 2020-01-01 RX ADMIN — Medication 650 MILLIGRAM(S): at 18:03

## 2020-01-01 RX ADMIN — Medication 81 MILLIGRAM(S): at 10:54

## 2020-01-01 RX ADMIN — ALBUTEROL 2 PUFF(S): 90 AEROSOL, METERED ORAL at 10:40

## 2020-01-01 RX ADMIN — Medication 1 PACKET(S): at 22:28

## 2020-01-01 RX ADMIN — Medication 81 MILLIGRAM(S): at 11:20

## 2020-01-01 RX ADMIN — SODIUM CHLORIDE 75 MILLILITER(S): 9 INJECTION, SOLUTION INTRAVENOUS at 13:41

## 2020-01-01 RX ADMIN — PIPERACILLIN AND TAZOBACTAM 25 GRAM(S): 4; .5 INJECTION, POWDER, LYOPHILIZED, FOR SOLUTION INTRAVENOUS at 04:09

## 2020-01-01 RX ADMIN — ALBUTEROL 2 PUFF(S): 90 AEROSOL, METERED ORAL at 05:24

## 2020-01-01 RX ADMIN — ALBUTEROL 2 PUFF(S): 90 AEROSOL, METERED ORAL at 18:20

## 2020-01-01 RX ADMIN — ALBUTEROL 2 PUFF(S): 90 AEROSOL, METERED ORAL at 22:27

## 2020-01-01 RX ADMIN — Medication 500 MILLIGRAM(S): at 05:55

## 2020-01-01 RX ADMIN — ENOXAPARIN SODIUM 40 MILLIGRAM(S): 100 INJECTION SUBCUTANEOUS at 13:09

## 2020-01-01 RX ADMIN — Medication 81 MILLIGRAM(S): at 12:20

## 2020-01-01 RX ADMIN — Medication 50 GRAM(S): at 13:41

## 2020-01-01 RX ADMIN — ENOXAPARIN SODIUM 40 MILLIGRAM(S): 100 INJECTION SUBCUTANEOUS at 12:20

## 2020-01-01 RX ADMIN — ALBUTEROL 2 PUFF(S): 90 AEROSOL, METERED ORAL at 21:47

## 2020-01-01 RX ADMIN — SODIUM CHLORIDE 2000 MILLILITER(S): 9 INJECTION, SOLUTION INTRAVENOUS at 13:45

## 2020-01-01 RX ADMIN — SODIUM CHLORIDE 2 GRAM(S): 9 INJECTION INTRAMUSCULAR; INTRAVENOUS; SUBCUTANEOUS at 00:26

## 2020-01-01 RX ADMIN — ALBUTEROL 2 PUFF(S): 90 AEROSOL, METERED ORAL at 13:07

## 2020-01-01 RX ADMIN — ENOXAPARIN SODIUM 40 MILLIGRAM(S): 100 INJECTION SUBCUTANEOUS at 12:33

## 2020-01-01 RX ADMIN — ALBUTEROL 2 PUFF(S): 90 AEROSOL, METERED ORAL at 12:48

## 2020-01-01 RX ADMIN — SODIUM CHLORIDE 1 GRAM(S): 9 INJECTION INTRAMUSCULAR; INTRAVENOUS; SUBCUTANEOUS at 05:57

## 2020-01-01 RX ADMIN — ALBUTEROL 2 PUFF(S): 90 AEROSOL, METERED ORAL at 22:57

## 2020-01-01 RX ADMIN — Medication 81 MILLIGRAM(S): at 11:50

## 2020-01-01 RX ADMIN — HEPARIN SODIUM 5000 UNIT(S): 5000 INJECTION INTRAVENOUS; SUBCUTANEOUS at 05:19

## 2020-01-01 RX ADMIN — ENOXAPARIN SODIUM 40 MILLIGRAM(S): 100 INJECTION SUBCUTANEOUS at 11:20

## 2020-01-01 RX ADMIN — LISINOPRIL 10 MILLIGRAM(S): 2.5 TABLET ORAL at 06:30

## 2020-01-01 RX ADMIN — ALBUTEROL 2 PUFF(S): 90 AEROSOL, METERED ORAL at 04:29

## 2020-01-01 RX ADMIN — ALBUTEROL 2 PUFF(S): 90 AEROSOL, METERED ORAL at 22:05

## 2020-01-01 RX ADMIN — Medication 500 MILLIGRAM(S): at 05:18

## 2020-01-01 RX ADMIN — PIPERACILLIN AND TAZOBACTAM 25 GRAM(S): 4; .5 INJECTION, POWDER, LYOPHILIZED, FOR SOLUTION INTRAVENOUS at 22:52

## 2020-01-01 RX ADMIN — SODIUM CHLORIDE 2 GRAM(S): 9 INJECTION INTRAMUSCULAR; INTRAVENOUS; SUBCUTANEOUS at 05:19

## 2020-01-01 RX ADMIN — SODIUM CHLORIDE 2 GRAM(S): 9 INJECTION INTRAMUSCULAR; INTRAVENOUS; SUBCUTANEOUS at 17:41

## 2020-01-01 RX ADMIN — ALBUTEROL 2 PUFF(S): 90 AEROSOL, METERED ORAL at 04:09

## 2020-01-01 RX ADMIN — HEPARIN SODIUM 5000 UNIT(S): 5000 INJECTION INTRAVENOUS; SUBCUTANEOUS at 05:38

## 2020-01-01 RX ADMIN — ALBUTEROL 2 PUFF(S): 90 AEROSOL, METERED ORAL at 15:29

## 2020-01-01 RX ADMIN — HEPARIN SODIUM 5000 UNIT(S): 5000 INJECTION INTRAVENOUS; SUBCUTANEOUS at 21:43

## 2020-01-01 RX ADMIN — SODIUM CHLORIDE 1 GRAM(S): 9 INJECTION INTRAMUSCULAR; INTRAVENOUS; SUBCUTANEOUS at 16:41

## 2020-01-01 RX ADMIN — ALBUTEROL 2 PUFF(S): 90 AEROSOL, METERED ORAL at 22:44

## 2020-01-01 RX ADMIN — SODIUM CHLORIDE 2 GRAM(S): 9 INJECTION INTRAMUSCULAR; INTRAVENOUS; SUBCUTANEOUS at 06:36

## 2020-01-01 RX ADMIN — SODIUM CHLORIDE 2 GRAM(S): 9 INJECTION INTRAMUSCULAR; INTRAVENOUS; SUBCUTANEOUS at 05:24

## 2020-01-01 RX ADMIN — ALBUTEROL 2 PUFF(S): 90 AEROSOL, METERED ORAL at 09:16

## 2020-01-01 RX ADMIN — ALBUTEROL 2 PUFF(S): 90 AEROSOL, METERED ORAL at 09:32

## 2020-01-01 RX ADMIN — HYDROMORPHONE HYDROCHLORIDE 30 MILLILITER(S): 2 INJECTION INTRAMUSCULAR; INTRAVENOUS; SUBCUTANEOUS at 20:39

## 2020-01-01 RX ADMIN — Medication 40 MILLIEQUIVALENT(S): at 09:59

## 2020-01-01 RX ADMIN — SODIUM CHLORIDE 2 GRAM(S): 9 INJECTION INTRAMUSCULAR; INTRAVENOUS; SUBCUTANEOUS at 17:47

## 2020-01-01 RX ADMIN — HEPARIN SODIUM 5000 UNIT(S): 5000 INJECTION INTRAVENOUS; SUBCUTANEOUS at 22:28

## 2020-01-01 RX ADMIN — SODIUM CHLORIDE 2 GRAM(S): 9 INJECTION INTRAMUSCULAR; INTRAVENOUS; SUBCUTANEOUS at 05:46

## 2020-01-01 RX ADMIN — SODIUM CHLORIDE 2 GRAM(S): 9 INJECTION INTRAMUSCULAR; INTRAVENOUS; SUBCUTANEOUS at 04:46

## 2020-01-01 RX ADMIN — HYDROMORPHONE HYDROCHLORIDE 30 MILLILITER(S): 2 INJECTION INTRAMUSCULAR; INTRAVENOUS; SUBCUTANEOUS at 07:23

## 2020-01-01 RX ADMIN — ALBUTEROL 2 PUFF(S): 90 AEROSOL, METERED ORAL at 16:55

## 2020-01-01 RX ADMIN — HEPARIN SODIUM 5000 UNIT(S): 5000 INJECTION INTRAVENOUS; SUBCUTANEOUS at 05:57

## 2020-01-01 RX ADMIN — PIPERACILLIN AND TAZOBACTAM 25 GRAM(S): 4; .5 INJECTION, POWDER, LYOPHILIZED, FOR SOLUTION INTRAVENOUS at 12:05

## 2020-01-01 RX ADMIN — HEPARIN SODIUM 5000 UNIT(S): 5000 INJECTION INTRAVENOUS; SUBCUTANEOUS at 13:18

## 2020-01-01 RX ADMIN — PIPERACILLIN AND TAZOBACTAM 200 GRAM(S): 4; .5 INJECTION, POWDER, LYOPHILIZED, FOR SOLUTION INTRAVENOUS at 21:14

## 2020-01-01 RX ADMIN — SODIUM CHLORIDE 2 GRAM(S): 9 INJECTION INTRAMUSCULAR; INTRAVENOUS; SUBCUTANEOUS at 04:37

## 2020-01-01 RX ADMIN — ALBUTEROL 2 PUFF(S): 90 AEROSOL, METERED ORAL at 11:51

## 2020-01-01 RX ADMIN — Medication 81 MILLIGRAM(S): at 11:33

## 2020-01-01 RX ADMIN — Medication 10 MILLIGRAM(S): at 15:13

## 2020-01-01 RX ADMIN — SODIUM CHLORIDE 2 GRAM(S): 9 INJECTION INTRAMUSCULAR; INTRAVENOUS; SUBCUTANEOUS at 22:44

## 2020-01-01 RX ADMIN — ALBUTEROL 2 PUFF(S): 90 AEROSOL, METERED ORAL at 05:17

## 2020-01-01 RX ADMIN — AMLODIPINE BESYLATE 5 MILLIGRAM(S): 2.5 TABLET ORAL at 06:30

## 2020-01-01 RX ADMIN — Medication 81 MILLIGRAM(S): at 11:38

## 2020-01-01 RX ADMIN — Medication 1 PACKET(S): at 18:20

## 2020-01-01 RX ADMIN — Medication 400 MILLIGRAM(S): at 21:43

## 2020-01-01 RX ADMIN — ALBUTEROL 2 PUFF(S): 90 AEROSOL, METERED ORAL at 11:41

## 2020-01-01 RX ADMIN — ALBUTEROL 2 PUFF(S): 90 AEROSOL, METERED ORAL at 10:53

## 2020-01-01 RX ADMIN — ENOXAPARIN SODIUM 40 MILLIGRAM(S): 100 INJECTION SUBCUTANEOUS at 10:54

## 2020-01-01 RX ADMIN — SODIUM CHLORIDE 2 GRAM(S): 9 INJECTION INTRAMUSCULAR; INTRAVENOUS; SUBCUTANEOUS at 13:09

## 2020-01-01 RX ADMIN — PIPERACILLIN AND TAZOBACTAM 25 GRAM(S): 4; .5 INJECTION, POWDER, LYOPHILIZED, FOR SOLUTION INTRAVENOUS at 05:19

## 2020-01-01 RX ADMIN — ALBUTEROL 2 PUFF(S): 90 AEROSOL, METERED ORAL at 05:55

## 2020-01-01 RX ADMIN — Medication 400 MILLIGRAM(S): at 15:20

## 2020-01-01 RX ADMIN — SODIUM CHLORIDE 2 GRAM(S): 9 INJECTION INTRAMUSCULAR; INTRAVENOUS; SUBCUTANEOUS at 04:10

## 2020-01-01 RX ADMIN — PIPERACILLIN AND TAZOBACTAM 25 GRAM(S): 4; .5 INJECTION, POWDER, LYOPHILIZED, FOR SOLUTION INTRAVENOUS at 06:44

## 2020-01-01 RX ADMIN — ALBUTEROL 2 PUFF(S): 90 AEROSOL, METERED ORAL at 10:11

## 2020-01-01 RX ADMIN — ALBUTEROL 2 PUFF(S): 90 AEROSOL, METERED ORAL at 11:19

## 2020-01-01 RX ADMIN — Medication 40 MILLIEQUIVALENT(S): at 22:59

## 2020-01-01 RX ADMIN — ENOXAPARIN SODIUM 40 MILLIGRAM(S): 100 INJECTION SUBCUTANEOUS at 12:48

## 2020-01-01 RX ADMIN — PIPERACILLIN AND TAZOBACTAM 25 GRAM(S): 4; .5 INJECTION, POWDER, LYOPHILIZED, FOR SOLUTION INTRAVENOUS at 13:08

## 2020-01-01 RX ADMIN — SODIUM CHLORIDE 2 GRAM(S): 9 INJECTION INTRAMUSCULAR; INTRAVENOUS; SUBCUTANEOUS at 17:13

## 2020-01-01 RX ADMIN — PIPERACILLIN AND TAZOBACTAM 25 GRAM(S): 4; .5 INJECTION, POWDER, LYOPHILIZED, FOR SOLUTION INTRAVENOUS at 05:57

## 2020-01-01 RX ADMIN — HEPARIN SODIUM 5000 UNIT(S): 5000 INJECTION INTRAVENOUS; SUBCUTANEOUS at 04:05

## 2020-01-01 RX ADMIN — PIPERACILLIN AND TAZOBACTAM 25 GRAM(S): 4; .5 INJECTION, POWDER, LYOPHILIZED, FOR SOLUTION INTRAVENOUS at 22:27

## 2020-01-01 RX ADMIN — Medication 81 MILLIGRAM(S): at 14:12

## 2020-01-01 RX ADMIN — Medication 10 MILLIGRAM(S): at 05:29

## 2020-01-01 RX ADMIN — PIPERACILLIN AND TAZOBACTAM 200 GRAM(S): 4; .5 INJECTION, POWDER, LYOPHILIZED, FOR SOLUTION INTRAVENOUS at 19:57

## 2020-01-01 RX ADMIN — ALBUTEROL 2 PUFF(S): 90 AEROSOL, METERED ORAL at 05:15

## 2020-01-01 RX ADMIN — HEPARIN SODIUM 5000 UNIT(S): 5000 INJECTION INTRAVENOUS; SUBCUTANEOUS at 21:49

## 2020-01-01 RX ADMIN — Medication 1: at 15:44

## 2020-01-01 RX ADMIN — SODIUM CHLORIDE 1 GRAM(S): 9 INJECTION INTRAMUSCULAR; INTRAVENOUS; SUBCUTANEOUS at 16:16

## 2020-01-01 RX ADMIN — ALBUTEROL 2 PUFF(S): 90 AEROSOL, METERED ORAL at 09:59

## 2020-01-01 RX ADMIN — Medication 500 MILLIGRAM(S): at 16:59

## 2020-01-01 RX ADMIN — SODIUM CHLORIDE 75 MILLILITER(S): 9 INJECTION INTRAMUSCULAR; INTRAVENOUS; SUBCUTANEOUS at 18:02

## 2020-01-01 RX ADMIN — Medication 40 MILLIEQUIVALENT(S): at 18:20

## 2020-01-01 RX ADMIN — ENOXAPARIN SODIUM 30 MILLIGRAM(S): 100 INJECTION SUBCUTANEOUS at 11:40

## 2020-01-01 RX ADMIN — ENOXAPARIN SODIUM 40 MILLIGRAM(S): 100 INJECTION SUBCUTANEOUS at 11:44

## 2020-01-01 RX ADMIN — SODIUM CHLORIDE 100 MILLILITER(S): 9 INJECTION INTRAMUSCULAR; INTRAVENOUS; SUBCUTANEOUS at 02:04

## 2020-01-01 RX ADMIN — ENOXAPARIN SODIUM 40 MILLIGRAM(S): 100 INJECTION SUBCUTANEOUS at 11:50

## 2020-01-01 RX ADMIN — Medication 50 GRAM(S): at 11:19

## 2020-01-01 RX ADMIN — Medication 1: at 12:04

## 2020-01-01 RX ADMIN — HEPARIN SODIUM 5000 UNIT(S): 5000 INJECTION INTRAVENOUS; SUBCUTANEOUS at 22:52

## 2020-01-01 RX ADMIN — ALBUTEROL 2 PUFF(S): 90 AEROSOL, METERED ORAL at 16:58

## 2020-01-01 RX ADMIN — Medication 85 MILLIMOLE(S): at 18:02

## 2020-01-01 RX ADMIN — ALBUTEROL 2 PUFF(S): 90 AEROSOL, METERED ORAL at 21:30

## 2020-01-01 RX ADMIN — HEPARIN SODIUM 5000 UNIT(S): 5000 INJECTION INTRAVENOUS; SUBCUTANEOUS at 13:08

## 2020-01-01 RX ADMIN — LISINOPRIL 10 MILLIGRAM(S): 2.5 TABLET ORAL at 16:55

## 2020-01-01 RX ADMIN — PIPERACILLIN AND TAZOBACTAM 25 GRAM(S): 4; .5 INJECTION, POWDER, LYOPHILIZED, FOR SOLUTION INTRAVENOUS at 21:43

## 2020-01-01 RX ADMIN — PIPERACILLIN AND TAZOBACTAM 25 GRAM(S): 4; .5 INJECTION, POWDER, LYOPHILIZED, FOR SOLUTION INTRAVENOUS at 14:14

## 2020-01-01 RX ADMIN — SODIUM CHLORIDE 2 GRAM(S): 9 INJECTION INTRAMUSCULAR; INTRAVENOUS; SUBCUTANEOUS at 14:15

## 2020-01-01 RX ADMIN — Medication 81 MILLIGRAM(S): at 12:49

## 2020-01-01 RX ADMIN — Medication 40 MILLIEQUIVALENT(S): at 15:29

## 2020-01-01 RX ADMIN — HEPARIN SODIUM 5000 UNIT(S): 5000 INJECTION INTRAVENOUS; SUBCUTANEOUS at 00:26

## 2020-01-01 RX ADMIN — ALBUTEROL 2 PUFF(S): 90 AEROSOL, METERED ORAL at 17:47

## 2020-01-01 RX ADMIN — HYDROMORPHONE HYDROCHLORIDE 30 MILLILITER(S): 2 INJECTION INTRAMUSCULAR; INTRAVENOUS; SUBCUTANEOUS at 12:58

## 2020-01-01 RX ADMIN — HYDROMORPHONE HYDROCHLORIDE 30 MILLILITER(S): 2 INJECTION INTRAMUSCULAR; INTRAVENOUS; SUBCUTANEOUS at 08:20

## 2020-01-01 RX ADMIN — SODIUM CHLORIDE 1 GRAM(S): 9 INJECTION INTRAMUSCULAR; INTRAVENOUS; SUBCUTANEOUS at 04:05

## 2020-01-01 RX ADMIN — SODIUM CHLORIDE 2 GRAM(S): 9 INJECTION INTRAMUSCULAR; INTRAVENOUS; SUBCUTANEOUS at 05:58

## 2020-01-01 RX ADMIN — PIPERACILLIN AND TAZOBACTAM 25 GRAM(S): 4; .5 INJECTION, POWDER, LYOPHILIZED, FOR SOLUTION INTRAVENOUS at 00:26

## 2020-01-01 RX ADMIN — Medication 81 MILLIGRAM(S): at 13:09

## 2020-01-01 RX ADMIN — LISINOPRIL 10 MILLIGRAM(S): 2.5 TABLET ORAL at 04:29

## 2020-01-01 RX ADMIN — ENOXAPARIN SODIUM 40 MILLIGRAM(S): 100 INJECTION SUBCUTANEOUS at 10:17

## 2020-01-01 RX ADMIN — SODIUM CHLORIDE 2 GRAM(S): 9 INJECTION INTRAMUSCULAR; INTRAVENOUS; SUBCUTANEOUS at 20:40

## 2020-01-01 RX ADMIN — SODIUM CHLORIDE 500 MILLILITER(S): 9 INJECTION INTRAMUSCULAR; INTRAVENOUS; SUBCUTANEOUS at 15:00

## 2020-01-01 RX ADMIN — Medication 400 MILLIGRAM(S): at 15:29

## 2020-01-01 RX ADMIN — HEPARIN SODIUM 5000 UNIT(S): 5000 INJECTION INTRAVENOUS; SUBCUTANEOUS at 12:05

## 2020-01-01 RX ADMIN — ENOXAPARIN SODIUM 40 MILLIGRAM(S): 100 INJECTION SUBCUTANEOUS at 12:23

## 2020-01-01 RX ADMIN — ALBUTEROL 2 PUFF(S): 90 AEROSOL, METERED ORAL at 17:13

## 2020-01-01 RX ADMIN — Medication 40 MILLIEQUIVALENT(S): at 08:45

## 2020-01-01 RX ADMIN — SODIUM CHLORIDE 2 GRAM(S): 9 INJECTION INTRAMUSCULAR; INTRAVENOUS; SUBCUTANEOUS at 16:42

## 2020-01-01 RX ADMIN — SODIUM CHLORIDE 2 GRAM(S): 9 INJECTION INTRAMUSCULAR; INTRAVENOUS; SUBCUTANEOUS at 11:36

## 2020-01-01 RX ADMIN — Medication 5 MILLIGRAM(S): at 11:39

## 2020-01-01 RX ADMIN — SODIUM CHLORIDE 2 GRAM(S): 9 INJECTION INTRAMUSCULAR; INTRAVENOUS; SUBCUTANEOUS at 05:18

## 2020-01-01 RX ADMIN — Medication 81 MILLIGRAM(S): at 13:08

## 2020-01-01 RX ADMIN — ALBUTEROL 2 PUFF(S): 90 AEROSOL, METERED ORAL at 18:13

## 2020-01-01 RX ADMIN — Medication 40 MILLIEQUIVALENT(S): at 10:16

## 2020-01-01 RX ADMIN — SODIUM CHLORIDE 2 GRAM(S): 9 INJECTION INTRAMUSCULAR; INTRAVENOUS; SUBCUTANEOUS at 22:15

## 2020-01-01 RX ADMIN — ALBUTEROL 2 PUFF(S): 90 AEROSOL, METERED ORAL at 16:43

## 2020-01-01 RX ADMIN — SODIUM CHLORIDE 75 MILLILITER(S): 9 INJECTION INTRAMUSCULAR; INTRAVENOUS; SUBCUTANEOUS at 15:00

## 2020-01-01 RX ADMIN — SODIUM CHLORIDE 2 GRAM(S): 9 INJECTION INTRAMUSCULAR; INTRAVENOUS; SUBCUTANEOUS at 05:55

## 2020-01-01 RX ADMIN — Medication 500 MILLIGRAM(S): at 16:43

## 2020-01-01 RX ADMIN — MORPHINE SULFATE 2 MILLIGRAM(S): 50 CAPSULE, EXTENDED RELEASE ORAL at 21:14

## 2020-01-01 RX ADMIN — Medication 81 MILLIGRAM(S): at 12:33

## 2020-01-01 RX ADMIN — ALBUTEROL 2 PUFF(S): 90 AEROSOL, METERED ORAL at 21:36

## 2020-01-01 RX ADMIN — SODIUM CHLORIDE 2 GRAM(S): 9 INJECTION INTRAMUSCULAR; INTRAVENOUS; SUBCUTANEOUS at 22:56

## 2020-01-01 RX ADMIN — HEPARIN SODIUM 5000 UNIT(S): 5000 INJECTION INTRAVENOUS; SUBCUTANEOUS at 14:15

## 2020-01-01 RX ADMIN — ALBUTEROL 2 PUFF(S): 90 AEROSOL, METERED ORAL at 04:46

## 2020-01-01 RX ADMIN — LISINOPRIL 10 MILLIGRAM(S): 2.5 TABLET ORAL at 13:40

## 2020-01-01 RX ADMIN — LISINOPRIL 10 MILLIGRAM(S): 2.5 TABLET ORAL at 04:37

## 2020-01-01 RX ADMIN — Medication 800 MILLIGRAM(S): at 16:58

## 2020-01-01 RX ADMIN — SODIUM CHLORIDE 75 MILLILITER(S): 9 INJECTION INTRAMUSCULAR; INTRAVENOUS; SUBCUTANEOUS at 12:58

## 2020-01-01 RX ADMIN — ALBUTEROL 2 PUFF(S): 90 AEROSOL, METERED ORAL at 18:08

## 2020-01-01 RX ADMIN — LISINOPRIL 10 MILLIGRAM(S): 2.5 TABLET ORAL at 04:46

## 2020-01-01 RX ADMIN — Medication 81 MILLIGRAM(S): at 11:41

## 2020-01-01 RX ADMIN — ALBUTEROL 2 PUFF(S): 90 AEROSOL, METERED ORAL at 05:58

## 2020-01-01 RX ADMIN — Medication 81 MILLIGRAM(S): at 11:36

## 2020-01-01 RX ADMIN — SODIUM CHLORIDE 2 GRAM(S): 9 INJECTION INTRAMUSCULAR; INTRAVENOUS; SUBCUTANEOUS at 05:43

## 2020-01-01 RX ADMIN — Medication 400 MILLIGRAM(S): at 16:42

## 2020-01-01 RX ADMIN — ALBUTEROL 2 PUFF(S): 90 AEROSOL, METERED ORAL at 20:39

## 2020-01-01 RX ADMIN — Medication 500 MILLIGRAM(S): at 17:00

## 2020-01-01 RX ADMIN — Medication 81 MILLIGRAM(S): at 12:23

## 2020-01-01 RX ADMIN — SODIUM CHLORIDE 2 GRAM(S): 9 INJECTION INTRAMUSCULAR; INTRAVENOUS; SUBCUTANEOUS at 22:28

## 2020-01-01 RX ADMIN — ALBUTEROL 2 PUFF(S): 90 AEROSOL, METERED ORAL at 10:05

## 2020-01-01 RX ADMIN — ALBUTEROL 2 PUFF(S): 90 AEROSOL, METERED ORAL at 18:28

## 2020-01-01 RX ADMIN — Medication 81 MILLIGRAM(S): at 13:16

## 2020-01-01 RX ADMIN — HEPARIN SODIUM 5000 UNIT(S): 5000 INJECTION INTRAVENOUS; SUBCUTANEOUS at 06:35

## 2020-01-01 RX ADMIN — Medication 40 MILLIEQUIVALENT(S): at 18:09

## 2020-01-01 RX ADMIN — Medication 1: at 11:58

## 2020-01-01 RX ADMIN — SODIUM CHLORIDE 1000 MILLILITER(S): 9 INJECTION INTRAMUSCULAR; INTRAVENOUS; SUBCUTANEOUS at 21:14

## 2020-01-01 RX ADMIN — ENOXAPARIN SODIUM 30 MILLIGRAM(S): 100 INJECTION SUBCUTANEOUS at 11:39

## 2020-01-01 RX ADMIN — Medication 20 MILLIEQUIVALENT(S): at 13:40

## 2020-01-01 RX ADMIN — SODIUM CHLORIDE 2 GRAM(S): 9 INJECTION INTRAMUSCULAR; INTRAVENOUS; SUBCUTANEOUS at 18:13

## 2020-01-01 RX ADMIN — Medication 81 MILLIGRAM(S): at 12:04

## 2020-01-01 RX ADMIN — SODIUM CHLORIDE 2 GRAM(S): 9 INJECTION INTRAMUSCULAR; INTRAVENOUS; SUBCUTANEOUS at 18:28

## 2020-01-01 RX ADMIN — Medication 81 MILLIGRAM(S): at 10:16

## 2020-01-01 RX ADMIN — ALBUTEROL 2 PUFF(S): 90 AEROSOL, METERED ORAL at 05:45

## 2020-01-01 RX ADMIN — ALBUTEROL 2 PUFF(S): 90 AEROSOL, METERED ORAL at 04:37

## 2020-01-01 RX ADMIN — ALBUTEROL 2 PUFF(S): 90 AEROSOL, METERED ORAL at 21:08

## 2020-01-01 RX ADMIN — SODIUM CHLORIDE 125 MILLILITER(S): 9 INJECTION INTRAMUSCULAR; INTRAVENOUS; SUBCUTANEOUS at 11:19

## 2020-01-01 RX ADMIN — ALBUTEROL 2 PUFF(S): 90 AEROSOL, METERED ORAL at 21:43

## 2020-01-01 RX ADMIN — ALBUTEROL 2 PUFF(S): 90 AEROSOL, METERED ORAL at 22:15

## 2020-01-22 NOTE — HISTORY OF PRESENT ILLNESS
[Disease: _____________________] : Disease: [unfilled] [M: ___] : M[unfilled] [AJCC Stage: ____] : AJCC Stage: [unfilled] [de-identified] : Yobani is a 73 y/o male diagnosed with metastatic GI adenocarcinoma of descending colon presumed s/p diverting colostomy and mucus fistula in January and May 2019.  Colonoscopy was attempted but unable to biopsy the primary site or reach it.  He feels well overall and denies any new complaints. He denies n/v/d/c, fatigue, CP, abdominal pain, palpitations, LOPEZ.  His neuropathy persists is more numbness/mild pain but does not interfere with ADLs.  He is planning to see Dr Jones to discuss reversal soon.  \par \par  [de-identified] : CEA  [de-identified] : pan-edith wild type and left side primary\par \par  [FreeTextEntry1] : Palliative FOLFOX (Vectibix added cycle 2) - Oxaliplatin held after cycle 8 --->  s/p cycle 13 [de-identified] : Yobani comes in for follow up while on 5FU and Vectibix  a and case was discuused at tumor board and consensus was to hold chemo and proceed with debulking

## 2020-01-22 NOTE — REVIEW OF SYSTEMS
[Fatigue] : fatigue [Recent Change In Weight] : ~T no recent weight change [Chest Pain] : no chest pain [Mucosal Pain] : no mucosal pain [Abdominal Pain] : no abdominal pain [Lower Ext Edema] : no lower extremity edema [Vomiting] : no vomiting [Diarrhea] : no diarrhea [Skin Rash] : skin rash [Constipation] : no constipation [Negative] : Endocrine

## 2020-01-22 NOTE — PHYSICAL EXAM
[Restricted in physically strenuous activity but ambulatory and able to carry out work of a light or sedentary nature] : Status 1- Restricted in physically strenuous activity but ambulatory and able to carry out work of a light or sedentary nature, e.g., light house work, office work [Normal] : normal appearance, no rash, nodules, vesicles, ulcers, erythema [de-identified] : port   [de-identified] : ostomy , mucus fistula,  [de-identified] : R knee pain [de-identified] : dry skin

## 2020-01-28 NOTE — ASSESSMENT
[FreeTextEntry1] : Colon cancer with peritoneal disease\par -we discussed the planned Procedure with sigmoid resection and tumor debulking. We discussed risks and benefits of the procedure including bleeding, infection, possible need for proximal ileostomy. We also discussed that this is likely not a cure for his cancer.\par -All questions were answered\par -Patient wishes to proceed and we'll schedule the procedure at his convenience\par -Will hold chemotherapy for 6 weeks prior to surgery\par -Bowel prep to be given

## 2020-01-28 NOTE — HISTORY OF PRESENT ILLNESS
[FreeTextEntry1] : 74-year-old male with sigmoid colon cancer and peritoneal disease. Patient has been improving well with chemotherapy. Recently presented at tumor Board and was decided to attempt tumor debulking And colostomy reversal. Patient presents today to discuss surgery. Last chemotherapy was one week ago. Denies fevers or chills. Gaining weight. Stoma function within normal limits. Tolerating diet

## 2020-02-10 NOTE — PHYSICAL THERAPY INITIAL EVALUATION ADULT - ADDITIONAL COMMENTS
Pt reports that he lives in a private house with children and grandchild with ~3STE; (+)bilateral handrails far apart; and a flight of stairs to negotiate to bedroom. Prior to hospital admission pt was completely independent and used no assistive device with ambulation. Pt denies any recent falls.    Pt left comfortable in chair, NAD, all lines intact, all precautions maintained, with call bell in reach, family @bedside, and RN aware of PT evaluation. Pt reports that he lives in a private house with children and grandchild with ~3STE; (+)bilateral handrails far apart; and a flight of stairs to negotiate to bedroom; (+)1 handrail. Prior to hospital admission pt was completely independent and used no assistive device with ambulation. Pt denies any recent falls.    Pt left comfortable in chair, NAD, all lines intact, all precautions maintained, with call bell in reach, family @bedside, and RN aware of PT evaluation.

## 2020-02-27 NOTE — H&P PST ADULT - NEGATIVE GENERAL SYMPTOMS
no weight gain/no polyphagia/no polyuria/no malaise/no fatigue/no polydipsia/no chills/no sweating/no weight loss/no fever/no anorexia

## 2020-02-27 NOTE — H&P PST ADULT - NEGATIVE NEUROLOGICAL SYMPTOMS
no transient paralysis/no syncope/no weakness/no tremors/no generalized seizures/no focal seizures/no difficulty walking

## 2020-02-27 NOTE — H&P PST ADULT - GASTROINTESTINAL COMMENTS
right abdominal colostomy, fistula - complaining of mucus discharge, surgeon and oncologist aware colostomy

## 2020-02-27 NOTE — H&P PST ADULT - NEGATIVE OPHTHALMOLOGIC SYMPTOMS
no blurred vision L/no pain L/no photophobia/no loss of vision R/no blurred vision R/no pain R/no loss of vision L/no diplopia

## 2020-02-27 NOTE — H&P PST ADULT - RS GEN PE MLT RESP DETAILS PC
no rhonchi/no wheezes/no rales/respirations non-labored/breath sounds equal/good air movement/clear to auscultation bilaterally/airway patent

## 2020-02-27 NOTE — H&P PST ADULT - NSICDXPASTMEDICALHX_GEN_ALL_CORE_FT
PAST MEDICAL HISTORY:  Benign Essential Hypertension     Diabetes mellitus type 2    Diverticulosis     Hyperlipidemia     Hyponatremia     Prostate Cancer denies chemo and radiation    Renal Cancer denies chemo and radiation

## 2020-02-27 NOTE — H&P PST ADULT - VENOUS THROMBOEMBOLISM CURRENT STATUS
(1) other risk factor (includes escalating BMI, pack-years of smoking, diabetes requiring insulin, chemotherapy, female gender and length of surgery)/(1) history of inflammatory bowel disease (IBD)/(2) malignancy (present or previous)

## 2020-02-27 NOTE — H&P PST ADULT - NEGATIVE GENERAL GENITOURINARY SYMPTOMS
no bladder infections/no flank pain R/no dysuria/no hematuria/no flank pain L/normal urinary frequency

## 2020-02-27 NOTE — H&P PST ADULT - HISTORY OF PRESENT ILLNESS
75 year old male presents to presurgical testing with diagnosis of malignant neoplasm of colon, unspecified scheduled for open sigmoid resection tumor debulking. Pt with history of metastatic GI adenocarcinoma and prostate cancer s/p colostomy placement, chemotherapy, last session in 1/2020, s/p ex lap lysis of adhesions in 5/2019.

## 2020-02-27 NOTE — H&P PST ADULT - NEGATIVE ENMT SYMPTOMS
no ear pain/no tinnitus/no sinus symptoms/no nose bleeds/no throat pain/no dysphagia/no hearing difficulty/no vertigo

## 2020-02-27 NOTE — H&P PST ADULT - MARITAL STATUS
/2 children, mother  96y/o, father  70y/o htn, 1 brother  kidney problems, 1 sister  ovarian problems

## 2020-02-27 NOTE — H&P PST ADULT - NSICDXPROBLEM_GEN_ALL_CORE_FT
PROBLEM DIAGNOSES  Problem: Diverticulosis  Assessment and Plan: Pt scheduled for open low anterior resection reversal of colostomy on 5/3/2019.  labs done results pending, ekg done.  Preop teaching done, pt able to verbalize understanding.    DM pt instructed to stop metformin 48 hrs prior to procedure, last dose 4/30/2019 PROBLEM DIAGNOSES  Problem: Malignant neoplasm of colon  Assessment and Plan: Pt is tentatively scheduled for open sigmoid resection tumor debulking for 3/9/20. Pre-op instructions provided. Pt given verbal and written instructions with teach back on chlorhexidine shampoo and pepcid. Pt verbalized understanding with return demonstration.   Last heme/onc note in chart  JOSSELINE precautions, OR booking notified  chemo port -OR booking notified   Pending medical evaluation due to HTN and DM type 2. Appt 2/26    Problem: Allergy to latex  Assessment and Plan: OR booking notified     Problem: Diabetes mellitus  Assessment and Plan: Pt instructed to take last dose of metformin on 3/8 AM.  PMH Diabetes,  OR booking notified     Problem: Hypertension  Assessment and Plan: Pt instructed to take lisinopril on the morning of procedue

## 2020-02-27 NOTE — H&P PST ADULT - NSICDXPASTSURGICALHX_GEN_ALL_CORE_FT
PAST SURGICAL HISTORY:  Bowel obstruction colostomy placement 1/2019    H/O cystoscopy 5/2019 dilation of urethra    H/O exploratory laparotomy lysis of adhesions, biopsy 5/2019    H/O partial nephrectomy left, 2000- benign tumor    H/O prostatectomy 2001    History of chemotherapy chemo port placement

## 2020-03-09 NOTE — BRIEF OPERATIVE NOTE - NSICDXBRIEFPROCEDURE_GEN_ALL_CORE_FT
PROCEDURES:  Cystoscopy, with ureteral catheterization 09-Mar-2020 09:00:20  Daren Villagomez
PROCEDURES:  Enterolysis 09-Mar-2020 11:03:19  Florentin Gerber  Exploratory laparotomy 09-Mar-2020 11:03:12  Florentin Gerber

## 2020-03-09 NOTE — CHART NOTE - NSCHARTNOTEFT_GEN_A_CORE
Post Operative Check    Patient is post op from a Enterolysis (58294)  Exploratory laparotomy (31624)  Cystoscopy, with ureteral catheterization (61199)   and is feeling well, pain controlled, no nausea, vomiting, SOB    Vitals    T(C): 36.6 (03-09-20 @ 17:43), Max: 37.1 (03-09-20 @ 12:00)  HR: 75 (03-09-20 @ 17:43) (75 - 104)  BP: 133/74 (03-09-20 @ 17:43) (131/76 - 173/69)  RR: 15 (03-09-20 @ 17:43) (12 - 20)  SpO2: 100% (03-09-20 @ 17:43) (96% - 100%)  Wt(kg): --      03-09 @ 07:01  -  03-09 @ 18:42  --------------------------------------------------------  IN:    IV PiggyBack: 1000 mL    lactated ringers.: 375 mL    sodium chloride 0.9%.: 150 mL  Total IN: 1525 mL    OUT:    Indwelling Catheter - Urethral: 360 mL    Voided: 400 mL  Total OUT: 760 mL    Total NET: 765 mL          Physical Exam  General: NAD AAOx3   Cards: RRR S1S2  Resp: CTAB  Abdomen: s/nt/nd, incision is minor saturated, dry, intact. PCEA inplace  : reyes in place, clear yellow  Ext: NTBL    Labs  Labs:  CAPILLARY BLOOD GLUCOSE      POCT Blood Glucose.: 175 mg/dL (09 Mar 2020 15:40)  POCT Blood Glucose.: 99 mg/dL (09 Mar 2020 06:46)                          11.9   11.54 )-----------( 379      ( 09 Mar 2020 11:40 )             35.9         03-09    127<L>  |  93<L>  |  19  ----------------------------<  182<H>  4.4   |  16<L>  |  1.40<H>      eGFR if Non : 48 mL/min (03-09-20 @ 11:40)      LFTs:     Blood Gas Arterial - Lactate: 1.1 mmol/L (03-09-20 @ 09:48)    ABG - ( 09 Mar 2020 09:48 )  pH: 7.45  /  pCO2: 35    /  pO2: 351   / HCO3: 25    / Base Excess: 0.3   /  SaO2: 99.8              Coags:                    Radiology:       Patient is a 75y old Male s/p exlap, TRIP  Plan:  -am labs  -NPO  -oob/ambi  -dvt ppx GILL  -pain control PCEA, d/c tomorrow  -Reyes catheter, TOV am Post Operative Check    Patient is post op from a Enterolysis (87019)  Exploratory laparotomy (61314)  Cystoscopy, with ureteral catheterization (62016)   and is feeling well, pain controlled, no nausea, vomiting, SOB    Vitals    T(C): 36.6 (03-09-20 @ 17:43), Max: 37.1 (03-09-20 @ 12:00)  HR: 75 (03-09-20 @ 17:43) (75 - 104)  BP: 133/74 (03-09-20 @ 17:43) (131/76 - 173/69)  RR: 15 (03-09-20 @ 17:43) (12 - 20)  SpO2: 100% (03-09-20 @ 17:43) (96% - 100%)  Wt(kg): --      03-09 @ 07:01  -  03-09 @ 18:42  --------------------------------------------------------  IN:    IV PiggyBack: 1000 mL    lactated ringers.: 375 mL    sodium chloride 0.9%.: 150 mL  Total IN: 1525 mL    OUT:    Indwelling Catheter - Urethral: 360 mL    Voided: 400 mL  Total OUT: 760 mL    Total NET: 765 mL          Physical Exam  General: NAD AAOx3   Cards: RRR S1S2  Resp: CTAB  Abdomen: s/nt/nd, incision is minor saturated, dry, intact. PCEA inplace  : reyes in place, clear yellow  Ext: NTBL    Labs  Labs:  CAPILLARY BLOOD GLUCOSE      POCT Blood Glucose.: 175 mg/dL (09 Mar 2020 15:40)  POCT Blood Glucose.: 99 mg/dL (09 Mar 2020 06:46)                          11.9   11.54 )-----------( 379      ( 09 Mar 2020 11:40 )             35.9         03-09    127<L>  |  93<L>  |  19  ----------------------------<  182<H>  4.4   |  16<L>  |  1.40<H>      eGFR if Non : 48 mL/min (03-09-20 @ 11:40)      LFTs:     Blood Gas Arterial - Lactate: 1.1 mmol/L (03-09-20 @ 09:48)    ABG - ( 09 Mar 2020 09:48 )  pH: 7.45  /  pCO2: 35    /  pO2: 351   / HCO3: 25    / Base Excess: 0.3   /  SaO2: 99.8              Coags:                    Radiology:       Patient is a 75y old Male s/p exlap, TRIP  Plan:  -am labs  -CLD adv to Reg tomorrow  -oob/ambi  -dvt ppx GILL  -pain control PCEA, d/c tomorrow  -Reyes catheter, TOV am

## 2020-03-09 NOTE — BRIEF OPERATIVE NOTE - OPERATION/FINDINGS
Bilateral ureteral catheters placed   WHITE - left  YELLOW - right
Exlap, lysis of adhesions, large bulky retroperitoneal adenopathy noted, upper rectum firm mass with dense adhesions to lateral pelvic wall, small bowel, and bladder. Unresectable. No resection performed. Fascia closed with #1 maxon. Skin stapled.

## 2020-03-10 NOTE — PROVIDER CONTACT NOTE (OTHER) - ASSESSMENT
Patient A&Ox4. PCEA wire disconnected from pump catheter, insertion site asymptomatic, no signs of leakage. PCEA pump stopped.  Reyes catheter removed around 6:59am by MD/team, patient voided 50ml of blood tinged urine post reeys removal (FRAN Sierra notified).

## 2020-03-10 NOTE — PROVIDER CONTACT NOTE (OTHER) - SITUATION
PCEA wire disconnected from pump catheter. PCEA pump stopped.  Patient voided 50ml of blood tinged urine post reyes removal.

## 2020-03-10 NOTE — PROGRESS NOTE ADULT - SUBJECTIVE AND OBJECTIVE BOX
Day _1__ of Anesthesia Pain Management Service    SUBJECTIVE:    Therapy:	  [ ] IV PCA	   [ 1] Epidural           [ ] s/p Spinal Opoid              [ ] Postpartum infusion	  [ 1] Patient controlled regional anesthesia (PCRA)    [ ] prn Analgesics    OBJECTIVE:   [1 ] No new signs     [ ] Other:    Side Effects:  [1 ] None			[ ] Other:    Assessment of Catheter Site:		[ ] Intact		[ ] Other:    ASSESSMENT/PLAN  [ 1] Continue current therapy    [ ] Therapy changed to:    [ ] IV PCA       [ ] Epidural     [ ] prn Analgesics     Comments:

## 2020-03-10 NOTE — PROGRESS NOTE ADULT - SUBJECTIVE AND OBJECTIVE BOX
GENERAL SURGERY PROGRESS NOTE    STATUS POST:    POST OPERATIVE DAY #:       SUBJECTIVE    OVERNIGHT EVENTS:    10-point review of systems completed and negative except as noted above.      OBJECTIVE    MEDICATIONS  dextrose 40% Gel 15 Gram(s) Oral once PRN  dextrose 50% Injectable 12.5 Gram(s) IV Push once  dextrose 50% Injectable 25 Gram(s) IV Push once  dextrose 50% Injectable 25 Gram(s) IV Push once  glucagon  Injectable 1 milliGRAM(s) IntraMuscular once PRN  heparin  Injectable 5000 Unit(s) SubCutaneous every 8 hours  hydromorphone (10 MICROgram(s)/mL) + bupivacaine 0.0625% in 0.9% Sodium Chloride PCEA 250 milliLiter(s) Epidural PCA Continuous  insulin lispro (HumaLOG) corrective regimen sliding scale   SubCutaneous three times a day before meals  insulin lispro (HumaLOG) corrective regimen sliding scale   SubCutaneous at bedtime  naloxone Injectable 0.1 milliGRAM(s) IV Push every 3 minutes PRN  ondansetron Injectable 4 milliGRAM(s) IV Push every 6 hours PRN  sodium chloride 0.9%. 1000 milliLiter(s) IV Continuous <Continuous>      PHYSICAL EXAM  T(C): 36.8 (03-10-20 @ 05:40), Max: 37.1 (03-09-20 @ 12:00)  HR: 75 (03-10-20 @ 05:40) (73 - 104)  BP: 134/78 (03-10-20 @ 05:40) (130/71 - 173/69)  RR: 20 (03-10-20 @ 05:40) (12 - 21)  SpO2: 99% (03-10-20 @ 05:40) (94% - 100%)    03-09-20 @ 07:01  -  03-10-20 @ 06:56  --------------------------------------------------------  IN: 2035 mL / OUT: 1060 mL / NET: 975 mL        General: Appears well, NAD  Neuro: AAOx3  CHEST: Clear to auscultation bilaterally  CV: Regular rate and rhythm  Abdomen: soft, nontender, nondistended, no rebound or guarding  Extremities: Grossly symmetric    LABS                        10.7   10.74 )-----------( 339      ( 10 Mar 2020 05:24 )             32.3     03-10    130<L>  |  98  |  19  ----------------------------<  100<H>  4.4   |  20<L>  |  1.50<H>    Ca    8.4      10 Mar 2020 05:24  Phos  3.6     03-10  Mg     1.8     03-10      PT/INR - ( 10 Mar 2020 05:24 )   PT: 13.6 SEC;   INR: 1.19          PTT - ( 10 Mar 2020 05:24 )  PTT:28.2 SEC      RADIOLOGY & ADDITIONAL STUDIES GENERAL SURGERY PROGRESS NOTE    STATUS POST:  TRIP hernandez  POST OPERATIVE DAY #: 1    Patient seen and examined. No overnight events. Patient feels well, tolerating diet, oob/ambi, pain controlled, denies f/c/n/v/d/sob,       SUBJECTIVE    OVERNIGHT EVENTS:    10-point review of systems completed and negative except as noted above.      OBJECTIVE    MEDICATIONS  dextrose 40% Gel 15 Gram(s) Oral once PRN  dextrose 50% Injectable 12.5 Gram(s) IV Push once  dextrose 50% Injectable 25 Gram(s) IV Push once  dextrose 50% Injectable 25 Gram(s) IV Push once  glucagon  Injectable 1 milliGRAM(s) IntraMuscular once PRN  heparin  Injectable 5000 Unit(s) SubCutaneous every 8 hours  hydromorphone (10 MICROgram(s)/mL) + bupivacaine 0.0625% in 0.9% Sodium Chloride PCEA 250 milliLiter(s) Epidural PCA Continuous  insulin lispro (HumaLOG) corrective regimen sliding scale   SubCutaneous three times a day before meals  insulin lispro (HumaLOG) corrective regimen sliding scale   SubCutaneous at bedtime  naloxone Injectable 0.1 milliGRAM(s) IV Push every 3 minutes PRN  ondansetron Injectable 4 milliGRAM(s) IV Push every 6 hours PRN  sodium chloride 0.9%. 1000 milliLiter(s) IV Continuous <Continuous>      PHYSICAL EXAM  T(C): 36.8 (03-10-20 @ 05:40), Max: 37.1 (03-09-20 @ 12:00)  HR: 75 (03-10-20 @ 05:40) (73 - 104)  BP: 134/78 (03-10-20 @ 05:40) (130/71 - 173/69)  RR: 20 (03-10-20 @ 05:40) (12 - 21)  SpO2: 99% (03-10-20 @ 05:40) (94% - 100%)    03-09-20 @ 07:01  -  03-10-20 @ 06:56  --------------------------------------------------------  IN: 2035 mL / OUT: 1060 mL / NET: 975 mL        General: Appears well, NAD  Neuro: AAOx3  CHEST: Clear to auscultation bilaterally  CV: Regular rate and rhythm  Abdomen: soft, nontender, nondistended, no rebound or guarding,  Extremities: Grossly symmetric  : reyes light pink, removed this am    LABS                        10.7   10.74 )-----------( 339      ( 10 Mar 2020 05:24 )             32.3     03-10    130<L>  |  98  |  19  ----------------------------<  100<H>  4.4   |  20<L>  |  1.50<H>    Ca    8.4      10 Mar 2020 05:24  Phos  3.6     03-10  Mg     1.8     03-10      PT/INR - ( 10 Mar 2020 05:24 )   PT: 13.6 SEC;   INR: 1.19          PTT - ( 10 Mar 2020 05:24 )  PTT:28.2 SEC      RADIOLOGY & ADDITIONAL STUDIES

## 2020-03-10 NOTE — PROVIDER CONTACT NOTE (OTHER) - REASON
PCEA wire disconnected from pump catheter. Patient voided 50ml of blood tinged urine post reyes removal.

## 2020-03-10 NOTE — PROVIDER CONTACT NOTE (OTHER) - ASSESSMENT
Patient A&Ox4. Shepard catheter leaking moderate amount of urine, underpad saturated. L Ucath in place, R Ucath not present.  PCEA insertion site with dry blood contained in tegaderm, no signs of active bleeding.

## 2020-03-10 NOTE — PROVIDER CONTACT NOTE (OTHER) - ACTION/TREATMENT ORDERED:
No action taken at this moment. MD came to unit to assess patient, will discuss pain management and PCEA issue with team. Will continue to monitor.

## 2020-03-10 NOTE — PROGRESS NOTE ADULT - SUBJECTIVE AND OBJECTIVE BOX
Day __1_ of Anesthesia Pain Management Service    SUBJECTIVE:  Pain Scale Score	At rest: ___ 	With Activity: ___ 	[x  ] Refer to charted pain scores    THERAPY:  [x ] Epidural Bupivacaine 0.0625% and Hydromorphone  		[x ] 10 micrograms/mL	[ ] 5 micrograms/mL  [ ] Epidural Bupivacaine 0.0625% and Fentanyl - 2 micrograms/mL  [ ] Epidural Ropivacaine 0.1% plain – 1 mg/mL  [ ] Patient Controlled Regional Anesthesia (PCRA) Ropivacaine  		[ ] 0.2%			[ ] 0.1%    Demand dose _3__ lockout __15_ (minutes) Continuous Rate _6__     MEDICATIONS  (STANDING):  dextrose 50% Injectable 12.5 Gram(s) IV Push once  dextrose 50% Injectable 25 Gram(s) IV Push once  dextrose 50% Injectable 25 Gram(s) IV Push once  heparin  Injectable 5000 Unit(s) SubCutaneous every 8 hours  HYDROmorphone PCA (1 mG/mL) 30 milliLiter(s) PCA Continuous PCA Continuous  insulin lispro (HumaLOG) corrective regimen sliding scale   SubCutaneous three times a day before meals  insulin lispro (HumaLOG) corrective regimen sliding scale   SubCutaneous at bedtime  sodium chloride 0.9%. 1000 milliLiter(s) (125 mL/Hr) IV Continuous <Continuous>    MEDICATIONS  (PRN):  dextrose 40% Gel 15 Gram(s) Oral once PRN Blood Glucose LESS THAN 70 milliGRAM(s)/deciliter  glucagon  Injectable 1 milliGRAM(s) IntraMuscular once PRN Glucose LESS THAN 70 milligrams/deciliter  HYDROmorphone PCA (1 mG/mL) Rescue Clinician Bolus 0.5 milliGRAM(s) IV Push every 15 minutes PRN for Pain Scale GREATER THAN 6  naloxone Injectable 0.1 milliGRAM(s) IV Push every 3 minutes PRN For ANY of the following changes in patient status:  A. RR LESS THAN 10 breaths per minute, B. Oxygen saturation LESS THAN 90%, C. Sedation score of 6  ondansetron Injectable 4 milliGRAM(s) IV Push every 6 hours PRN Nausea      OBJECTIVE:    Assessment of Catheter Site:	[ ] Left	[ ] Right  [x ] Epidural 	[ ] Femoral	      [ ] Saphenous   [ ] Supraclavicular   [ ] Other:    [x ] Dressing intact	[x ] Site non-tender	[ x] Site without erythema, discharge, edema  [x ] Epidural tubing and connection checked	[x [ Gross neurological exam within normal limits  [ ] Catheter removed – tip intact		[x ] Afebrile	[ ] Febrile: ___    PT/INR - ( 10 Mar 2020 05:24 )   PT: 13.6 SEC;   INR: 1.19          PTT - ( 10 Mar 2020 05:24 )  PTT:28.2 SEC                      10.7   10.74 )-----------( 339      ( 10 Mar 2020 05:24 )             32.3     Vital Signs Last 24 Hrs  T(C): 36.8 (03-10-20 @ 09:00), Max: 36.8 (03-10-20 @ 02:00)  T(F): 98.3 (03-10-20 @ 09:00), Max: 98.3 (03-10-20 @ 02:00)  HR: 75 (03-10-20 @ 09:00) (73 - 102)  BP: 143/75 (03-10-20 @ 09:00) (130/71 - 153/72)  BP(mean): 91 (03-09-20 @ 16:30) (88 - 94)  RR: 18 (03-10-20 @ 09:00) (13 - 21)  SpO2: 98% (03-10-20 @ 09:00) (94% - 100%)      Sedation Score:	[x ] Alert	[ ] Drowsy	[ ] Arousable	[ ] Asleep	[ ] Unresponsive    Side Effects:	[x ] None	[ ] Nausea	[ ] Vomiting	[ ] Pruritus  		[ ] Weakness		[ ] Numbness	[ ] Other:    ASSESSMENT/ PLAN:    Therapy to  be:	[x ] Continue   [ ] Discontinued   [ ] Change to prn Analgesics    Documentation and Verification of current medications:  [ X ] Done	[ ] Not done, not eligible, reason:    Comments:

## 2020-03-10 NOTE — PROGRESS NOTE ADULT - SUBJECTIVE AND OBJECTIVE BOX
Anesthesia Pain Management Service: Day _2 _ of Epidural    SUBJECTIVE: Patient doing well with PCEA and no problems.  Pain Scale Score:  6/10    THERAPY:  [x ] Epidural Bupivacaine 0.0625% and Hydromorphone  		[ X] 10 micrograms/mL	[ ] 5 micrograms/mL  [ ] Epidural Bupivacaine 0.0625% and Fentanyl - 2 micrograms/mL  [ ] Epidural Ropivacaine 0.1% plain – 1 mg/mL  [ ] Patient Controlled Regional Anesthesia (PCRA) Ropivacaine  		[ ] 0.2%			[ ] 0.1%    Demand dose __3_ lockout __15_ (minutes) Continuous Rate _6__ Total: __118.5__ ml used (in past 24 hours)      MEDICATIONS  (STANDING):  dextrose 50% Injectable 12.5 Gram(s) IV Push once  dextrose 50% Injectable 25 Gram(s) IV Push once  dextrose 50% Injectable 25 Gram(s) IV Push once  heparin  Injectable 5000 Unit(s) SubCutaneous every 8 hours  hydromorphone (10 MICROgram(s)/mL) + bupivacaine 0.0625% in 0.9% Sodium Chloride PCEA 250 milliLiter(s) Epidural PCA Continuous  insulin lispro (HumaLOG) corrective regimen sliding scale   SubCutaneous three times a day before meals  insulin lispro (HumaLOG) corrective regimen sliding scale   SubCutaneous at bedtime  magnesium sulfate  IVPB 2 Gram(s) IV Intermittent once  sodium chloride 0.9%. 1000 milliLiter(s) (125 mL/Hr) IV Continuous <Continuous>    MEDICATIONS  (PRN):  dextrose 40% Gel 15 Gram(s) Oral once PRN Blood Glucose LESS THAN 70 milliGRAM(s)/deciliter  glucagon  Injectable 1 milliGRAM(s) IntraMuscular once PRN Glucose LESS THAN 70 milligrams/deciliter  naloxone Injectable 0.1 milliGRAM(s) IV Push every 3 minutes PRN For ANY of the following changes in patient status:  A. RR LESS THAN 10 breaths per minute, B. Oxygen saturation LESS THAN 90%, C. Sedation score of 6  ondansetron Injectable 4 milliGRAM(s) IV Push every 6 hours PRN Nausea      OBJECTIVE: laying in bed     Assessment of Catheter Site:	[ ] Left	[ ] Right  [x ] Epidural 	[ ] Femoral	      [ ] Saphenous   [ ] Supraclavicular   [ ] Other:    [x ] Dressing intact	[x ] Site non-tender	[ x] Site without erythema, discharge, edema  [x ] Epidural tubing and connection checked	[x] Gross neurological exam within normal limits  [ ] Catheter removed – tip intact		[ ] Afebrile  	[ ] Febrile: ___   [ X] see Temp under VS below)    PT/INR - ( 10 Mar 2020 05:24 )   PT: 13.6 SEC;   INR: 1.19          PTT - ( 10 Mar 2020 05:24 )  PTT:28.2 SEC                      10.7   10.74 )-----------( 339      ( 10 Mar 2020 05:24 )             32.3     Vital Signs Last 24 Hrs  T(C): 36.8 (03-10-20 @ 09:00), Max: 37.1 (03-09-20 @ 12:00)  T(F): 98.3 (03-10-20 @ 09:00), Max: 98.7 (03-09-20 @ 12:00)  HR: 75 (03-10-20 @ 09:00) (73 - 104)  BP: 143/75 (03-10-20 @ 09:00) (130/71 - 173/69)  BP(mean): 91 (03-09-20 @ 16:30) (73 - 114)  RR: 18 (03-10-20 @ 09:00) (12 - 21)  SpO2: 98% (03-10-20 @ 09:00) (94% - 100%)      Sedation Score:	[x ] Alert	[ ] Drowsy	[ ] Arousable	[ ] Asleep	[ ] Unresponsive    Side Effects:	[x ] None	[ ] Nausea	[ ] Vomiting	[ ] Pruritus  		[ ] Weakness		[ ] Numbness	[ ] Other:    ASSESSMENT/ PLAN:    Therapy to  be:	[x ] Continue   [ ] Discontinued   [ ] Change to prn Analgesics    Documentation and Verification of current medications:  [ X ] Done	[ ] Not done, not eligible, reason:    Comments: Pain service informed by A team and covering RN that epidural was found this morning disconnected from joining connection from epidural to PCEA. Informed RN to cover exposed epidural cathter line with sterile gauze and tegaderm until pain service arrives. Upon arrival epidural connection disconnected and catheter open to air. Clean with alcohol swab and reconnected sterilely. Bolus given through PCEA and pump functioning properly. Discussed with Dr. Jones plan is too continue PCEA at this time for pain control will f/u this afternoon. Notified primary of plan.        Progress Note written now but Patient was seen earlier. Anesthesia Pain Management Service: Day _2 _ of Epidural    SUBJECTIVE: Patient doing well with PCEA and no problems.  Pain Scale Score:  6/10    THERAPY:  [x ] Epidural Bupivacaine 0.0625% and Hydromorphone  		[ X] 10 micrograms/mL	[ ] 5 micrograms/mL  [ ] Epidural Bupivacaine 0.0625% and Fentanyl - 2 micrograms/mL  [ ] Epidural Ropivacaine 0.1% plain – 1 mg/mL  [ ] Patient Controlled Regional Anesthesia (PCRA) Ropivacaine  		[ ] 0.2%			[ ] 0.1%    Demand dose __3_ lockout __15_ (minutes) Continuous Rate _6__ Total: __118.5__ ml used (in past 24 hours)      MEDICATIONS  (STANDING):  dextrose 50% Injectable 12.5 Gram(s) IV Push once  dextrose 50% Injectable 25 Gram(s) IV Push once  dextrose 50% Injectable 25 Gram(s) IV Push once  heparin  Injectable 5000 Unit(s) SubCutaneous every 8 hours  hydromorphone (10 MICROgram(s)/mL) + bupivacaine 0.0625% in 0.9% Sodium Chloride PCEA 250 milliLiter(s) Epidural PCA Continuous  insulin lispro (HumaLOG) corrective regimen sliding scale   SubCutaneous three times a day before meals  insulin lispro (HumaLOG) corrective regimen sliding scale   SubCutaneous at bedtime  magnesium sulfate  IVPB 2 Gram(s) IV Intermittent once  sodium chloride 0.9%. 1000 milliLiter(s) (125 mL/Hr) IV Continuous <Continuous>    MEDICATIONS  (PRN):  dextrose 40% Gel 15 Gram(s) Oral once PRN Blood Glucose LESS THAN 70 milliGRAM(s)/deciliter  glucagon  Injectable 1 milliGRAM(s) IntraMuscular once PRN Glucose LESS THAN 70 milligrams/deciliter  naloxone Injectable 0.1 milliGRAM(s) IV Push every 3 minutes PRN For ANY of the following changes in patient status:  A. RR LESS THAN 10 breaths per minute, B. Oxygen saturation LESS THAN 90%, C. Sedation score of 6  ondansetron Injectable 4 milliGRAM(s) IV Push every 6 hours PRN Nausea      OBJECTIVE: laying in bed     Assessment of Catheter Site:	[ ] Left	[ ] Right  [x ] Epidural 	[ ] Femoral	      [ ] Saphenous   [ ] Supraclavicular   [ ] Other:    [x ] Dressing intact	[x ] Site non-tender	[ x] Site without erythema, discharge, edema  [x ] Epidural tubing and connection checked	[x] Gross neurological exam within normal limits  [X ] Catheter removed – tip intact		[ ] Afebrile  	[ ] Febrile: ___   [ X] see Temp under VS below)    PT/INR - ( 10 Mar 2020 05:24 )   PT: 13.6 SEC;   INR: 1.19          PTT - ( 10 Mar 2020 05:24 )  PTT:28.2 SEC                      10.7   10.74 )-----------( 339      ( 10 Mar 2020 05:24 )             32.3     Vital Signs Last 24 Hrs  T(C): 36.8 (03-10-20 @ 09:00), Max: 37.1 (03-09-20 @ 12:00)  T(F): 98.3 (03-10-20 @ 09:00), Max: 98.7 (03-09-20 @ 12:00)  HR: 75 (03-10-20 @ 09:00) (73 - 104)  BP: 143/75 (03-10-20 @ 09:00) (130/71 - 173/69)  BP(mean): 91 (03-09-20 @ 16:30) (73 - 114)  RR: 18 (03-10-20 @ 09:00) (12 - 21)  SpO2: 98% (03-10-20 @ 09:00) (94% - 100%)      Sedation Score:	[x ] Alert	[ ] Drowsy	[ ] Arousable	[ ] Asleep	[ ] Unresponsive    Side Effects:	[x ] None	[ ] Nausea	[ ] Vomiting	[ ] Pruritus  		[ ] Weakness		[ ] Numbness	[ ] Other:    ASSESSMENT/ PLAN:    Therapy to  be:	[ ] Continue   [X ] Discontinued   [X ] Change to prn Analgesics    Documentation and Verification of current medications:  [ X ] Done	[ ] Not done, not eligible, reason:    Comments: Pain service informed by A team and covering RN that epidural was found this morning disconnected from joining connection from epidural to PCEA for an unknown amount of time. Informed RN to cover exposed epidural cathter line with sterile gauze and tegaderm until pain service arrives. Upon arrival pain service observed epidural connection disconnected and catheter open to air. Discussed with pain attending and plan is to d/c epidural. Primary team informed and plan for IVPCA for pain control at this point in time.

## 2020-03-10 NOTE — PROGRESS NOTE ADULT - SUBJECTIVE AND OBJECTIVE BOX
ANESTHESIA POSTOP CHECK    75y Male POSTOP DAY 1     Vital Signs Last 24 Hrs  T(C): 36.8 (10 Mar 2020 09:00), Max: 36.8 (10 Mar 2020 02:00)  T(F): 98.3 (10 Mar 2020 09:00), Max: 98.3 (10 Mar 2020 02:00)  HR: 75 (10 Mar 2020 09:00) (73 - 102)  BP: 143/75 (10 Mar 2020 09:00) (130/71 - 153/72)  BP(mean): 91 (09 Mar 2020 16:30) (88 - 94)  RR: 18 (10 Mar 2020 09:00) (13 - 21)  SpO2: 98% (10 Mar 2020 09:00) (94% - 100%)      [x ] NO APPARENT ANESTHESIA COMPLICATIONS      Comments:

## 2020-03-10 NOTE — PROVIDER CONTACT NOTE (OTHER) - ACTION/TREATMENT ORDERED:
MD aware, stated that reyes catheter with ucath leakage is normal. No concerns regarding PCEA insertion site. No action taken at this time. Will continue to monitor.

## 2020-03-10 NOTE — PROGRESS NOTE ADULT - ATTENDING COMMENTS
s/p aborted attempt at LAR  -clears to LRD  -OOB  -dvt ppx  -pain control  -DC reyes/epidural as tolerated

## 2020-03-11 NOTE — PROVIDER CONTACT NOTE (OTHER) - BACKGROUND
74 y/o diagnosis of malignant neoplasm of colon, unspecified scheduled for open sigmoid resection tumor debulking, Now S/P Ex lap, Cystoscopy, with ureteral catheterization.

## 2020-03-11 NOTE — PROVIDER CONTACT NOTE (OTHER) - ACTION/TREATMENT ORDERED:
MD aware, patient has been hypertensive all day, lets just monitor for now. Will continue to monitor patient.

## 2020-03-11 NOTE — PROVIDER CONTACT NOTE (OTHER) - ACTION/TREATMENT ORDERED:
PA aware, will order IV hydralazine. Will hold off on lisinopril until renal function improves. Will continue to monitor patient.

## 2020-03-11 NOTE — PROVIDER CONTACT NOTE (OTHER) - SITUATION
Patient is hypertensive /103 HR 75. Patient states he has not received any of his BP meds (lisinopril) since admission.

## 2020-03-11 NOTE — PROVIDER CONTACT NOTE (OTHER) - ASSESSMENT
Patient A&Ox4, VS stable with exception to elevated /91 HR 91. Denies chest pain, headache, dizziness, or SOB.

## 2020-03-11 NOTE — PROVIDER CONTACT NOTE (OTHER) - ASSESSMENT
Patient A&Ox4, VS stable with exception to elevated /103 HR 75. Denies chest pain, headache, dizziness, or SOB, Patient states he has not received any of his BP meds (lisinopril) since admission.

## 2020-03-11 NOTE — PROVIDER CONTACT NOTE (OTHER) - BACKGROUND
76 y/o diagnosis of malignant neoplasm of colon, unspecified scheduled for open sigmoid resection tumor debulking, Now S/P Ex lap, Cystoscopy, with ureteral catheterization.

## 2020-03-11 NOTE — PROVIDER CONTACT NOTE (OTHER) - ASSESSMENT
Patient A&Ox4, VS stable with exception to elevated /82 HR 88. Denies chest pain, headache, dizziness, or SOB.

## 2020-03-11 NOTE — PROGRESS NOTE ADULT - SUBJECTIVE AND OBJECTIVE BOX
Anesthesia Pain Management Service    SUBJECTIVE: Pt now off IV PCA without problems reported.    Therapy:	  [ X] IV PCA	   [ ] Epidural           [ ] s/p Spinal Opoid              [ ] Postpartum infusion	  [ ] Patient controlled regional anesthesia (PCRA)    [ ] prn Analgesics    Allergies    latex (Rash)  No Known Drug Allergies    Intolerances      MEDICATIONS  (STANDING):  acetaminophen   Tablet .. 650 milliGRAM(s) Oral every 6 hours  dextrose 5% + sodium chloride 0.45%. 1000 milliLiter(s) (75 mL/Hr) IV Continuous <Continuous>  dextrose 50% Injectable 12.5 Gram(s) IV Push once  dextrose 50% Injectable 25 Gram(s) IV Push once  dextrose 50% Injectable 25 Gram(s) IV Push once  enoxaparin Injectable 30 milliGRAM(s) SubCutaneous daily  insulin lispro (HumaLOG) corrective regimen sliding scale   SubCutaneous three times a day before meals  insulin lispro (HumaLOG) corrective regimen sliding scale   SubCutaneous at bedtime  lisinopril 10 milliGRAM(s) Oral daily  sodium phosphate IVPB 30 milliMole(s) IV Intermittent once    MEDICATIONS  (PRN):  dextrose 40% Gel 15 Gram(s) Oral once PRN Blood Glucose LESS THAN 70 milliGRAM(s)/deciliter  glucagon  Injectable 1 milliGRAM(s) IntraMuscular once PRN Glucose LESS THAN 70 milligrams/deciliter  oxyCODONE    IR 5 milliGRAM(s) Oral every 3 hours PRN Moderate Pain (4 - 6)  oxyCODONE    IR 10 milliGRAM(s) Oral every 3 hours PRN Severe Pain (7 - 10)      OBJECTIVE:   [X] No new signs     [ ] Other:    Side Effects:  [X ] None			[ ] Other:    Assessment of Catheter Site:		[ ] Intact		[ ] Other:    ASSESSMENT/PLAN  [ ] Continue current therapy    [X ] Therapy changed to:    [ ] IV PCA       [ ] Epidural     [ X] prn Analgesics     Comments: PRN Oral/IV opioids and/or non-opioid adjuvant analgesics to be used at this point.    Progress Note written now but Patient was seen earlier.

## 2020-03-11 NOTE — PROGRESS NOTE ADULT - SUBJECTIVE AND OBJECTIVE BOX
SURGERY DAILY PROGRESS NOTE:     SUBJECTIVE/24hr Events:     Patient seen and examined on am rounds. Yesterday with gas in stoma, started on CLD. Shepard d/c'd, passed TOV. PT consulted. No acute events overnight. This am pain well controlled. Denies nausea, vomiting, fever chills.      OBJECTIVE:    MEDICATIONS  (STANDING):  dextrose 5% + sodium chloride 0.45%. 1000 milliLiter(s) (125 mL/Hr) IV Continuous <Continuous>  dextrose 50% Injectable 12.5 Gram(s) IV Push once  dextrose 50% Injectable 25 Gram(s) IV Push once  dextrose 50% Injectable 25 Gram(s) IV Push once  enoxaparin Injectable 30 milliGRAM(s) SubCutaneous daily  HYDROmorphone PCA (1 mG/mL) 30 milliLiter(s) PCA Continuous PCA Continuous  insulin lispro (HumaLOG) corrective regimen sliding scale   SubCutaneous three times a day before meals  insulin lispro (HumaLOG) corrective regimen sliding scale   SubCutaneous at bedtime    MEDICATIONS  (PRN):  dextrose 40% Gel 15 Gram(s) Oral once PRN Blood Glucose LESS THAN 70 milliGRAM(s)/deciliter  glucagon  Injectable 1 milliGRAM(s) IntraMuscular once PRN Glucose LESS THAN 70 milligrams/deciliter  HYDROmorphone PCA (1 mG/mL) Rescue Clinician Bolus 0.5 milliGRAM(s) IV Push every 15 minutes PRN for Pain Scale GREATER THAN 6  naloxone Injectable 0.1 milliGRAM(s) IV Push every 3 minutes PRN For ANY of the following changes in patient status:  A. RR LESS THAN 10 breaths per minute, B. Oxygen saturation LESS THAN 90%, C. Sedation score of 6  ondansetron Injectable 4 milliGRAM(s) IV Push every 6 hours PRN Nausea      Vital Signs Last 24 Hrs  T(C): 36.8 (11 Mar 2020 05:26), Max: 37.1 (10 Mar 2020 21:44)  T(F): 98.2 (11 Mar 2020 05:26), Max: 98.8 (10 Mar 2020 21:44)  HR: 76 (11 Mar 2020 05:26) (50 - 76)  BP: 162/78 (11 Mar 2020 05:26) (119/69 - 166/75)  BP(mean): --  RR: 18 (11 Mar 2020 05:26) (18 - 18)  SpO2: 100% (11 Mar 2020 05:26) (97% - 100%)      I&O's Detail    10 Mar 2020 07:01  -  11 Mar 2020 07:00  --------------------------------------------------------  IN:    dextrose 5% + sodium chloride 0.45%.: 1250 mL    Oral Fluid: 1080 mL    sodium chloride 0.9%: 1300 mL  Total IN: 3630 mL    OUT:    Voided: 2375 mL  Total OUT: 2375 mL    Total NET: 1255 mL            LABS:                        10.7   10.74 )-----------( 339      ( 10 Mar 2020 05:24 )             32.3     03-10    130<L>  |  98  |  19  ----------------------------<  100<H>  4.4   |  20<L>  |  1.50<H>    Ca    8.4      10 Mar 2020 05:24  Phos  3.6     03-10  Mg     1.8     03-10      PT/INR - ( 10 Mar 2020 05:24 )   PT: 13.6 SEC;   INR: 1.19          PTT - ( 10 Mar 2020 05:24 )  PTT:28.2 SEC              PHYSICAL EXAM:  Constitutional: well developed, well nourished, NAD  ENMT: normal facies, symmetric  Respiratory: Normal respiratory effort   Abdomen: Softly distended, appropriate tenderness, no rebound or guarding. Incision c/d/i. Stoma with gas in bag.

## 2020-03-11 NOTE — PROGRESS NOTE ADULT - SUBJECTIVE AND OBJECTIVE BOX
Anesthesia Pain Management Service    SUBJECTIVE: Patient is doing well with IV PCA and no significant problems reported.    Pain Scale Score	At rest: __5_ 	With Activity: ___ 	[X ] Refer to charted pain scores    THERAPY:    [ ] IV PCA Morphine		[ ] 5 mg/mL	[ ] 1 mg/mL  [X ] IV PCA Hydromorphone	[ ] 5 mg/mL	[X ] 1 mg/mL  [ ] IV PCA Fentanyl		[ ] 50 micrograms/mL    Demand dose __0.2_ lockout __6_ (minutes) Continuous Rate _0__ Total: _0.5__   mg used (in past 24 hrs)      MEDICATIONS  (STANDING):  dextrose 5% + sodium chloride 0.45%. 1000 milliLiter(s) (75 mL/Hr) IV Continuous <Continuous>  dextrose 50% Injectable 12.5 Gram(s) IV Push once  dextrose 50% Injectable 25 Gram(s) IV Push once  dextrose 50% Injectable 25 Gram(s) IV Push once  enoxaparin Injectable 30 milliGRAM(s) SubCutaneous daily  HYDROmorphone PCA (1 mG/mL) 30 milliLiter(s) PCA Continuous PCA Continuous  insulin lispro (HumaLOG) corrective regimen sliding scale   SubCutaneous three times a day before meals  insulin lispro (HumaLOG) corrective regimen sliding scale   SubCutaneous at bedtime  lisinopril 10 milliGRAM(s) Oral daily  magnesium sulfate  IVPB 2 Gram(s) IV Intermittent once  potassium chloride    Tablet ER 20 milliEquivalent(s) Oral once  sodium phosphate IVPB 30 milliMole(s) IV Intermittent once    MEDICATIONS  (PRN):  dextrose 40% Gel 15 Gram(s) Oral once PRN Blood Glucose LESS THAN 70 milliGRAM(s)/deciliter  glucagon  Injectable 1 milliGRAM(s) IntraMuscular once PRN Glucose LESS THAN 70 milligrams/deciliter  HYDROmorphone PCA (1 mG/mL) Rescue Clinician Bolus 0.5 milliGRAM(s) IV Push every 15 minutes PRN for Pain Scale GREATER THAN 6  naloxone Injectable 0.1 milliGRAM(s) IV Push every 3 minutes PRN For ANY of the following changes in patient status:  A. RR LESS THAN 10 breaths per minute, B. Oxygen saturation LESS THAN 90%, C. Sedation score of 6  ondansetron Injectable 4 milliGRAM(s) IV Push every 6 hours PRN Nausea      OBJECTIVE: Patient laying in bed    Sedation Score:	[ X] Alert	[ ] Drowsy 	[ ] Arousable	[ ] Asleep	[ ] Unresponsive    Side Effects:	[X ] None	[ ] Nausea	[ ] Vomiting	[ ] Pruritus  		[ ] Other:    Vital Signs Last 24 Hrs  T(C): 36.7 (11 Mar 2020 10:05), Max: 37.1 (10 Mar 2020 21:44)  T(F): 98.1 (11 Mar 2020 10:05), Max: 98.8 (10 Mar 2020 21:44)  HR: 81 (11 Mar 2020 12:01) (50 - 81)  BP: 173/91 (11 Mar 2020 12:01) (119/69 - 177/103)  BP(mean): --  RR: 17 (11 Mar 2020 11:37) (17 - 18)  SpO2: 100% (11 Mar 2020 11:37) (97% - 100%)    ASSESSMENT/ PLAN    Therapy to  be:	[ ] Continue   [ X] Discontinued   [X ] Change to prn Analgesics    Documentation and Verification of current medications:   [X] Done	[ ] Not done, not elligible    Comments: PRN Oral/IV opioids and/or Adjuvant non-opioid medication to be ordered at this point.    Progress Note written now but Patient was seen earlier.

## 2020-03-12 NOTE — PROGRESS NOTE ADULT - ASSESSMENT
This is a 74 y/o M POD 2 s/p ex lap, TRIP. Pt has history of metastatic rectal adenocarcinoma and prostate cancer s/p colostomy placement after initially presenting with LBO. Pt is s/p chemotherapy, last session in 1/2020, s/p ex lap lysis of adhesions in 5/2019. Tumor initially deemed unresectable, but after favorable course of chemo, debulking was attempted.  Intraop tumor burden was deemed unresectable. Recovering appropriately.     Plan  -am labs  -advance to regular diet   -oob/ambi  -f/u PT consults   -dvt ppx GILL  - PO pain control   -dispo planning     A Team Surgery   [id number]
This is a 76 y/o M POD 2 s/p ex lap, TRIP. Pt has history of metastatic rectal adenocarcinoma and prostate cancer s/p colostomy placement after initially presenting with LBO. Pt is s/p chemotherapy, last session in 1/2020, s/p ex lap lysis of adhesions in 5/2019. Tumor initially deemed unresectable, but after favorable course of chemo, debulking was attempted.  Intraop tumor burden was deemed unresectable.      Plan  -am labs  -f/u creatinine   -c.w CLD   -oob/ambi, f/u PT consults   -dvt ppx GILL  -pain control PCA    A Team Surgery   [id number]
This is a 74 y/o M POD 1 s/p ex lap, TRIP. Pt has history of metastatic rectal adenocarcinoma and prostate cancer s/p colostomy placement after initially presenting with LBO. Pt is s/p chemotherapy, last session in 1/2020, s/p ex lap lysis of adhesions in 5/2019. Tumor initially deemed unresectable, but after favorable course of chemo, debulking was attempted.  Intraop tumor burden was deemed unresectable.      Plan  -am labs  -CLD adv to Reg  -oob/ambi  -dvt ppx GILL  -pain control PCEA, d/c today  -Shepard catheter removed, TOV.

## 2020-03-12 NOTE — DISCHARGE NOTE PROVIDER - CARE PROVIDER_API CALL
Herve Jones)  ColonRectal Surgery; Surgery  [facility], [address] [place] [address]  Phone: [phone]  Fax: [phone]  Follow Up Time: 1 week

## 2020-03-12 NOTE — PROVIDER CONTACT NOTE (OTHER) - ACTION/TREATMENT ORDERED:
MD aware, patient has been hypertensive all day, lets just monitor for now. Will continue to monitor patient. Okay to give lisinopril at 6AM even though pt got last dose 1pm 3/11

## 2020-03-12 NOTE — PROVIDER CONTACT NOTE (OTHER) - ASSESSMENT
Patient A&Ox4, VS stable with exception to elevated /93 HR 72. Denies chest pain, headache, dizziness, or SOB.

## 2020-03-12 NOTE — DISCHARGE NOTE PROVIDER - NSDCCPCAREPLAN_GEN_ALL_CORE_FT
PRINCIPAL DISCHARGE DIAGNOSIS  Diagnosis: Rectal cancer  Assessment and Plan of Treatment: WOUND CARE:  Please keep incisions clean and dry. Please do not Scrub or rub incisions. Do not use lotion or powder on incisions.   BATHING: You may shower and/or sponge bathe. You may use warm soapy water in the shower and rinse, pat dry.  ACTIVITY: No heavy lifting or straining. Otherwise, you may return to your usual level of physical activity. If you are taking narcotic pain medication DO NOT drive a car, operate machinery or make important decisions.  DIET: Return to your usual diet.  NOTIFY YOUR SURGEON IF YOU HAVE: any bleeding that does not stop, any pus draining from your wound(s), any fever (over 100.4 F) persistent nausea/vomiting, or if your pain is not controlled on your discharge pain medications, unable to urinate.  Please follow up with your primary care physician in one week regarding your hospitalization, bring copies of your discharge paperwork. Your blood pressure was slightly elavated during admission, please see your primary care doctor about it. Continue your lisinopril  Please follow up with your surgeon, Dr. Herve Jones PRINCIPAL DISCHARGE DIAGNOSIS  Diagnosis: Rectal cancer  Assessment and Plan of Treatment: WOUND CARE:  Please keep incisions clean and dry. Please do not Scrub or rub incisions. Do not use lotion or powder on incisions.   BATHING: You may shower and/or sponge bathe. You may use warm soapy water in the shower and rinse, pat dry.  ACTIVITY: No heavy lifting or straining. Otherwise, you may return to your usual level of physical activity. If you are taking narcotic pain medication DO NOT drive a car, operate machinery or make important decisions.  DIET: Return to your usual diet.  NOTIFY YOUR SURGEON IF YOU HAVE: any bleeding that does not stop, any pus draining from your wound(s), any fever (over 100.4 F) persistent nausea/vomiting, or if your pain is not controlled on your discharge pain medications, unable to urinate.  Please follow up with your primary care physician in one week regarding your hospitalization, bring copies of your discharge paperwork. Your blood pressure was slightly elavated during admission, please see your primary care doctor about it. Continue your lisinopril  Please follow up with your surgeon, Dr. Herve Jones      SECONDARY DISCHARGE DIAGNOSES  Diagnosis: Hyponatremia  Assessment and Plan of Treatment: Patient hyponatremic throughout hospital stay despite normal saline and salt tabs. Asymptomatic. Advised to stay overnight to monitor for improvement however  agreed to follow up with PCP within the next 1-3 days regarding this issue.    Diagnosis: Hypertension  Assessment and Plan of Treatment: Patient hypertensive post opertively. SBP in 160's-170's overnight despite resuming home meds. Please follow up with PCP for further evaluation.

## 2020-03-12 NOTE — PROGRESS NOTE ADULT - SUBJECTIVE AND OBJECTIVE BOX
SURGERY DAILY PROGRESS NOTE:     SUBJECTIVE/24hr Events:     Patient seen and examined on am rounds. Yesterday PCA d/c'd patient c/t tolerate CLD. Gas in ostomy. No acute events overnight. This am pt feels well. Pain well controlled.  Denies chest pain, shortness of breath, nausea, vomiting, fever chills. This am gas and stool noted in ostomy. Previously had uptrending Cr, likely 2/2 ROMELIA, now WNL.      OBJECTIVE:    MEDICATIONS  (STANDING):  acetaminophen   Tablet .. 650 milliGRAM(s) Oral every 6 hours  amLODIPine   Tablet 5 milliGRAM(s) Oral daily  aspirin enteric coated 81 milliGRAM(s) Oral daily  dextrose 50% Injectable 12.5 Gram(s) IV Push once  dextrose 50% Injectable 25 Gram(s) IV Push once  dextrose 50% Injectable 25 Gram(s) IV Push once  enoxaparin Injectable 30 milliGRAM(s) SubCutaneous daily  insulin lispro (HumaLOG) corrective regimen sliding scale   SubCutaneous three times a day before meals  insulin lispro (HumaLOG) corrective regimen sliding scale   SubCutaneous at bedtime  lisinopril 10 milliGRAM(s) Oral daily    MEDICATIONS  (PRN):  dextrose 40% Gel 15 Gram(s) Oral once PRN Blood Glucose LESS THAN 70 milliGRAM(s)/deciliter  glucagon  Injectable 1 milliGRAM(s) IntraMuscular once PRN Glucose LESS THAN 70 milligrams/deciliter  oxyCODONE    IR 5 milliGRAM(s) Oral every 3 hours PRN Moderate Pain (4 - 6)  oxyCODONE    IR 10 milliGRAM(s) Oral every 3 hours PRN Severe Pain (7 - 10)      Vital Signs Last 24 Hrs  T(C): 36.7 (12 Mar 2020 05:26), Max: 36.8 (11 Mar 2020 13:42)  T(F): 98.1 (12 Mar 2020 05:26), Max: 98.3 (11 Mar 2020 13:42)  HR: 88 (12 Mar 2020 06:25) (72 - 89)  BP: 156/84 (12 Mar 2020 06:25) (154/85 - 177/103)  BP(mean): 122 (11 Mar 2020 14:52) (122 - 122)  RR: 18 (12 Mar 2020 05:26) (17 - 20)  SpO2: 100% (12 Mar 2020 05:26) (98% - 100%)      I&O's Detail    11 Mar 2020 07:01  -  12 Mar 2020 07:00  --------------------------------------------------------  IN:    dextrose 5% + sodium chloride 0.45%.: 1050 mL    IV PiggyBack: 550 mL    Oral Fluid: 1320 mL    sodium chloride 0.9%: 975 mL  Total IN: 3895 mL    OUT:    Voided: 2340 mL  Total OUT: 2340 mL    Total NET: 1555 mL                LABS:                        10.1   8.10  )-----------( 347      ( 11 Mar 2020 09:58 )             32.1     03-11    129<L>  |  93<L>  |  10  ----------------------------<  207<H>  3.8   |  21<L>  |  1.18    Ca    8.2<L>      11 Mar 2020 09:58  Phos  1.7     03-11  Mg     1.8     03-11      PT/INR - ( 11 Mar 2020 07:00 )   PT: 12.7 SEC;   INR: 1.10          PTT - ( 11 Mar 2020 07:00 )  PTT:26.4 SEC              PHYSICAL EXAM:  Constitutional: well developed, well nourished, NAD  ENMT: normal facies, symmetric  Respiratory: Normal respiratory effort   Abdomen: soft, NTND. no rebound or guarding. Incision c/d/i. Ostomy with stool and gas in bag.

## 2020-03-12 NOTE — PHYSICAL THERAPY INITIAL EVALUATION ADULT - REFERRING PHYSICIAN, REHAB EVAL
Keep using Chantix; officially it is allowed for 3 months. I will use longer if it helps.  Use omeprazole as needed or I could order ranitidine 300 mg to have on hand.  90 days costs about $5 by prescription  We will do MRI lumbar area and I will check who in our system could consider neurotransmitter for pain control.   
Robert Edgar

## 2020-03-12 NOTE — DISCHARGE NOTE NURSING/CASE MANAGEMENT/SOCIAL WORK - NSDCPNINST_GEN_ALL_CORE
please call your provider or go to the ER if you experience any chest pain, shortness of breath, uncontrolled pain, fever > 100.4, nausea/vomiting, or pus/bleeding from surgical incision .

## 2020-03-12 NOTE — DISCHARGE NOTE PROVIDER - NSDCCPTREATMENT_GEN_ALL_CORE_FT
PRINCIPAL PROCEDURE  Procedure: Exploratory laparotomy  Findings and Treatment:       SECONDARY PROCEDURE  Procedure: Enterolysis  Findings and Treatment:

## 2020-03-12 NOTE — DISCHARGE NOTE PROVIDER - NSDCMRMEDTOKEN_GEN_ALL_CORE_FT
acetaminophen 325 mg oral tablet: 2 tab(s) orally every 6 hours  Aspir 81 oral delayed release tablet: 1  orally once a week last dose on 2/28/20  lisinopril 10 mg oral tablet: 1 tab(s) orally once a day  magnesium oxide 400 mg (240 mg elemental magnesium) oral tablet: 1  orally 2 times a day  metFORMIN 500 mg oral tablet, extended release: 1 tab(s) orally once a day

## 2020-03-12 NOTE — DISCHARGE NOTE PROVIDER - NSDCFUADDAPPT_GEN_ALL_CORE_FT
Please follow up with primary care physician, Dr. Saldaña, in the next 1-3 days regarding hyponatremia. If you develop change in mental status, dizziness, seizures call 911.

## 2020-03-12 NOTE — DISCHARGE NOTE PROVIDER - HOSPITAL COURSE
76 y/o M presented for scheduled surgery with Dr Herve Jones. Pt has history of metastatic rectal adenocarcinoma and prostate cancer s/p colostomy placement after initially presenting with LBO. Pt is s/p chemotherapy, last session in 1/2020, s/p ex lap lysis of adhesions in 5/2019. Tumor initially deemed unresectable, but after favorable course of chemo, debulking was attempted.  Intraop tumor burden was deemed unresectable.  Post op patients diet was slowly advanced as tolerated. Patient started on NaCl tabs for hyponatremia, as per A team not to be discharged on NaCl tabs. He was also started on amlodipine but also not to be discharged on it as per team.  At this time, pt is tolerating a regular diet, ambulating and voiding.  Pt has been deemed stable for discharge at this time. 76 y/o M presented for scheduled surgery with Dr Herve Jones. Pt has history of metastatic rectal adenocarcinoma and prostate cancer s/p colostomy placement after initially presenting with LBO. Pt is s/p chemotherapy, last session in 1/2020, s/p ex lap lysis of adhesions in 5/2019. Tumor initially deemed unresectable, but after favorable course of chemo, debulking was attempted.  Intraop tumor burden was deemed unresectable.  Post op patients diet was slowly advanced as tolerated. Patient started on NaCl tabs for hyponatremia, as per A team not to be discharged on NaCl tabs. He was also started on amlodipine but also not to be discharged on it as per team. Will follow up with PCP regarding sodium and hypertension. At this time, pt is tolerating a regular diet, ambulating and voiding.  Pt has been deemed stable for discharge at this time.

## 2020-03-19 NOTE — HISTORY OF PRESENT ILLNESS
[FreeTextEntry1] : Status post exploratory laparotomy for attempted reversal of colostomy. Patient progressing well. Decreased appetite decrease in pain. Stoma functioning.

## 2020-03-19 NOTE — ASSESSMENT
[FreeTextEntry1] : Stage IV colon cancer\par -Patient progressing well\par -Dry dressing to wound\par -Follow up in one week for remaining staple removal\par -Diet as tolerated

## 2020-03-31 NOTE — HISTORY OF PRESENT ILLNESS
[FreeTextEntry1] : 75-year-old male status post exploratory laparotomy. Patient progressing well. Patient does report weakness over the last 24 hours. Denies fevers or chills. Stoma functioning. Decreased appetite

## 2020-03-31 NOTE — ASSESSMENT
[FreeTextEntry1] : Rectosigmoid cancer\par -I recommended patient continue to monitor energy. If increasing pain, shortness of breath or fevers, patient will call office or present to emergency room. We discussed the dangers of emergency room given her an outbreak of coronal virus.\par -Patient is scheduled to start chemotherapy next week, but will discuss further with oncology

## 2020-04-01 PROBLEM — Z87.19 HISTORY OF HEMORRHOIDS: Status: RESOLVED | Noted: 2019-03-07 | Resolved: 2020-01-01

## 2020-04-01 PROBLEM — Z87.2 HISTORY OF CONTACT DERMATITIS: Status: RESOLVED | Noted: 2019-05-24 | Resolved: 2020-01-01

## 2020-04-01 PROBLEM — L27.0 DRUG-INDUCED SKIN RASH: Status: RESOLVED | Noted: 2019-01-01 | Resolved: 2020-01-01

## 2020-04-01 PROBLEM — J06.9 ACUTE URI: Status: RESOLVED | Noted: 2019-01-01 | Resolved: 2020-01-01

## 2020-04-01 PROBLEM — H92.11 PURULENT OTORRHEA OF RIGHT EAR: Status: RESOLVED | Noted: 2019-01-01 | Resolved: 2020-01-01

## 2020-04-01 PROBLEM — K56.609 COLON OBSTRUCTION: Status: RESOLVED | Noted: 2019-02-14 | Resolved: 2020-01-01

## 2020-04-01 PROBLEM — Z85.038 HISTORY OF MALIGNANT NEOPLASM OF COLON: Status: RESOLVED | Noted: 2019-05-14 | Resolved: 2020-01-01

## 2020-04-01 PROBLEM — L85.3 XEROSIS CUTIS: Status: RESOLVED | Noted: 2019-05-24 | Resolved: 2020-01-01

## 2020-04-01 PROBLEM — K57.90 DIVERTICULOSIS: Status: RESOLVED | Noted: 2019-03-07 | Resolved: 2020-01-01

## 2020-04-01 NOTE — HISTORY OF PRESENT ILLNESS
[Patient] : patient [Family Member] : family member [FreeTextEntry1] : 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, s/p diverting colostomy and mucus fistula in January and May 2019, initiated chemotherapy 7/2019 with tumour shrinkage, stopped chemo 1/2020 before going for exploratory laparotomy for tumour debulking and colostomy reversal 3/9/2020,  (not successful), HTN, HLD, prediabetes, h/o prostrate cancer s/p radical prostatectomy, partial left nephrectomy due to benign tumour seen for initial visit and enrollment.\par \par patient has been feeling very weak for the past two days with poor appetite, had a follow-up visit with surgeon Dr Jones yesterday, and plan is for monitoring the weakness, doing blood work and restart chemotherapy next Monday as outpatient. patient was walking independently and driving himself to chemo prior to this surgery 3/9/2020 as per daughter in law. however he is too weak to walk, and dress himself due to extreme weakness for the past few days, needing assistance with all ADLs.\par no other complaints.\par denies nausea, vomiting, fever, chills, SOB, urinary complaints.\par eats soft regular food, with no swallowing problems. \par reports markedly reduced  appetite for the past week, lost almost 10 lbs, weight has been declining almost 10-20 lbs loss in the past few months.\par BM is soft via colostomy on right side. daughter in law changes the bag daily or as needed, does not take any laxatives, still has increased amount of mucous discharge from rectum, which makes him very anxious and disturbs his sleep at night, he wears depends. no blood in mucus noted\par sleeps ok most night, has been disturbed past few days due to increased amount of mucosal discharge\par memory is intact\par behavior is calm with no issues\par mood is ok with no history of depression, \par no skin problems\par walks independently at baseline, needing assistance for the past two days due to extreme weakness, no recent falls\par \par sigmoid colon cancer with peritoneal disease s/p exp laparotomy- patient had an attempt for tumour debulking and reversal of colostomy early march which was not successful due to extensive disease. patient was doing ok n chemo before that, however feels extremely weak after the procedure. chemo is planned to be start next monday.\par follows Dr Jones and Dr Sandoval. PCP is Dr Saldaña.\par \par HTN- long standing- on lisinopril daily. no complications reported.\par \par HLD- used to be on atorvastatin, was taken off due to chemotherapy\par -----------\par lives with son Orlin, and family who takes care of him\par BUCKY Burris id very involved in care\par \par

## 2020-04-01 NOTE — ASSESSMENT
[FreeTextEntry1] : patient is being seen for a visit provided via telehealth via xoom. This visit was first attempted using [employer] telehealth real-time audio visual technology, but was unable to be completed.\par EDEN SOLIS was located at their home, [address] [place] [address]\[place] [address], at the time of the visit.\par [facility] clinician, BRITTA WILCOX, was located remotely at their home in [place] at the time of the visit. \par The patient, EDEN SOLIS, and [facility] clinician, BRITTA WILCOX, participated in the telehealth encounter.\par Other participants included: BUCKY Burris, BONI Andrews CM\par \par EDEN SOLIS (Feb 28 1945) or his/her representative consents to the use of telehealth. All questions related to telehealth answered\par

## 2020-04-01 NOTE — PHYSICAL EXAM
[No Acute Distress] : no acute distress [Well Developed] : well developed [Normal Voice/Communication] : normal voice communication [EOMI] : extra ocular movement intact [Normal Oropharynx] : the oropharynx was normal [No Respiratory Distress] : no respiratory distress [No Accessory Muscle Use] : no accessory muscle use [No Edema] : there was no peripheral edema [Non Tender] : non-tender [Soft] : abdomen soft [Not Distended] : not distended [Normal Gait] : normal gait [No Joint Swelling] : no joint swelling seen [No Rash] : no rash [No Motor Deficits] : the motor exam was normal [Oriented x3] : oriented to person, place, and time [Normal Affect] : the affect was normal [Normal Mood] : the mood was normal [Normal Insight/Judgement] : insight and judgment were intact [de-identified] : looks weak overlal but not in distress, skin color pale [de-identified] : healing laporatomy site, right sided colostomy

## 2020-04-01 NOTE — REASON FOR VISIT
[Initial Annual Medicare Wellness Visit] : an initial annual Medicare wellness visit [Family Member] : family member [Pre-Visit Preparation] : pre-visit preparation was done [Intercurrent Specialty/Sub-specialty Visits] : the patient has intercurrent specialty/sub-specialty visits [FreeTextEntry3] : RN CM

## 2020-04-01 NOTE — COUNSELING
[Weight counseling provided] : weight counseling provided [Mediterranean diet recommended] : Mediterranean diet recommended [Medical/Nutritional supplementation as prescribed] : medical/nutritional supplementation as prescribed [Continue diet as tolerated] : continue diet as tolerated based on goals of care [Non - Smoker] : non-smoker [Smoke/CO Detectors] : smoke/CO detectors [Use grab bars] : use grab bars [Medical alert] : medical alert [Use assistive device to avoid falls] : use assistive device to avoid falls [Remove clutter and unsafe carpeting to avoid falls] : remove clutter and unsafe carpeting to avoid falls [Date: ___] : diabetic screening completed on [unfilled] [] : foot exam [Improve exercise tolerance] : improve exercise tolerance [Improve mobility] : improve mobility [Improve weight] : improve weight [Improve pain control] : improve pain control [Decrease stress] : decrease stress [Decrease hospital use] : decrease hospital use [Maintain functional ability] : maintain functional ability [Discussed disease trajectory with patient/caregiver] : discussed disease trajectory with patient/caregiver [Likely to achieve goals/desired outcomes] : likely to achieve goals/desired outcomes [Patient/Caregiver has ___ understanding of disease process] : patient/caregiver has [unfilled] understanding of disease process [Advanced Directives discussed: ____] : Advanced directives discussed: [unfilled] [de-identified] : not sure about GOC, MOLST not completed

## 2020-04-01 NOTE — CHRONIC CARE ASSESSMENT
[Can not Exercise (Disability)] : Exercise: The patient can not exercise due to disability [Walking] : walking [Low Salt Diet] : low salt [General Adherence] : and is generally adherent

## 2020-04-02 NOTE — CONSULT NOTE ADULT - ASSESSMENT
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  Patient presents today with weakness and electrolyte imbalance suggestive of dehydration with necrotic unresectable rectosigmoid mass.    - No acute surgical intervention   - Suggest medical admission for hydration, consider checking UA to eval for underlying infection  - Likely imaging findings due to necrotic tumor, less likely proctocolitis   - Regular diet as tolerated   - Heme/Onc with plans to initiate chemo on Monday- would touch base with oncologist Dr. Woods     D/W attending Dr. 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  Patient presents today with weakness and electrolyte imbalance suggestive of dehydration with necrotic unresectable rectosigmoid mass.    - No acute surgical intervention   - Suggest medical admission for hydration, consider checking UA to eval for underlying infection  - Likely imaging findings due to necrotic tumor, less likely proctocolitis   - Regular diet as tolerated   - Heme/Onc with plans to initiate chemo on Monday- would touch base with oncologist Dr. Woods     D/W attending Dr. Jones

## 2020-04-02 NOTE — CONSULT NOTE ADULT - SUBJECTIVE AND OBJECTIVE BOX
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 A subsequent attempt was made to explore the patient and excise the lesion however the rectosigmoid mass was found to be invading the bladder and was unresectable.  He was discharged from the hospital in mid march and had plans to resume chemotherapy on 4/6/20. Today he presents with worsening rectal discharge that he describes as mucus.  Also complains of weakness and anorexia.  Denies fevers, complains of a chronic cough but no dyspnea.  Denies any dysuria, chest pain, ostomy has been functioning normally.     In the ER he was mildly tachycardic which resolved with crystalloid resuscitation, normotensive and afebrile.  On exam his abdomen was soft, NTND with a pink ileostomy productive of brown stool and an intact mucus fistula.  On rectal exam he had a palpable necrotic and boggy mass.  Labs were significant for profound dehydration and electrolyte abnormalities (Hyponatremia, hypocholoridemia) with preserved renal function.  CTAP revealed profound rectal and distal sigmoid wall edema, masslike at the sigmoid with ill-defined pericolic soft tissue, markedly increased from 12/13/2019, suspicious for progression of disease with concern for superimposed proctocolitis. The pericolic soft tissue is inseparable from the bladder dome and bladder wall involvement cannot be excluded. In the low rectum, there is a 2.8 cm ill-defined fluid collection, likely corresponding to the report of purulent mucinous drainage. Also, new extensive conglomerate and centrally necrotic retroperitoneal lymphadenopathy, reflecting progression of disease. Moderate right hydroureteronephrosis to the level of the necrotic lymphadenopathy.      PMH/PSH  Diverticulosis  Diabetes mellitus  Prostate Cancer  Renal Cancer  Hyponatremia  Clinical Depression  Hyperlipidemia  Benign Essential Hypertension    History of chemotherapy  H/O cystoscopy  H/O exploratory laparotomy  Bowel obstruction  H/O prostatectomy  H/O partial nephrectomy  No significant past surgical history      MEDICATIONS    acetaminophen 325 mg oral tablet: 2 tab(s) orally every 6 hours (12 Mar 2020 08:06)  Aspir 81 oral delayed release tablet: 1  orally once a week last dose on 2/28/20 (09 Mar 2020 07:07)  lisinopril 10 mg oral tablet: 1 tab(s) orally once a day (09 Mar 2020 07:07)  magnesium oxide 400 mg (240 mg elemental magnesium) oral tablet: 1  orally 2 times a day (09 Mar 2020 07:07)  metFORMIN 500 mg oral tablet, extended release: 1 tab(s) orally once a day (09 Mar 2020 07:07)      Allergies    latex (Rash)  No Known Drug Allergies    Intolerances        Social: lives at home with daughter    Physical Exam  T(C): 37.1 (04-02-20 @ 21:24), Max: 37.1 (04-02-20 @ 21:24)  HR: 104 (04-02-20 @ 21:24) (103 - 112)  BP: 140/78 (04-02-20 @ 21:24) (136/82 - 140/78)  RR: 17 (04-02-20 @ 21:24) (16 - 18)  SpO2: 99% (04-02-20 @ 21:24) (98% - 100%)  Wt(kg): --  Tmax: T(C): , Max: 37.1 (04-02-20 @ 21:24)  Wt(kg): --      Gen: NAD  Neuro: AAOx3  HEENT: normocephalic, atraumatic, no scleral icterus  CV: S1, S2, RRR  Pulm: CTA B/L  Abd: abdomen was soft, NTND with a pink ileostomy productive of brown stool and an intact mucus fistula.    Rectal exam he had a palpable necrotic and boggy mass. Scant mucus no bloody discharge  Ext: warm, no edema  < from: CT Abdomen and Pelvis w/ IV Cont (04.02.20 @ 18:52) >    EXAM:  CT ABDOMEN AND PELVIS IC        PROCEDURE DATE:  Apr 2 2020         INTERPRETATION:  CLINICAL INFORMATION: Metastatic rectal adenocarcinoma and prostate cancer presenting with rectal pain and purulent mucous drainage   .  COMPARISON: CT chest, abdomen pelvis 12/13/2019.    PROCEDURE:   CT of the Abdomen and Pelvis was performed with intravenous contrast.   Intravenous contrast: 90 ml Omnipaque 350. 10 ml discarded.  Oral contrast: None.  Sagittal and coronal reformats were performed.    FINDINGS:    LOWER CHEST: A 0.3 cm right middle lobe subpleural nodule (series 2, image 5), new. Trace bilateral pleural effusions, asymmetric to the left, with bibasilar compressive atelectasis. A partially imaged Mediport catheter terminating in the SVC/RA. Coronary artery atherosclerotic calcifications.    LIVER: Hepatic cysts and additional subcentimeter hypodensities that are too small to characterize, stable. No suspicious enhancing lesions.  BILE DUCTS: Normal caliber.  GALLBLADDER: Withinnormal limits.  SPLEEN: Within normal limits.  PANCREAS: Fatty atrophy.  ADRENALS: Within normal limits.  KIDNEYS/URETERS: Left renal postsurgical change. Right renal cysts and additional subcentimeter hypodensities bilaterally that are too small to characterize. Delayed right nephrogram with moderate right hydroureteronephrosis to the level of retroperitoneal lymphadenopathy.    BLADDER: Marked masslike wall thickening of the distal sigmoid, described below, with pericolic soft tissue inseparable from the bladder dome, raising concern for bladder involvement.  REPRODUCTIVE ORGANS: Prostatectomy.    BOWEL: Profound rectal and distal sigmoid wall edema with asymmetric mural enhancement and masslike thickening at the sigmoid with ill-defined pericolic soft tissue/metastatic implants, markedly increased from 12/13/2019. Within the low rectum, there is an ill-defined, at least partially intraluminal, fluid collection measuring 2.8 x 2.8 x 2.3 cm (series 2, image 108 and series 601, image 56).Left hemiabdomen mucous fistula and a right hemiabdomen colostomy. Normal appendix. Scattered colonic diverticula without acute diverticulitis. No bowel obstruction.  PERITONEUM: Trace pelvic ascites. Perisigmoid soft tissue, as above, likely metastatic implants.  VESSELS: Atherosclerotic change. Patent portal and hepatic veins. Bilateral common iliac veins and external iliac veins are markedly compressed by the extensive retroperitoneal lymphadenopathy.  RETROPERITONEUM/LYMPH NODES: Extensive conglomerate necrotic retroperitoneal and bilateral pelvic sidewall lymphadenopathy. Lymphadenopathy is inseparable from the bilateral psoas musculature. Lymphadenopathy surrounds the aorta and the distal IVC. A discrete enlarged left external iliac/common femoral node measures 3.9 x 1.9 cm (series 2, image 96).    ABDOMINAL WALL: Postsurgical changes.  BONES: Degenerative changes. No suspicious osseous lesions.    IMPRESSION:     Profound rectal and distal sigmoid wall edema, masslike at the sigmoid with ill-defined pericolic soft tissue, markedly increased from 12/13/2019, suspicious for progression of disease with concern for superimposed proctocolitis. The pericolic soft tissue is inseparable from the bladder dome and bladder wall involvement cannot be excluded. In the low rectum, there is a 2.8 cm ill-defined fluid collection, likely corresponding to the report of purulent mucinous drainage.   Also, new extensive conglomerate and centrally necrotic retroperitoneal lymphadenopathy, reflecting progression of disease. Moderate right hydroureteronephrosis to the level of the necrotic lymphadenopathy.    Trace bilateral pleural effusions with bibasilar subsegmental atelectasis. A new 3 mm right middle lobe subpleural nodule.    < end of copied text >    LABS                        11.4   16.84 )-----------( 448      ( 02 Apr 2020 16:30 )             32.8     04-02    118<LL>  |  81<L>  |  23  ----------------------------<  113<H>  4.1   |  25  |  1.21    Ca    9.7      02 Apr 2020 16:30    TPro  7.9  /  Alb  3.6  /  TBili  0.7  /  DBili  x   /  AST  28  /  ALT  40  /  AlkPhos  192<H>  04-02    PT/INR - ( 02 Apr 2020 16:30 )   PT: 13.1 SEC;   INR: 1.13          PTT - ( 02 Apr 2020 16:30 )  PTT:28.3 SEC          IMAGING GENERAL SURGERY CONSULT NOTE  Attending: Robert  Service: B team surgery  Contact: p [phone]    HPI  76yo M with hx prostate ca s/p resection (2000), left kidney ca s/p L partial nephrectomy (2000), HLD, HTN, with 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 A subsequent attempt was made to explore the patient and excise the lesion however the rectosigmoid mass was found to be invading the bladder and was unresectable.  He was discharged from the hospital in mid march and had plans to resume chemotherapy on 4/6/20. Today he presents with worsening rectal discharge that he describes as mucus.  Also complains of weakness and anorexia.  Denies fevers, complains of a chronic cough but no dyspnea.  Denies any dysuria, chest pain, ostomy has been functioning normally.     In the ER he was mildly tachycardic which resolved with crystalloid resuscitation, normotensive and afebrile.  On exam his abdomen was soft, NTND with a pink colostomy productive of brown pasty stool and an intact mucus fistula.  On rectal exam he had a palpable necrotic and boggy mass.  Labs were significant for profound dehydration and electrolyte abnormalities (Hyponatremia, hypocholoridemia) with preserved renal function.  CTAP revealed profound rectal and distal sigmoid wall edema, masslike at the sigmoid with ill-defined pericolic soft tissue, markedly increased from 12/13/2019, suspicious for progression of disease with concern for superimposed proctocolitis. The pericolic soft tissue is inseparable from the bladder dome and bladder wall involvement cannot be excluded. In the low rectum, there is a 2.8 cm ill-defined fluid collection, likely corresponding to the report of purulent mucinous drainage. Also, new extensive conglomerate and centrally necrotic retroperitoneal lymphadenopathy, reflecting progression of disease. Moderate right hydroureteronephrosis to the level of the necrotic lymphadenopathy.      PMH/PSH  Diverticulosis  Diabetes mellitus  Prostate Cancer  Renal Cancer  Hyponatremia  Clinical Depression  Hyperlipidemia  Benign Essential Hypertension    History of chemotherapy  H/O cystoscopy  H/O exploratory laparotomy  Bowel obstruction  H/O prostatectomy  H/O partial nephrectomy  No significant past surgical history      MEDICATIONS    acetaminophen 325 mg oral tablet: 2 tab(s) orally every 6 hours (12 Mar 2020 08:06)  Aspir 81 oral delayed release tablet: 1  orally once a week last dose on 2/28/20 (09 Mar 2020 07:07)  lisinopril 10 mg oral tablet: 1 tab(s) orally once a day (09 Mar 2020 07:07)  magnesium oxide 400 mg (240 mg elemental magnesium) oral tablet: 1  orally 2 times a day (09 Mar 2020 07:07)  metFORMIN 500 mg oral tablet, extended release: 1 tab(s) orally once a day (09 Mar 2020 07:07)      Allergies    latex (Rash)  No Known Drug Allergies    Intolerances        Social: lives at home with daughter    Physical Exam  T(C): 37.1 (04-02-20 @ 21:24), Max: 37.1 (04-02-20 @ 21:24)  HR: 104 (04-02-20 @ 21:24) (103 - 112)  BP: 140/78 (04-02-20 @ 21:24) (136/82 - 140/78)  RR: 17 (04-02-20 @ 21:24) (16 - 18)  SpO2: 99% (04-02-20 @ 21:24) (98% - 100%)  Wt(kg): --  Tmax: T(C): , Max: 37.1 (04-02-20 @ 21:24)  Wt(kg): --      Gen: NAD  Neuro: AAOx3  HEENT: normocephalic, atraumatic, no scleral icterus  CV: S1, S2, RRR  Pulm: CTA B/L  Abd: abdomen was soft, NTND with a pink colostomy productive of pasty brown stool and an intact mucus fistula.    Rectal exam he had a palpable necrotic and boggy mass. Scant mucus no bloody discharge  Ext: warm, no edema  < from: CT Abdomen and Pelvis w/ IV Cont (04.02.20 @ 18:52) >    EXAM:  CT ABDOMEN AND PELVIS IC        PROCEDURE DATE:  Apr 2 2020         INTERPRETATION:  CLINICAL INFORMATION: Metastatic rectal adenocarcinoma and prostate cancer presenting with rectal pain and purulent mucous drainage   .  COMPARISON: CT chest, abdomen pelvis 12/13/2019.    PROCEDURE:   CT of the Abdomen and Pelvis was performed with intravenous contrast.   Intravenous contrast: 90 ml Omnipaque 350. 10 ml discarded.  Oral contrast: None.  Sagittal and coronal reformats were performed.    FINDINGS:    LOWER CHEST: A 0.3 cm right middle lobe subpleural nodule (series 2, image 5), new. Trace bilateral pleural effusions, asymmetric to the left, with bibasilar compressive atelectasis. A partially imaged Mediport catheter terminating in the SVC/RA. Coronary artery atherosclerotic calcifications.    LIVER: Hepatic cysts and additional subcentimeter hypodensities that are too small to characterize, stable. No suspicious enhancing lesions.  BILE DUCTS: Normal caliber.  GALLBLADDER: Withinnormal limits.  SPLEEN: Within normal limits.  PANCREAS: Fatty atrophy.  ADRENALS: Within normal limits.  KIDNEYS/URETERS: Left renal postsurgical change. Right renal cysts and additional subcentimeter hypodensities bilaterally that are too small to characterize. Delayed right nephrogram with moderate right hydroureteronephrosis to the level of retroperitoneal lymphadenopathy.    BLADDER: Marked masslike wall thickening of the distal sigmoid, described below, with pericolic soft tissue inseparable from the bladder dome, raising concern for bladder involvement.  REPRODUCTIVE ORGANS: Prostatectomy.    BOWEL: Profound rectal and distal sigmoid wall edema with asymmetric mural enhancement and masslike thickening at the sigmoid with ill-defined pericolic soft tissue/metastatic implants, markedly increased from 12/13/2019. Within the low rectum, there is an ill-defined, at least partially intraluminal, fluid collection measuring 2.8 x 2.8 x 2.3 cm (series 2, image 108 and series 601, image 56).Left hemiabdomen mucous fistula and a right hemiabdomen colostomy. Normal appendix. Scattered colonic diverticula without acute diverticulitis. No bowel obstruction.  PERITONEUM: Trace pelvic ascites. Perisigmoid soft tissue, as above, likely metastatic implants.  VESSELS: Atherosclerotic change. Patent portal and hepatic veins. Bilateral common iliac veins and external iliac veins are markedly compressed by the extensive retroperitoneal lymphadenopathy.  RETROPERITONEUM/LYMPH NODES: Extensive conglomerate necrotic retroperitoneal and bilateral pelvic sidewall lymphadenopathy. Lymphadenopathy is inseparable from the bilateral psoas musculature. Lymphadenopathy surrounds the aorta and the distal IVC. A discrete enlarged left external iliac/common femoral node measures 3.9 x 1.9 cm (series 2, image 96).    ABDOMINAL WALL: Postsurgical changes.  BONES: Degenerative changes. No suspicious osseous lesions.    IMPRESSION:     Profound rectal and distal sigmoid wall edema, masslike at the sigmoid with ill-defined pericolic soft tissue, markedly increased from 12/13/2019, suspicious for progression of disease with concern for superimposed proctocolitis. The pericolic soft tissue is inseparable from the bladder dome and bladder wall involvement cannot be excluded. In the low rectum, there is a 2.8 cm ill-defined fluid collection, likely corresponding to the report of purulent mucinous drainage.   Also, new extensive conglomerate and centrally necrotic retroperitoneal lymphadenopathy, reflecting progression of disease. Moderate right hydroureteronephrosis to the level of the necrotic lymphadenopathy.    Trace bilateral pleural effusions with bibasilar subsegmental atelectasis. A new 3 mm right middle lobe subpleural nodule.    < end of copied text >    LABS                        11.4   16.84 )-----------( 448      ( 02 Apr 2020 16:30 )             32.8     04-02    118<LL>  |  81<L>  |  23  ----------------------------<  113<H>  4.1   |  25  |  1.21    Ca    9.7      02 Apr 2020 16:30    TPro  7.9  /  Alb  3.6  /  TBili  0.7  /  DBili  x   /  AST  28  /  ALT  40  /  AlkPhos  192<H>  04-02    PT/INR - ( 02 Apr 2020 16:30 )   PT: 13.1 SEC;   INR: 1.13          PTT - ( 02 Apr 2020 16:30 )  PTT:28.3 SEC          IMAGING

## 2020-04-02 NOTE — ED PROVIDER NOTE - OBJECTIVE STATEMENT
75yM h/o metastatic rectal adenocarcinoma and prostate cancer s/p colostomy placement after initially presenting with LBO s/p ex lap lysis of adhesions ( ) pe. r  Dr Herve Jones 75yM h/o metastatic rectal adenocarcinoma and prostate cancer s/p colostomy placement after initially presenting with LBO, s/p chemo (last session 1/20/20) s/p ex lap with lysis of adhesions (3/9/20) by Dr Herve Jones presents with generalized weakness, decrease po and worsening rectal pain and mucus discharge after being discharged on 3/12/20. States that he is not able to ambulate within the past few days due to weakness. No fever, sob, chest pain, abd pain, n/v. 75yM h/o metastatic rectal adenocarcinoma and prostate cancer s/p colostomy placement after initially presenting with LBO, s/p chemo (last session 1/20/20) s/p ex lap with lysis of adhesions (3/9/20) by Dr Herve Jones presents with generalized weakness, decrease po and worsening rectal pain and mucus discharge after being discharged on 3/12/20. States that he is not able to ambulate within the past few days due to weakness. No fever, sob, chest pain, abd pain, n/v.    Attending/Margarito: 76 yo M as described above, rectal adenocarcinoma/prostate CA  s/p colostomy, chemo s/p ex lap for adhesions now p/w with a week of clear rectal mucus d/, anroexia and gernlaized weakness. Denies fever/chills, CP, SOB, abd pain, n/v.

## 2020-04-02 NOTE — ED PROVIDER NOTE - CLINICAL SUMMARY MEDICAL DECISION MAKING FREE TEXT BOX
75yM h/o metastatic rectal adenocarcinoma and prostate cancer s/p ex lap with lysis of adhesions (3/9/20) presents with generalized weakness, decrease po and worsening rectal pain and mucus discharge after being discharged on 3/12/20. Will get pre op labs, CT abd/pelvis, surgery consult and reassess

## 2020-04-02 NOTE — ED ADULT TRIAGE NOTE - CHIEF COMPLAINT QUOTE
Pt with colon cancer states having mucus come out of rectum since last night . Pt also with functioning colostomy bag. Pt co rectal and abdominal pain . Pts last chemo one month ago. Pt also states has low sodium 122

## 2020-04-02 NOTE — ED PROVIDER NOTE - PHYSICAL EXAMINATION
Gen: AAOx3, cachetic  Head: NCAT  HEENT: EOMI, oral mucosa moist, normal conjunctiva  Lung: CTAB, no respiratory distress, no wheezes/rhonchi/rales B/L, speaking in full sentences  CV: RRR, no murmurs, rubs or gallops  Abd: soft, non-tender, colostomy bag intact, surgical site intact without erythema, drainage,       no guarding, no CVA tenderness  MSK: no visible deformities  Rectal: Chaperoned by     Neuro: No focal sensory or motor deficits  Skin: Warm, well perfused, no rash  Psych: normal affect.   ~Phoenix Briones M.D. Resident Gen: AAOx3, cachetic  Head: NCAT  HEENT: EOMI, oral mucosa moist, normal conjunctiva  Lung: CTAB, no respiratory distress, no wheezes/rhonchi/rales B/L, speaking in full sentences  CV: RRR, no murmurs, rubs or gallops  Abd: soft, non-tender, colostomy bag intact, surgical site intact without erythema, drainage,       no guarding, no CVA tenderness  MSK: no visible deformities  Rectal: Chaperoned by     Neuro: No focal sensory or motor deficits  Skin: Warm, well perfused, no rash  Psych: normal affect.   ~Phoenix Briones M.D. Resident    Attending/Margarito: NAD; PERRL/EOMI, non-icterus, supple, no LAY, no JVD, RRR, CTAB; Abd-soft, NT/ND, no HSM, Colostomy-+drainage, rectal Exam-min clear d/c; no LE edema, A&Ox3, nonfocal; Skin-warm/dry

## 2020-04-02 NOTE — ED ADULT NURSE REASSESSMENT NOTE - NS ED NURSE REASSESS COMMENT FT1
Pt received from day RN. Pt laying comfortably in stretcher. Vitals as noted. Medications given as per MD order. Pt in no apparent distress. Respirations even and unlabored. Comfort measures provided. Awaiting further orders. Will continue to monitor.

## 2020-04-02 NOTE — ED PROVIDER NOTE - NS ED ROS FT
GENERAL: weakness, decrease PO, No fever or chills, EYES: no change in vision, HEENT: no trouble swallowing or speaking, CARDIAC: no chest pain, PULMONARY: no cough or SOB, GI: no abdominal pain, no nausea, no vomiting, no diarrhea or constipation, : rectal pain/discharge, No changes in urination, SKIN: no rashes, NEURO: no headache,  MSK: No joint pain ~Phoenix Briones M.D. Resident

## 2020-04-02 NOTE — ED PROVIDER NOTE - PMH
Benign Essential Hypertension    Diabetes mellitus  type 2  Diverticulosis    Hyperlipidemia    Hyponatremia    Prostate Cancer  denies chemo and radiation  Renal Cancer  denies chemo and radiation

## 2020-04-02 NOTE — ED PROVIDER NOTE - PROGRESS NOTE DETAILS
Anselmo IRIZARRY: Surgery has been consulted to come evaluate patient Elizabeth Goldberger PGY-3: pt signed out to me pending CT read. W/ worsening adenopathy and infection c/w proctocolitis, no obvious abscess. Ordered abx, surgery aware and coming to see pt Elizabeth Goldberger PGY-3: seen by surgery, who feel pt needs IV abx and hydration (poor po intake and significant hyponatremia). State no further planned surgical intervention as pt w/ too high tumor burden and on recent ex-lap determined cancer to be too advanced for resection. Will admit to med Elizabeth Goldberger PGY-3: spoke w/ hospitalist for admission, wanted to discuss whether surg might admit pt. Will call back Elizabeth Goldberger PGY-3: accepted to medicine

## 2020-04-02 NOTE — ED ADULT NURSE NOTE - OBJECTIVE STATEMENT
Patient presents for "mucus draining from rectum x few days and increased weakness and loss of appetite." Patient was seen last week for colostomy reversal but unable to have it performed due to tumor proximity to spinal cord, as per patient. Patient states last chemo was 1 month prior. Denies fever/chills/cough /SOB/ known sick contracts, but lives with several of his children. Patient has colostomy bag in place draining with incision site to left with dressing that is dry. No bleeding/redness/swelling to abdomen.

## 2020-04-03 NOTE — H&P ADULT - ATTENDING COMMENTS
Patient seen and examined.     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 Now p/w worsening rectal discharge, decreased PO, generalized weakness, and found to have severe hyponatremia, as well as CTAP showing progression of disease, ill-defined fluid collection in the rectum, and Moderate right hydroureteronephrosis.    Severe hyponatremia - Currently asymptomatic. Pt w/ hyponatremia noted on last admission and was on salt tabs but was not dcd on it. Urine studies at that time more consistent w/ SIADH given the elevated urine sodium. However, given his poor PO this is likely contributing as well. Sodium initially improved on fluids now worsening again.   -would hold further fluids for now  -obtain urine studies   -1.5 L fluid restrict. However, pt not eating drinking much to begin with  -repeat BMP in 6hours after fluids stopped   -if levels stable or slightly improved will start salt tabs. If improved at appropriate rate would c/w just fluid restriction. If sodium getting worse will consult renal for assistance. Sodium correction should not exceed 8 MEq in a 24 hour period.     Fluid collection in rectum- Pt reports having mucous drainage from his rectum that improved previously but now has returned and increased. He describes it to be white/clear in color. Unclear if this is sequela from his underlining disease vs infection. He has been afebrile but presented meeting SIRS criteria w/ tachycardia and leukocytosis. He is s/p dose of zosyn in ED. Pt currently HD stable  -Will d/w surgery and radiology. Based on conversation will consult ID   -obtain blood cxs    Moderate R right hydroureteronephrosis - Noted on CT A/P. Renal function appears stable  -continue to monitor renal function for now   -will consider urology eval based on clinical course     Stage 4 Rectosigmoid cancer w/ progression  -Heme onc recs appreciated. No plan for inpatient chemo at this time. Will continue to appreciate recs.     Advanced Care Planning:  Spoke w/ patient about his current status and disease progression. His two sons are his HCPs. Discussed code status and risk of chest compression and intubation. Pt expresses that he would not want these things. He states that he is old and when it is his time he wants to go peacefully. However, he would pursue further treatment of his cancer if it is being offered and if it will be helpful. Spent 17 min in face to face advanced care planning conversation.

## 2020-04-03 NOTE — H&P ADULT - NSHPREVIEWOFSYSTEMS_GEN_ALL_CORE
Review of Systems:  Constitutional: No fever, No weight loss, poor appetite/po intake  Head: No headache   Eyes: No blurry vision, No diplopia  Neuro: No tremors, No muscle weakness   Cardiovascular: No chest pain, No palpitations  Respiratory: No SOB, No cough  GI: No nausea, No vomiting, No diarrhea, + Rectal mucus discharge  : No dysuria, No hematuria  Skin: No rash  MSK: No joint pain

## 2020-04-03 NOTE — CONSULT NOTE ADULT - SUBJECTIVE AND OBJECTIVE BOX
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 During hospitalization, a subsequent attempt was made to explore the patient and excise the lesion however the rectosigmoid mass was found to be invading the bladder and was unresectable.  Patient was discharged from the hospital in mid march and had plans to resume chemotherapy on 4/6/20.     Currently patient presenting with worsening rectal discharge that he describes as mucus. Denies bloody discharge. Patient also endorsing weakness and decreased po intake. Patient denies fevers, dizziness, URI symptoms, chest pain, sob, n/v/d or urinary symptoms. Patient says that ostomy has been functioning normally.     In ED, patient afebrile, tachycardic, normotensive and sating well on RA. Patient found to be hyponatremic to 118 initially, with subsequent sodium 115.   CTAP revealed profound rectal and distal sigmoid wall edema, masslike at the sigmoid with ill-defined pericolic soft tissue, markedly increased from 12/13/2019, suspicious for progression of disease with concern for superimposed proctocolitis. The pericolic soft tissue is inseparable from the bladder dome and bladder wall involvement cannot be excluded. In the low rectum, there is a 2.8 cm ill-defined fluid collection, likely corresponding to the report of purulent mucinous drainage. Also, new extensive conglomerate and centrally necrotic retroperitoneal lymphadenopathy, reflecting progression of disease. Moderate right hydroureteronephrosis to the level of the necrotic lymphadenopathy. (03 Apr 2020 07:49)"    Above reviewed. ....    PAST MEDICAL & SURGICAL HISTORY:  Diverticulosis  Diabetes mellitus: type 2  Prostate Cancer: denies chemo and radiation  Renal Cancer: denies chemo and radiation  Hyponatremia  Hyperlipidemia  Benign Essential Hypertension  History of chemotherapy: chemo port placement  H/O cystoscopy: 5/2019 dilation of urethra  H/O exploratory laparotomy: lysis of adhesions, biopsy 5/2019  Bowel obstruction: colostomy placement 1/2019  H/O prostatectomy: 2001  H/O partial nephrectomy: left, 2000- benign tumor    Allergies    latex (Rash)  No Known Drug Allergies    ANTIMICROBIALS:  Zosyn x1    OTHER MEDS:  acetaminophen   Tablet .. 650 milliGRAM(s) Oral every 6 hours PRN  aspirin enteric coated 81 milliGRAM(s) Oral daily  dextrose 40% Gel 15 Gram(s) Oral once PRN  dextrose 5%. 1000 milliLiter(s) IV Continuous <Continuous>  dextrose 50% Injectable 12.5 Gram(s) IV Push once  dextrose 50% Injectable 25 Gram(s) IV Push once  dextrose 50% Injectable 25 Gram(s) IV Push once  glucagon  Injectable 1 milliGRAM(s) IntraMuscular once PRN  heparin  Injectable 5000 Unit(s) SubCutaneous every 8 hours  insulin lispro (HumaLOG) corrective regimen sliding scale   SubCutaneous three times a day before meals  insulin lispro (HumaLOG) corrective regimen sliding scale   SubCutaneous at bedtime  oxyCODONE    IR 5 milliGRAM(s) Oral every 8 hours PRN    SOCIAL HISTORY: No tobacco, no alcohol, no illicit drugs    FAMILY HISTORY:  FH: ovarian cancer: sister  FH: kidney cancer: brother    Drug Dosing Weight  Height (cm): 154.9 (03 Apr 2020 01:21)  Weight (kg): 53.524 (03 Apr 2020 01:21)  BMI (kg/m2): 22.3 (03 Apr 2020 01:21)  BSA (m2): 1.51 (03 Apr 2020 01:21)    PE:    Vital Signs Last 24 Hrs  T(C): 36.8 (03 Apr 2020 07:00), Max: 37.1 (02 Apr 2020 21:24)  T(F): 98.2 (03 Apr 2020 07:00), Max: 98.7 (02 Apr 2020 21:24)  HR: 96 (03 Apr 2020 07:00) (96 - 112)  BP: 122/85 (03 Apr 2020 07:00) (122/85 - 140/78)  RR: 17 (03 Apr 2020 07:00) (16 - 18)  SpO2: 96% (03 Apr 2020 07:00) (96% - 100%)    Gen: AOx3, NAD, non-toxic  CV: S1+S2 normal, nontachycardic  Resp: Clear bilat, no resp distress, no crackles/wheezes  Abd: Soft, nontender, +BS  Ext: No LE edema, no wounds  : No Shepard  IV/Skin: No thrombophlebitis  Msk: No low back pain, no arthralgias, no joint swelling  Neuro: No sensory deficits, no motor deficits    LABS:                        11.5   15.43 )-----------( 455      ( 03 Apr 2020 07:20 )             32.5     04-03    117<LL>  |  84<L>  |  24<H>  ----------------------------<  163<H>  3.7   |  20<L>  |  1.16    Ca    9.0      03 Apr 2020 12:20  Phos  3.0     04-03  Mg     2.1     04-03    TPro  7.4  /  Alb  3.2<L>  /  TBili  0.7  /  DBili  x   /  AST  16  /  ALT  30  /  AlkPhos  162<H>  04-03    MICROBIOLOGY:    No new available    RADIOLOGY:    4/2 CT:    IMPRESSION:     Profound rectal and distal sigmoid wall edema, masslike at the sigmoid with ill-defined pericolic soft tissue, markedly increased from 12/13/2019, suspicious for progression of disease with concern for superimposed proctocolitis. The pericolic soft tissue is inseparable from the bladder dome and bladder wall involvement cannot be excluded. In the low rectum, there is a 2.8 cm ill-defined fluid collection, likely corresponding to the report of purulent mucinous drainage.   Also, new extensive conglomerate and centrally necrotic retroperitoneal lymphadenopathy, reflecting progression of disease. Moderate right hydroureteronephrosis to the level of the necrotic lymphadenopathy.    Trace bilateral pleural effusions with bibasilar subsegmental atelectasis. A new 3 mm right middle lobe subpleural nodule. 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 During hospitalization, a subsequent attempt was made to explore the patient and excise the lesion however the rectosigmoid mass was found to be invading the bladder and was unresectable.  Patient was discharged from the hospital in mid march and had plans to resume chemotherapy on 4/6/20.     Currently patient presenting with worsening rectal discharge that he describes as mucus. Denies bloody discharge. Patient also endorsing weakness and decreased po intake. Patient denies fevers, dizziness, URI symptoms, chest pain, sob, n/v/d or urinary symptoms. Patient says that ostomy has been functioning normally.     In ED, patient afebrile, tachycardic, normotensive and sating well on RA. Patient found to be hyponatremic to 118 initially, with subsequent sodium 115.   CTAP revealed profound rectal and distal sigmoid wall edema, masslike at the sigmoid with ill-defined pericolic soft tissue, markedly increased from 12/13/2019, suspicious for progression of disease with concern for superimposed proctocolitis. The pericolic soft tissue is inseparable from the bladder dome and bladder wall involvement cannot be excluded. In the low rectum, there is a 2.8 cm ill-defined fluid collection, likely corresponding to the report of purulent mucinous drainage. Also, new extensive conglomerate and centrally necrotic retroperitoneal lymphadenopathy, reflecting progression of disease. Moderate right hydroureteronephrosis to the level of the necrotic lymphadenopathy. (03 Apr 2020 07:49)"    Above reviewed. Patient with prior CA history. Most recently found to have stage IV rectosigmoid CA. Patient had LBO. Had further attempt to resect lesion but was found to be invading bladder and unresectable. Patient now with rectal discharge, mucous. Not bloody. Has some pain around rectum. No fevers, no chills. No new complaints. No abd pain. Otherwise generalized fatigue. ID called for further eval leukocytosis.    PAST MEDICAL & SURGICAL HISTORY:  Diverticulosis  Diabetes mellitus: type 2  Prostate Cancer: denies chemo and radiation  Renal Cancer: denies chemo and radiation  Hyponatremia  Hyperlipidemia  Benign Essential Hypertension  History of chemotherapy: chemo port placement  H/O cystoscopy: 5/2019 dilation of urethra  H/O exploratory laparotomy: lysis of adhesions, biopsy 5/2019  Bowel obstruction: colostomy placement 1/2019  H/O prostatectomy: 2001  H/O partial nephrectomy: left, 2000- benign tumor    Allergies    latex (Rash)  No Known Drug Allergies    ANTIMICROBIALS:  Zosyn x1    OTHER MEDS:  acetaminophen   Tablet .. 650 milliGRAM(s) Oral every 6 hours PRN  aspirin enteric coated 81 milliGRAM(s) Oral daily  dextrose 40% Gel 15 Gram(s) Oral once PRN  dextrose 5%. 1000 milliLiter(s) IV Continuous <Continuous>  dextrose 50% Injectable 12.5 Gram(s) IV Push once  dextrose 50% Injectable 25 Gram(s) IV Push once  dextrose 50% Injectable 25 Gram(s) IV Push once  glucagon  Injectable 1 milliGRAM(s) IntraMuscular once PRN  heparin  Injectable 5000 Unit(s) SubCutaneous every 8 hours  insulin lispro (HumaLOG) corrective regimen sliding scale   SubCutaneous three times a day before meals  insulin lispro (HumaLOG) corrective regimen sliding scale   SubCutaneous at bedtime  oxyCODONE    IR 5 milliGRAM(s) Oral every 8 hours PRN    SOCIAL HISTORY: No tobacco, no alcohol, no illicit drugs    FAMILY HISTORY:  FH: ovarian cancer: sister  FH: kidney cancer: brother    Drug Dosing Weight  Height (cm): 154.9 (03 Apr 2020 01:21)  Weight (kg): 53.524 (03 Apr 2020 01:21)  BMI (kg/m2): 22.3 (03 Apr 2020 01:21)  BSA (m2): 1.51 (03 Apr 2020 01:21)    PE:    Vital Signs Last 24 Hrs  T(C): 36.8 (03 Apr 2020 07:00), Max: 37.1 (02 Apr 2020 21:24)  T(F): 98.2 (03 Apr 2020 07:00), Max: 98.7 (02 Apr 2020 21:24)  HR: 96 (03 Apr 2020 07:00) (96 - 112)  BP: 122/85 (03 Apr 2020 07:00) (122/85 - 140/78)  RR: 17 (03 Apr 2020 07:00) (16 - 18)  SpO2: 96% (03 Apr 2020 07:00) (96% - 100%)    Gen: AOx3, NAD, non-toxic, chronically ill appearing  CV: S1+S2 normal, nontachycardic  Resp: Clear bilat, no resp distress, no crackles/wheezes  Abd: Soft, nontender, +BS, ostomy; mucous discharge from rectum noted--not purulent  Ext: No LE edema, no wounds  : No Shepard  IV/Skin: No thrombophlebitis, no rash  Msk: No low back pain, no arthralgias, no joint swelling  Neuro: No sensory deficits, no motor deficits    LABS:                        11.5   15.43 )-----------( 455      ( 03 Apr 2020 07:20 )             32.5     04-03    117<LL>  |  84<L>  |  24<H>  ----------------------------<  163<H>  3.7   |  20<L>  |  1.16    Ca    9.0      03 Apr 2020 12:20  Phos  3.0     04-03  Mg     2.1     04-03    TPro  7.4  /  Alb  3.2<L>  /  TBili  0.7  /  DBili  x   /  AST  16  /  ALT  30  /  AlkPhos  162<H>  04-03    MICROBIOLOGY:    No new available    RADIOLOGY:    4/2 CT:    IMPRESSION:     Profound rectal and distal sigmoid wall edema, masslike at the sigmoid with ill-defined pericolic soft tissue, markedly increased from 12/13/2019, suspicious for progression of disease with concern for superimposed proctocolitis. The pericolic soft tissue is inseparable from the bladder dome and bladder wall involvement cannot be excluded. In the low rectum, there is a 2.8 cm ill-defined fluid collection, likely corresponding to the report of purulent mucinous drainage.   Also, new extensive conglomerate and centrally necrotic retroperitoneal lymphadenopathy, reflecting progression of disease. Moderate right hydroureteronephrosis to the level of the necrotic lymphadenopathy.    Trace bilateral pleural effusions with bibasilar subsegmental atelectasis. A new 3 mm right middle lobe subpleural nodule.

## 2020-04-03 NOTE — H&P ADULT - PROBLEM SELECTOR PLAN 2
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  - Plan was for patient to resume chemotherapy on 4/06 with 5FU/Vectibix  - CT here with profound rectal and distal sigmoid wall edema, masslike at the sigmoid with ill-defined pericolic soft tissue, markedly increased from 12/13/2019, suspicious for progression of disease with concern for superimposed proctocolitis. The pericolic soft tissue is inseparable from the bladder dome and bladder wall involvement cannot be excluded. In the low rectum, there is a 2.8 cm ill-defined fluid collection, likely corresponding to the report of purulent mucinous drainage.   Also, new extensive conglomerate and centrally necrotic retroperitoneal lymphadenopathy, reflecting progression of disease.   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  - Plan was for patient to resume chemotherapy on 4/06 with 5FU/Vectibix  - CT here with profound rectal and distal sigmoid wall edema, masslike at the sigmoid with ill-defined pericolic soft tissue, markedly increased from 12/13/2019, suspicious for progression of disease with concern for superimposed proctocolitis. The pericolic soft tissue is inseparable from the bladder dome and bladder wall involvement cannot be excluded. In the low rectum, there is a 2.8 cm ill-defined fluid collection, likely corresponding to the report of purulent mucinous drainage.   Also, new extensive conglomerate and centrally necrotic retroperitoneal lymphadenopathy, reflecting progression of disease.   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  - Plan was for patient to resume chemotherapy on 4/06 with 5FU/Vectibix  - CT here with profound rectal and distal sigmoid wall edema, masslike at the sigmoid with ill-defined pericolic soft tissue, markedly increased from 12/13/2019, suspicious for progression of disease with concern for superimposed proctocolitis. The pericolic soft tissue is inseparable from the bladder dome and bladder wall involvement cannot be excluded. In the low rectum, there is a 2.8 cm ill-defined fluid collection, likely corresponding to the report of purulent mucinous drainage.   Also, new extensive conglomerate and centrally necrotic retroperitoneal lymphadenopathy, reflecting progression of disease.   - Surgery consulted, did not recommend any acute surgical intervention   - will consult Oncology to determine plan moving forward  - will call ID about fluid collection   - Pain control with tylenol for mild/moderate, oxycodone for severe pain

## 2020-04-03 NOTE — CONSULT NOTE ADULT - SUBJECTIVE AND OBJECTIVE BOX
Hematology/Oncology Consult Note    HPI:  75 year old male with history of Prostate Cancer s/p resection in  2000, left renal cell carcinoma s/p L partial nephrectomy in 2000, HLD, HTN, rectosigmoid cancer (signet cell adenocarcinoma) s/p exlap and creation of diverting ileostomy and mucus fistula for LBO presents with worsening rectal discharge and was admitted for hypernatremia, ROMELIA, and rectal discharge. Denies bloody discharge, fevers, dizziness, cough, chest pain, sob, n/v/d or urinary symptoms.     PAST MEDICAL & SURGICAL HISTORY:  Diverticulosis  Diabetes mellitus: type 2  Prostate Cancer: denies chemo and radiation  Renal Cancer: denies chemo and radiation  Hyponatremia  Hyperlipidemia  Benign Essential Hypertension  History of chemotherapy: chemo port placement  H/O cystoscopy: 5/2019 dilation of urethra  H/O exploratory laparotomy: lysis of adhesions, biopsy 5/2019  Bowel obstruction: colostomy placement 1/2019  H/O prostatectomy: 2001  H/O partial nephrectomy: left, 2000- benign tumor      FAMILY HISTORY:  FH: ovarian cancer: sister  FH: kidney cancer: brother      MEDICATIONS  (STANDING):  aspirin enteric coated 81 milliGRAM(s) Oral daily  dextrose 5%. 1000 milliLiter(s) (50 mL/Hr) IV Continuous <Continuous>  dextrose 50% Injectable 12.5 Gram(s) IV Push once  dextrose 50% Injectable 25 Gram(s) IV Push once  dextrose 50% Injectable 25 Gram(s) IV Push once  heparin  Injectable 5000 Unit(s) SubCutaneous every 8 hours  insulin lispro (HumaLOG) corrective regimen sliding scale   SubCutaneous three times a day before meals  insulin lispro (HumaLOG) corrective regimen sliding scale   SubCutaneous at bedtime    MEDICATIONS  (PRN):  acetaminophen   Tablet .. 650 milliGRAM(s) Oral every 6 hours PRN Mild Pain (1 - 3), Moderate Pain (4 - 6)  dextrose 40% Gel 15 Gram(s) Oral once PRN Blood Glucose LESS THAN 70 milliGRAM(s)/deciliter  glucagon  Injectable 1 milliGRAM(s) IntraMuscular once PRN Glucose LESS THAN 70 milligrams/deciliter  oxyCODONE    IR 5 milliGRAM(s) Oral every 8 hours PRN Severe Pain (7 - 10)      Allergies    latex (Rash)  No Known Drug Allergies    VITAL SIGNS:  Height (cm): 154.9 (04-03 @ 01:21)  Weight (kg): 53.524 (04-03 @ 01:21)  BMI (kg/m2): 22.3 (04-03 @ 01:21)  BSA (m2): 1.51 (04-03 @ 01:21)  T(F): 98.2 (04-03-20 @ 07:00), Max: 98.7 (04-02-20 @ 21:24)  HR: 96 (04-03-20 @ 07:00)  BP: 122/85 (04-03-20 @ 07:00)  RR: 17 (04-03-20 @ 07:00)  SpO2: 96% (04-03-20 @ 07:00)  Wt(kg): --      LABS:                        11.5   15.43 )-----------( 455      ( 03 Apr 2020 07:20 )             32.5     04-03    123<L>  |  88<L>  |  23  ----------------------------<  108<H>  3.8   |  21<L>  |  1.32<H>    Ca    9.3      03 Apr 2020 07:20  Phos  3.0     04-03  Mg     2.1     04-03    TPro  7.4  /  Alb  3.2<L>  /  TBili  0.7  /  DBili  x   /  AST  16  /  ALT  30  /  AlkPhos  162<H>  04-03    PT/INR - ( 02 Apr 2020 16:30 )   PT: 13.1 SEC;   INR: 1.13          PTT - ( 02 Apr 2020 16:30 )  PTT:28.3 SEC Phosphorus Level, Serum: 3.0 mg/dL (04-03 @ 07:20)  Magnesium, Serum: 2.1 mg/dL (04-03 @ 07:20)  Phosphorus Level, Serum: 2.9 mg/dL (04-03 @ 02:45)  Magnesium, Serum: 2.1 mg/dL (04-03 @ 02:45)    RADIOLOGY & ADDITIONAL TESTS:  < from: CT Abdomen and Pelvis w/ IV Cont (04.02.20 @ 18:52) >  FINDINGS:    LOWER CHEST: A 0.3 cm right middle lobe subpleural nodule (series 2, image 5), new. Trace bilateral pleural effusions, asymmetric to the left, with bibasilar compressive atelectasis. A partially imaged Mediport catheter terminating in the SVC/RA. Coronary artery atherosclerotic calcifications.    LIVER: Hepatic cysts and additional subcentimeter hypodensities that are too small to characterize, stable. No suspicious enhancing lesions.  BILE DUCTS: Normal caliber.  GALLBLADDER: Withinnormal limits.  SPLEEN: Within normal limits.  PANCREAS: Fatty atrophy.  ADRENALS: Within normal limits.  KIDNEYS/URETERS: Left renal postsurgical change. Right renal cysts and additional subcentimeter hypodensities bilaterally that are too small to characterize. Delayed right nephrogram with moderate right hydroureteronephrosis to the level of retroperitoneal lymphadenopathy.    BLADDER: Marked masslike wall thickening of the distal sigmoid, described below, with pericolic soft tissue inseparable from the bladder dome, raising concern for bladder involvement.  REPRODUCTIVE ORGANS: Prostatectomy.    BOWEL: Profound rectal and distal sigmoid wall edema with asymmetric mural enhancement and masslike thickening at the sigmoid with ill-defined pericolic soft tissue/metastatic implants, markedly increased from 12/13/2019. Within the low rectum, there is an ill-defined, at least partially intraluminal, fluid collection measuring 2.8 x 2.8 x 2.3 cm (series 2, image 108 and series 601, image 56).Left hemiabdomen mucous fistula and a right hemiabdomen colostomy. Normal appendix. Scattered colonic diverticula without acute diverticulitis. No bowel obstruction.  PERITONEUM: Trace pelvic ascites. Perisigmoid soft tissue, as above, likely metastatic implants.  VESSELS: Atherosclerotic change. Patent portal and hepatic veins. Bilateral common iliac veins and external iliac veins are markedly compressed by the extensive retroperitoneal lymphadenopathy.  RETROPERITONEUM/LYMPH NODES: Extensive conglomerate necrotic retroperitoneal and bilateral pelvic sidewall lymphadenopathy. Lymphadenopathy is inseparable from the bilateral psoas musculature. Lymphadenopathy surrounds the aorta and the distal IVC. A discrete enlarged left external iliac/common femoral node measures 3.9 x 1.9 cm (series 2, image 96).    ABDOMINAL WALL: Postsurgical changes.  BONES: Degenerative changes. No suspicious osseous lesions.    IMPRESSION:     Profound rectal and distal sigmoid wall edema, masslike at the sigmoid with ill-defined pericolic soft tissue, markedly increased from 12/13/2019, suspicious for progression of disease with concern for superimposed proctocolitis. The pericolic soft tissue is inseparable from the bladder dome and bladder wall involvement cannot be excluded. In the low rectum, there is a 2.8 cm ill-defined fluid collection, likely corresponding to the report of purulent mucinous drainage.   Also, new extensive conglomerate and centrally necrotic retroperitoneal lymphadenopathy, reflecting progression of disease. Moderate right hydroureteronephrosis to the level of the necrotic lymphadenopathy.    Trace bilateral pleural effusions with bibasilar subsegmental atelectasis. A new 3 mm right middle lobe subpleural nodule.    < end of copied text >

## 2020-04-03 NOTE — H&P ADULT - HISTORY OF PRESENT ILLNESS
Mary Ren M.D.   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 During hospitalization, a subsequent attempt was made to explore the patient and excise the lesion however the rectosigmoid mass was found to be invading the bladder and was unresectable.  Patient was discharged from the hospital in mid march and had plans to resume chemotherapy on 4/6/20.     Currently patient presenting with worsening rectal discharge that he describes as mucus.  Patient also endorsing weakness and decreased po intake. Patient denies fevers, URI symptoms, chest pain, sob, n/v/d or urinary symptoms. Patient says that ostomy has been functioning normally.     In ED, patient afebrile, tachycardic, normotensive and sating well on RA. Patient found to be hyponatremic to 118 initially, with subsequent sodium 115.   CTAP revealed profound rectal and distal sigmoid wall edema, masslike at the sigmoid with ill-defined pericolic soft tissue, markedly increased from 12/13/2019, suspicious for progression of disease with concern for superimposed proctocolitis. The pericolic soft tissue is inseparable from the bladder dome and bladder wall involvement cannot be excluded. In the low rectum, there is a 2.8 cm ill-defined fluid collection, likely corresponding to the report of purulent mucinous drainage. Also, new extensive conglomerate and centrally necrotic retroperitoneal lymphadenopathy, reflecting progression of disease. Moderate right hydroureteronephrosis to the level of the necrotic lymphadenopathy. Mary Ren M.D.   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 During hospitalization, a subsequent attempt was made to explore the patient and excise the lesion however the rectosigmoid mass was found to be invading the bladder and was unresectable.  Patient was discharged from the hospital in mid march and had plans to resume chemotherapy on 4/6/20.     Currently patient presenting with worsening rectal discharge that he describes as mucus. Denies bloody discharge. Patient also endorsing weakness and decreased po intake. Patient denies fevers, dizziness, URI symptoms, chest pain, sob, n/v/d or urinary symptoms. Patient says that ostomy has been functioning normally.     In ED, patient afebrile, tachycardic, normotensive and sating well on RA. Patient found to be hyponatremic to 118 initially, with subsequent sodium 115.   CTAP revealed profound rectal and distal sigmoid wall edema, masslike at the sigmoid with ill-defined pericolic soft tissue, markedly increased from 12/13/2019, suspicious for progression of disease with concern for superimposed proctocolitis. The pericolic soft tissue is inseparable from the bladder dome and bladder wall involvement cannot be excluded. In the low rectum, there is a 2.8 cm ill-defined fluid collection, likely corresponding to the report of purulent mucinous drainage. Also, new extensive conglomerate and centrally necrotic retroperitoneal lymphadenopathy, reflecting progression of disease. Moderate right hydroureteronephrosis to the level of the necrotic lymphadenopathy.

## 2020-04-03 NOTE — H&P ADULT - NSHPPHYSICALEXAM_GEN_ALL_CORE
Vital Signs Last 24 Hrs  T(C): 36.8 (03 Apr 2020 01:21), Max: 37.1 (02 Apr 2020 21:24)  T(F): 98.2 (03 Apr 2020 01:21), Max: 98.7 (02 Apr 2020 21:24)  HR: 100 (03 Apr 2020 01:21) (100 - 112)  BP: 126/72 (03 Apr 2020 01:21) (126/72 - 140/78)  BP(mean): --  RR: 18 (03 Apr 2020 01:21) (16 - 18)  SpO2: 96% (03 Apr 2020 01:21) (96% - 100%) Vital Signs Last 24 Hrs  T(C): 36.8 (03 Apr 2020 01:21), Max: 37.1 (02 Apr 2020 21:24)  T(F): 98.2 (03 Apr 2020 01:21), Max: 98.7 (02 Apr 2020 21:24)  HR: 100 (03 Apr 2020 01:21) (100 - 112)  BP: 126/72 (03 Apr 2020 01:21) (126/72 - 140/78)  BP(mean): --  RR: 18 (03 Apr 2020 01:21) (16 - 18)  SpO2: 96% (03 Apr 2020 01:21) (96% - 100%)    PHYSICAL EXAM  GENERAL: NAD, lying comfortably in bed   HEAD:  Atraumatic, Normocephalic  EYES: EOMI b/l, PERRLA b/l, conjunctiva and sclera clear  NECK: Supple, No JVD, No LAD   CHEST/LUNG: Clear to auscultation bilaterally; No wheeze or ronchi  HEART: Regular rate and rhythm; S1 and S2 present, No murmurs, rubs, or gallops  ABDOMEN: Soft, Nontender, Nondistended; Bowel sounds present; +Colostomy tube with brown feces, c/d/i  EXTREMITIES:  2+ Peripheral Pulses, No clubbing, cyanosis, or edema  NEURO: AAOx3, non-focal   SKIN: No rashes or lesions

## 2020-04-03 NOTE — H&P ADULT - PROBLEM SELECTOR PLAN 1
Sodium 118 on admission, repeat Na 115 after NS bolus. Patient with overall weakness, mental status fine, denies dizziness, n/v  - Patient with history of hyponatremia, Na 127-130 on most recent admission in March. Patient was treated with salt tabs at that time.   - Serum osmolality 258  - May be secondary to hypovolemic hyponatremia as patient endorsing decrease po intake. Can also be SIADH in setting of cancer and/or pain. Patient has been on NS at 100 cc, most recent BMP still pending. Depending on results, will either fluid restrict or c/w IVF's  - f/u urine studies to better determine etiology  - continue to monitor BMP q 6hrs

## 2020-04-03 NOTE — H&P ADULT - PROBLEM SELECTOR PLAN 3
Patient currently with SBP in 120's  - will hold off on home Lisinopril 10 mg at this time in setting of likely dehydration

## 2020-04-03 NOTE — CONSULT NOTE ADULT - ATTENDING COMMENTS
The patient was seen and examined today with the fellow, Dr. Engel, and I agree with the History, Physical Exam and Plan in the record. Labs and imaging reviewed by me.

## 2020-04-03 NOTE — H&P ADULT - PROBLEM SELECTOR PLAN 6
1.  Name of PCP:   2.  PCP Contacted on Admission: [  ] Yes   3.  PCP contacted at Discharge: [  ] Y    [ ] N    [ ] N/A  4.  Post-Discharge Appointment Date and Location:  5.  Summary of Handoff given to PCP:

## 2020-04-03 NOTE — PROGRESS NOTE ADULT - SUBJECTIVE AND OBJECTIVE BOX
SURGERY DAILY PROGRESS NOTE:     SUBJECTIVE/24hr Events:     Patient seen and examined on am rounds. Pt severely hyponatremic. No acute events overnight.      OBJECTIVE:    MEDICATIONS  (STANDING):  sodium chloride 0.9%. 1000 milliLiter(s) (100 mL/Hr) IV Continuous <Continuous>    MEDICATIONS  (PRN):      Vital Signs Last 24 Hrs  T(C): 36.8 (03 Apr 2020 01:21), Max: 37.1 (02 Apr 2020 21:24)  T(F): 98.2 (03 Apr 2020 01:21), Max: 98.7 (02 Apr 2020 21:24)  HR: 100 (03 Apr 2020 01:21) (100 - 112)  BP: 126/72 (03 Apr 2020 01:21) (126/72 - 140/78)  BP(mean): --  RR: 18 (03 Apr 2020 01:21) (16 - 18)  SpO2: 96% (03 Apr 2020 01:21) (96% - 100%)      I&O's Detail        Daily Height in cm: 154.94 (03 Apr 2020 01:21)    Daily           LABS:                        11.4   16.84 )-----------( 448      ( 02 Apr 2020 16:30 )             32.8     04-03    115<LL>  |  80<L>  |  22  ----------------------------<  116<H>  3.8   |  19<L>  |  1.16    Ca    9.2      03 Apr 2020 02:45  Phos  2.9     04-03  Mg     2.1     04-03    TPro  7.9  /  Alb  3.6  /  TBili  0.7  /  DBili  x   /  AST  28  /  ALT  40  /  AlkPhos  192<H>  04-02    PT/INR - ( 02 Apr 2020 16:30 )   PT: 13.1 SEC;   INR: 1.13          PTT - ( 02 Apr 2020 16:30 )  PTT:28.3 SEC              PHYSICAL EXAM:  Constitutional: well developed, well nourished, NAD  ENMT: normal facies, symmetric  Respiratory: Normal respiratory effort   Abdomen:

## 2020-04-03 NOTE — PROGRESS NOTE ADULT - ASSESSMENT
74yo M with hx prostate ca s/p resection (2000), left kidney ca s/p L partial nephrectomy (2000), HLD, HTN, with known stage IV unresectabe rectosigmoid cancer (signet cell adenocarcinoma) involving the bladder with history of creation of end ileostomy and mucus fistula.  Patient presents today with weakness and electrolyte imbalance suggestive of dehydration with necrotic unresectable rectosigmoid mass.    - No acute surgical intervention   - c.w hydration, electrolyte repletion  - consider checking UA to eval for underlying infection  - Regular diet as tolerated    - Heme/Onc with plans to initiate chemo on Monday   - Likely imaging findings due to necrotic tumor, less likely proctocolitis    [facility]   [id number] 74yo M with hx prostate ca s/p resection (2000), left kidney ca s/p L partial nephrectomy (2000), HLD, HTN, with known stage IV unresectabe rectosigmoid cancer (signet cell adenocarcinoma) involving the bladder with history of creation of end ileostomy and mucus fistula.  Patient presents today with weakness and electrolyte imbalance suggestive of dehydration with necrotic unresectable rectosigmoid mass.    - No acute surgical intervention   - c.w hydration, electrolyte repletion  - consider checking UA to eval for underlying infection  - Regular diet as tolerated    - Heme/Onc with plans to initiate chemo on Monday   - Likely imaging findings due to necrotic tumor, less likely proctocolitis    Will sign off, please call with any questions  [facility]   [id number]

## 2020-04-03 NOTE — CONSULT NOTE ADULT - ASSESSMENT
75 year old male with history of Prostate Cancer s/p resection in  2000, left renal cell carcinoma s/p L partial nephrectomy in 2000, HLD, HTN, rectosigmoid cancer (signet cell adenocarcinoma) s/p exlap and creation of diverting ileostomy and mucus fistula for LBO presents with worsening rectal discharge and was admitted for hypernatremia, ROMELIA, and rectal discharge. Denies bloody discharge, fevers, dizziness, cough, chest pain, sob, n/v/d or urinary symptoms.     # Rectosigmoid     -No systemic therapy inpatient  -F/U with Dr. Villalobos outpatient after discharge  -Appreciate surgery's recs regarding management of rectal discharge until he starts chemotherapy outpatient    # Hyponatremia  Likely hypovolemic at this moment. Need to reassess after fluid resucitation because she could have ADH related hyponatremia from malignancy.   -IVF per primary  -Monitor Na    # ROMELIA 75 year old male with history of Prostate Cancer s/p resection in  2000, left renal cell carcinoma s/p L partial nephrectomy in 2000, HLD, HTN, rectosigmoid cancer (signet cell adenocarcinoma) s/p exlap and creation of diverting ileostomy and mucus fistula for LBO presents with worsening rectal discharge and was admitted for hypernatremia, ROMELIA, and rectal discharge. Denies bloody discharge, fevers, dizziness, cough, chest pain, sob, n/v/d or urinary symptoms.     # Metastatic adenocarcinoma of rectosigmoid colon  SYstemic treatment naive. He was supposed to start systemic therapy on 4/6.  CT AP revealed profound local lesions markedly increased from 12/13/2019 with concern for superimposed proctocolitis. Bladder wall involvement cannot be excluded. There is a 2.8 cm ill-defined fluid collection which is likely causing purulent mucinous rectal drainage.   -No systemic therapy inpatient  -F/U with Dr. Villalobos outpatient after discharge  -Appreciate surgery's recs regarding management of rectal discharge until he starts chemotherapy outpatient    # Hyponatremia  Likely hypovolemic at this moment. Need to reassess after fluid resucitation because she could have ADH related hyponatremia from malignancy.   -IVF per primary  -Monitor Na    # ROMELIA  # Moderate right hydronephrosis  He has definitely pre-renal component becuse of poor oral intake. Not clear new moderate right hydroureteronephrosishe is contibuting this.  -IVF per primary  -Monitor renal function  -If cre does not improve or gets worse even with sufficient IVF, may need urology consult\    The case was discussed with Dr. Brandin Engel MD, MPH  Hematology/Oncology Fellow  Pager: [phone]

## 2020-04-03 NOTE — CONSULT NOTE ADULT - ASSESSMENT
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) that has previously been diagnosed when he presented with a LBO , now s/p exlap and creation of diverting ileostomy and mucus fistula  Leukocytosis, no fever  CT A/P rectal CA with possible proctocolitis and collection  Uncertain malignancy vs colitis/infection  Has leukocytosis (malignancy vs infection?)  Overall,  1) Abscess/colitis    2) Malignancy    3) Leukocytosis    FULL consult to follow 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) that has previously been diagnosed when he presented with a LBO , now s/p exlap and creation of diverting ileostomy and mucus fistula  Leukocytosis, no fever  CT A/P rectal CA with possible proctocolitis and collection  Uncertain malignancy vs colitis/infection  Has leukocytosis (malignancy vs infection?)  Overall,  1) Abscess/colitis  - Would be very difficult to distinguish infection from malignancy, start trial abx and see if WBC responds  - Zosyn 3.375g q 8  - Appreciate surgery input, they favor malignancy > infection  2) Malignancy  - Tentative plan for chemo next week; safety would depend on degree suspicion for infection  3) Leukocytosis  - Check BCX x 2  - Note that if WBC non responsive to Zosyn, would argue that it is due to malignancy  - Monitor for alternate signs infection    Jass Schmitt MD  Pager [phone]  After 5pm and on weekends call [phone]

## 2020-04-03 NOTE — H&P ADULT - ASSESSMENT
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 Also found to be significantly hyponatremic to 115. 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 Also found to be significantly hyponatremic to 115.

## 2020-04-03 NOTE — H&P ADULT - NSHPLABSRESULTS_GEN_ALL_CORE
LABS AND IMAGING PERSONALLY REVIEWED:                            11.4   16.84 )-----------( 448      ( 02 Apr 2020 16:30 )             32.8       04-03    115<LL>  |  80<L>  |  22  ----------------------------<  116<H>  3.8   |  19<L>  |  1.16    Ca    9.2      03 Apr 2020 02:45  Phos  2.9     04-03  Mg     2.1     04-03    TPro  7.9  /  Alb  3.6  /  TBili  0.7  /  DBili  x   /  AST  28  /  ALT  40  /  AlkPhos  192<H>  04-02            PT/INR - ( 02 Apr 2020 16:30 )   PT: 13.1 SEC;   INR: 1.13          PTT - ( 02 Apr 2020 16:30 )  PTT:28.3 SEC        POCT Blood Glucose.: 109 mg/dL (03 Apr 2020 06:41)    < from: CT Abdomen and Pelvis w/ IV Cont (04.02.20 @ 18:52) >    Profound rectal and distal sigmoid wall edema, masslike at the sigmoid with ill-defined pericolic soft tissue, markedly increased from 12/13/2019, suspicious for progression of disease with concern for superimposed proctocolitis. The pericolic soft tissue is inseparable from the bladder dome and bladder wall involvement cannot be excluded. In the low rectum, there is a 2.8 cm ill-defined fluid collection, likely corresponding to the report of purulent mucinous drainage.   Also, new extensive conglomerate and centrally necrotic retroperitoneal lymphadenopathy, reflecting progression of disease. Moderate right hydroureteronephrosis to the level of the necrotic lymphadenopathy.    Trace bilateral pleural effusions with bibasilar subsegmental atelectasis. A new 3 mm right middle lobe subpleural nodule.      < end of copied text > LABS AND IMAGING PERSONALLY REVIEWED:                            11.4   16.84 )-----------( 448      ( 02 Apr 2020 16:30 )             32.8       04-03    115<LL>  |  80<L>  |  22  ----------------------------<  116<H>  3.8   |  19<L>  |  1.16  04-03    04-03        Ca    9.0      03 Apr 2020 12:20  Phos  3.0     04-03  Mg     2.1     04-03    TPro  7.4  /  Alb  3.2<L>  /  TBili  0.7  /  DBili  x   /  AST  16  /  ALT  30  /  AlkPhos  162<H>  04-03      Ca    9.0      03 Apr 2020 12:20  Phos  3.0     04-03  Mg     2.1     04-03    TPro  7.4  /  Alb  3.2<L>  /  TBili  0.7  /  DBili  x   /  AST  16  /  ALT  30  /  AlkPhos  162<H>  04-03      Ca    9.2      03 Apr 2020 02:45  Phos  2.9     04-03  Mg     2.1     04-03    TPro  7.9  /  Alb  3.6  /  TBili  0.7  /  DBili  x   /  AST  28  /  ALT  40  /  AlkPhos  192<H>  04-02            PT/INR - ( 02 Apr 2020 16:30 )   PT: 13.1 SEC;   INR: 1.13          PTT - ( 02 Apr 2020 16:30 )  PTT:28.3 SEC        POCT Blood Glucose.: 109 mg/dL (03 Apr 2020 06:41)    < from: CT Abdomen and Pelvis w/ IV Cont (04.02.20 @ 18:52) >    Profound rectal and distal sigmoid wall edema, masslike at the sigmoid with ill-defined pericolic soft tissue, markedly increased from 12/13/2019, suspicious for progression of disease with concern for superimposed proctocolitis. The pericolic soft tissue is inseparable from the bladder dome and bladder wall involvement cannot be excluded. In the low rectum, there is a 2.8 cm ill-defined fluid collection, likely corresponding to the report of purulent mucinous drainage.   Also, new extensive conglomerate and centrally necrotic retroperitoneal lymphadenopathy, reflecting progression of disease. Moderate right hydroureteronephrosis to the level of the necrotic lymphadenopathy.    Trace bilateral pleural effusions with bibasilar subsegmental atelectasis. A new 3 mm right middle lobe subpleural nodule.      < end of copied text >

## 2020-04-04 NOTE — PROGRESS NOTE ADULT - SUBJECTIVE AND OBJECTIVE BOX
PROGRESS NOTE:   Authored by Aime Tafoya MD, PGY 1, Pager [phone] [facility], [id number] [facility]     Patient is a 75y old  Male who presents with a chief complaint of hyponatremia (2020 14:06)      SUBJECTIVE / OVERNIGHT EVENTS:      MEDICATIONS  (STANDING):  aspirin enteric coated 81 milliGRAM(s) Oral daily  dextrose 5%. 1000 milliLiter(s) (50 mL/Hr) IV Continuous <Continuous>  dextrose 50% Injectable 12.5 Gram(s) IV Push once  dextrose 50% Injectable 25 Gram(s) IV Push once  dextrose 50% Injectable 25 Gram(s) IV Push once  heparin  Injectable 5000 Unit(s) SubCutaneous every 8 hours  insulin lispro (HumaLOG) corrective regimen sliding scale   SubCutaneous three times a day before meals  insulin lispro (HumaLOG) corrective regimen sliding scale   SubCutaneous at bedtime  piperacillin/tazobactam IVPB.. 3.375 Gram(s) IV Intermittent every 8 hours  sodium chloride 1 Gram(s) Oral two times a day    MEDICATIONS  (PRN):  acetaminophen   Tablet .. 650 milliGRAM(s) Oral every 6 hours PRN Mild Pain (1 - 3), Moderate Pain (4 - 6)  dextrose 40% Gel 15 Gram(s) Oral once PRN Blood Glucose LESS THAN 70 milliGRAM(s)/deciliter  glucagon  Injectable 1 milliGRAM(s) IntraMuscular once PRN Glucose LESS THAN 70 milligrams/deciliter  oxyCODONE    IR 5 milliGRAM(s) Oral every 8 hours PRN Severe Pain (7 - 10)      CAPILLARY BLOOD GLUCOSE      POCT Blood Glucose.: 116 mg/dL (2020 08:14)  POCT Blood Glucose.: 140 mg/dL (2020 21:45)  POCT Blood Glucose.: 90 mg/dL (2020 16:43)  POCT Blood Glucose.: 189 mg/dL (2020 11:47)    I&O's Summary      PHYSICAL EXAM:  Vital Signs Last 24 Hrs  T(C): 36.7 (2020 04:13), Max: 36.9 (2020 14:15)  T(F): 98 (2020 04:13), Max: 98.5 (2020 14:15)  HR: 80 (2020 04:13) (60 - 99)  BP: 154/88 (2020 04:13) (102/49 - 154/88)  BP(mean): --  RR: 16 (2020 04:13) (16 - 28)  SpO2: 98% (2020 04:13) (95% - 98%)    CONSTITUTIONAL: NAD  RESPIRATORY: Normal respiratory effort; lungs are clear to auscultation bilaterally  CARDIOVASCULAR: Regular rate and rhythm, normal S1 and S2, no murmur/rub/gallop  ABDOMEN: Nontender to palpation, Soft, Non-distended, normoactive bowel sounds,   MUSCLOSKELETAL: No LE edema   NEURO: Alert, good concentration     LABS:                        10.0   11.57 )-----------( 490      ( 2020 05:45 )             28.6     04-04    126<L>  |  91<L>  |  26<H>  ----------------------------<  103<H>  3.5   |  21<L>  |  1.40<H>    Ca    9.0      2020 05:45  Phos  2.7     04-04  Mg     2.0     04-04    TPro  7.4  /  Alb  3.2<L>  /  TBili  0.7  /  DBili  x   /  AST  16  /  ALT  30  /  AlkPhos  162<H>  04-03    PT/INR - ( 2020 16:30 )   PT: 13.1 SEC;   INR: 1.13          PTT - ( 2020 16:30 )  PTT:28.3 SEC      Urinalysis Basic - ( 2020 14:51 )    Color: LIGHT YELLOW / Appearance: CLEAR / S.035 / pH: 6.0  Gluc: NEGATIVE / Ketone: NEGATIVE  / Bili: NEGATIVE / Urobili: NORMAL   Blood: SMALL / Protein: 70 / Nitrite: NEGATIVE   Leuk Esterase: NEGATIVE / RBC: 0-2 / WBC 3-5   Sq Epi: OCC / Non Sq Epi: x / Bacteria: NEGATIVE          RADIOLOGY & ADDITIONAL TESTS:  Results Reviewed: PROGRESS NOTE:   Authored by Aime Tafoya MD, PGY 1, Pager [phone] [facility], [id number] [facility]     Patient is a 75y old  Male who presents with a chief complaint of hyponatremia (2020 14:06)      SUBJECTIVE / OVERNIGHT EVENTS: Pt seen and examined at bedside this AM. Pt continues to endorse intermittent 5/10 diffuse abd pain. Also continues to have mucus per rectum. Denies chest pain, SOB, nausea or vomiting.       MEDICATIONS  (STANDING):  aspirin enteric coated 81 milliGRAM(s) Oral daily  dextrose 5%. 1000 milliLiter(s) (50 mL/Hr) IV Continuous <Continuous>  dextrose 50% Injectable 12.5 Gram(s) IV Push once  dextrose 50% Injectable 25 Gram(s) IV Push once  dextrose 50% Injectable 25 Gram(s) IV Push once  heparin  Injectable 5000 Unit(s) SubCutaneous every 8 hours  insulin lispro (HumaLOG) corrective regimen sliding scale   SubCutaneous three times a day before meals  insulin lispro (HumaLOG) corrective regimen sliding scale   SubCutaneous at bedtime  piperacillin/tazobactam IVPB.. 3.375 Gram(s) IV Intermittent every 8 hours  sodium chloride 1 Gram(s) Oral two times a day    MEDICATIONS  (PRN):  acetaminophen   Tablet .. 650 milliGRAM(s) Oral every 6 hours PRN Mild Pain (1 - 3), Moderate Pain (4 - 6)  dextrose 40% Gel 15 Gram(s) Oral once PRN Blood Glucose LESS THAN 70 milliGRAM(s)/deciliter  glucagon  Injectable 1 milliGRAM(s) IntraMuscular once PRN Glucose LESS THAN 70 milligrams/deciliter  oxyCODONE    IR 5 milliGRAM(s) Oral every 8 hours PRN Severe Pain (7 - 10)      CAPILLARY BLOOD GLUCOSE      POCT Blood Glucose.: 116 mg/dL (2020 08:14)  POCT Blood Glucose.: 140 mg/dL (2020 21:45)  POCT Blood Glucose.: 90 mg/dL (2020 16:43)  POCT Blood Glucose.: 189 mg/dL (2020 11:47)    I&O's Summary      PHYSICAL EXAM:  Vital Signs Last 24 Hrs  T(C): 36.7 (2020 04:13), Max: 36.9 (2020 14:15)  T(F): 98 (2020 04:13), Max: 98.5 (2020 14:15)  HR: 80 (2020 04:13) (60 - 99)  BP: 154/88 (2020 04:13) (102/49 - 154/88)  BP(mean): --  RR: 16 (2020 04:13) (16 - 28)  SpO2: 98% (2020 04:13) (95% - 98%)    CONSTITUTIONAL: NAD  HEENT: Sclera clear, tracking   RESPIRATORY: Normal respiratory effort; lungs are clear to auscultation bilaterally  CARDIOVASCULAR: Regular rate and rhythm, normal S1 and S2, no murmur/rub/gallop  ABDOMEN: Nontender to palpation, Soft, Non-distended, normoactive bowel sounds, colostomy tube with brown feces, site c/d/i  MUSCLOSKELETAL: No LE edema   NEURO: Alert, good concentration     LABS:                        10.0   11.57 )-----------( 490      ( 2020 05:45 )             28.6     04-04    126<L>  |  91<L>  |  26<H>  ----------------------------<  103<H>  3.5   |  21<L>  |  1.40<H>    Ca    9.0      2020 05:45  Phos  2.7     04-04  Mg     2.0     04-04    TPro  7.4  /  Alb  3.2<L>  /  TBili  0.7  /  DBili  x   /  AST  16  /  ALT  30  /  AlkPhos  162<H>  04-03    PT/INR - ( 2020 16:30 )   PT: 13.1 SEC;   INR: 1.13          PTT - ( 2020 16:30 )  PTT:28.3 SEC      Urinalysis Basic - ( 2020 14:51 )    Color: LIGHT YELLOW / Appearance: CLEAR / S.035 / pH: 6.0  Gluc: NEGATIVE / Ketone: NEGATIVE  / Bili: NEGATIVE / Urobili: NORMAL   Blood: SMALL / Protein: 70 / Nitrite: NEGATIVE   Leuk Esterase: NEGATIVE / RBC: 0-2 / WBC 3-5   Sq Epi: OCC / Non Sq Epi: x / Bacteria: NEGATIVE          RADIOLOGY & ADDITIONAL TESTS:  Results Reviewed:   < from: CT Abdomen and Pelvis w/ IV Cont (20 @ 18:52) >  IMPRESSION:     Profound rectal and distal sigmoid wall edema, masslike at the sigmoid with ill-defined pericolic soft tissue, markedly increased from 2019, suspicious for progression of disease with concern for superimposed proctocolitis. The pericolic soft tissue is inseparable from the bladder dome and bladder wall involvement cannot be excluded. In the low rectum, there is a 2.8 cm ill-defined fluid collection, likely corresponding to the report of purulent mucinous drainage.   Also, new extensive conglomerate and centrally necrotic retroperitoneal lymphadenopathy, reflecting progression of disease. Moderate right hydroureteronephrosis to the level of the necrotic lymphadenopathy.    Trace bilateral pleural effusions with bibasilar subsegmental atelectasis. A new 3 mm right middle lobe subpleural nodule.    < end of copied text >

## 2020-04-04 NOTE — PROGRESS NOTE ADULT - PROBLEM SELECTOR PLAN 2
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  - Plan was for patient to resume chemotherapy on 4/06 with 5FU/Vectibix  - CT here with profound rectal and distal sigmoid wall edema, masslike at the sigmoid with ill-defined pericolic soft tissue, markedly increased from 12/13/2019, suspicious for progression of disease with concern for superimposed proctocolitis. The pericolic soft tissue is inseparable from the bladder dome and bladder wall involvement cannot be excluded. In the low rectum, there is a 2.8 cm ill-defined fluid collection, likely corresponding to the report of purulent mucinous drainage.   Also, new extensive conglomerate and centrally necrotic retroperitoneal lymphadenopathy, reflecting progression of disease.   - Surgery consulted, did not recommend any acute surgical intervention   - Heme/ on consulted, recs appreciated   - ID consulted, recs appreciated. C/w zosyn.   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  - Plan was for patient to resume chemotherapy on 4/06 with 5FU/Vectibix  - CT here with profound rectal and distal sigmoid wall edema, masslike at the sigmoid with ill-defined pericolic soft tissue, markedly increased from 12/13/2019, suspicious for progression of disease with concern for superimposed proctocolitis. The pericolic soft tissue is inseparable from the bladder dome and bladder wall involvement cannot be excluded. In the low rectum, there is a 2.8 cm ill-defined fluid collection, likely corresponding to the report of purulent mucinous drainage.   Also, new extensive conglomerate and centrally necrotic retroperitoneal lymphadenopathy, reflecting progression of disease.   - Surgery consulted, did not recommend any acute surgical intervention   - Heme/ on consulted, recs appreciated   - ID consulted, recs appreciated. C/w zosyn for now (4/3- present), f/u blood cx   - Pain control with tylenol for mild/moderate, oxycodone for severe pain

## 2020-04-04 NOTE — PROGRESS NOTE ADULT - PROBLEM SELECTOR PLAN 3
- will hold off on home Lisinopril 10 mg at this time in setting of likely dehydration   - Monitor blood pressure Scr 1.21 on admission and was 1.06 on March 12th, 2020. Scr 1.4 today. Possibly pre-renal in the setting of decreased PO intake.   - Continue to monitor BMPs   - Avoid nephrotoxins, renally dose meds

## 2020-04-04 NOTE — PROGRESS NOTE ADULT - PROBLEM SELECTOR PLAN 5
Dvt pxx: hep subq   Diet: Regular  Dispo: pending correction of hyponatremia, PT consult pending Patient with pre-diabetes, A1C 5.9 in 03/2020  - ISS and carb consistent diet

## 2020-04-04 NOTE — PROGRESS NOTE ADULT - PROBLEM SELECTOR PLAN 4
Patient with pre-diabetes, A1C 5.9 in 03/2020  - ISS and carb consistent diet - Hold Lisinopril 10 mg in the setting of ROMELIA   - Monitor blood pressure

## 2020-04-04 NOTE — PROGRESS NOTE ADULT - ASSESSMENT
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 Also found to be significantly hyponatremic to 115.

## 2020-04-04 NOTE — PROGRESS NOTE ADULT - PROBLEM SELECTOR PLAN 1
Sodium 118 on admission, repeat Na 115 after NS bolus. Patient with overall weakness, mental status fine, denies dizziness, n/v  - Patient with history of hyponatremia, Na 127-130 on most recent admission in March. Patient was treated with salt tabs at that time.   - Serum osmolality 258  - May be secondary to hypovolemic hyponatremia as patient endorsing decrease po intake. Can also be SIADH in setting of cancer and/or pain. Urine studies c/w SIADH  - C/w fluid restriction and salt tabs   - continue to monitor BMP q 6hrs Sodium 118 on admission, repeat Na 115 after NS bolus. Patient with overall weakness, mental status fine, denies dizziness, n/v.   - Na 126 this AM  - Patient with history of hyponatremia, Na 127-130 on most recent admission in March. Patient was treated with salt tabs at that time.   - Serum osmolality 258  - May be secondary to hypovolemic hyponatremia as patient endorsing decrease po intake. Can also be SIADH in setting of cancer and/or pain.  - C/w fluid restriction and salt tabs 1g BID   - continue to monitor BMP q12hrs. Do not overcorrect.

## 2020-04-04 NOTE — PROGRESS NOTE ADULT - PROBLEM SELECTOR PLAN 6
1.  Name of PCP:   2.  PCP Contacted on Admission: [  ] Yes   3.  PCP contacted at Discharge: [  ] Y    [ ] N    [ ] N/A  4.  Post-Discharge Appointment Date and Location:  5.  Summary of Handoff given to PCP: Dvt pxx: hep subq   Diet: Regular  Dispo: pending correction of hyponatremia, PT consult pending

## 2020-04-05 NOTE — PROGRESS NOTE ADULT - PROBLEM SELECTOR PLAN 2
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  - Plan was for patient to resume chemotherapy on 4/06 with 5FU/Vectibix  - CT here with profound rectal and distal sigmoid wall edema, masslike at the sigmoid with ill-defined pericolic soft tissue, markedly increased from 12/13/2019, suspicious for progression of disease with concern for superimposed proctocolitis. The pericolic soft tissue is inseparable from the bladder dome and bladder wall involvement cannot be excluded. In the low rectum, there is a 2.8 cm ill-defined fluid collection, likely corresponding to the report of purulent mucinous drainage.   Also, new extensive conglomerate and centrally necrotic retroperitoneal lymphadenopathy, reflecting progression of disease.   - Surgery consulted, did not recommend any acute surgical intervention   - Heme/ on consulted, recs appreciated   - ID consulted, recs appreciated. C/w zosyn for now (4/3- present), blood cx NTD. Leukocytosis improving.   - Pain control with tylenol for mild/moderate, oxycodone for severe pain

## 2020-04-05 NOTE — PROGRESS NOTE ADULT - ASSESSMENT
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 Also found to be significantly hyponatremic to 115.

## 2020-04-05 NOTE — CONSULT NOTE ADULT - PROBLEM SELECTOR RECOMMENDATION 9
Patient with hypo-osmolar hyponatremia in the setting of decrease PO intake, increased free water intake, and active malignancy. sNa was 118 on4/2/20 and after isotonic fluids, repeat sNa was 115. sNa increased to 123, and repeat today is 125. Patient currently on 1 gm BID salt tabs and 1.5L fluid restriction. Check urine electrolytes and urine osmolality. Check TSH, AM Cortisol, Uric Acid, and Triglycerides. If uOsms > 300, increase salt tabs to 2 gm TID. Fluid restriction < 1L. Encourage solute intake. Check sNa in the evening. Do not correct serum sodium more than 6-8 mEq in 24 hours.

## 2020-04-05 NOTE — PROGRESS NOTE ADULT - PROBLEM SELECTOR PLAN 3
Scr 1.21 on admission and was 1.06 on March 12th, 2020. Possibly pre-renal in the setting of decreased PO intake. Scr 1.47 this AM (4/5).  - Continue to monitor BMPs   - Avoid nephrotoxins, renally dose meds Scr 1.21 on admission and was 1.06 on March 12th, 2020. Possibly pre-renal in the setting of decreased PO intake. Scr 1.47 this AM (4/5).  - Continue to monitor BMPs   - Avoid nephrotoxins, renally dose meds  - Renal consulted

## 2020-04-05 NOTE — PHYSICAL THERAPY INITIAL EVALUATION ADULT - PLANNED THERAPY INTERVENTIONS, PT EVAL
balance training/strengthening/gait training/bed mobility training/stairs training/transfer training

## 2020-04-05 NOTE — PROGRESS NOTE ADULT - PROBLEM SELECTOR PLAN 1
Sodium 118 on admission, repeat Na 115 after NS bolus. Patient with overall weakness, mental status fine, denies dizziness, n/v.   - Na 124 overnight  - Patient with history of hyponatremia, Na 127-130 on most recent admission in March. Patient was treated with salt tabs at that time.   - Serum osmolality 258  - May be secondary to hypovolemic hyponatremia as patient endorsing decrease po intake. Can also be SIADH in setting of cancer and/or pain.  - C/w fluid restriction and salt tabs 1g BID   - continue to monitor BMP q12hrs. Do not overcorrect. Sodium 118 on admission, repeat Na 115 after NS bolus. Patient with overall weakness, mental status fine, denies dizziness, n/v.   - Na 124 overnight and 125 this AM   - Patient with history of hyponatremia, Na 127-130 on most recent admission in March. Patient was treated with salt tabs at that time.   - Serum osmolality 258  - May be secondary to hypovolemic hyponatremia as patient endorsing decrease po intake. Can also be SIADH in setting of cancer and/or pain.  - C/w fluid restriction and salt tabs 1g BID   - continue to monitor BMP q12hrs. Do not overcorrect.  - Renal consulted Sodium 118 on admission, repeat Na 115 after NS bolus. Patient with overall weakness, mental status fine, denies dizziness, n/v.   - Na 124 overnight and 125 this AM   - Patient with history of hyponatremia, Na 127-130 on most recent admission in March. Patient was treated with salt tabs at that time.   - Serum osmolality 258  - May be secondary to hypovolemic hyponatremia as patient endorsing decrease PO intake. Can also be SIADH in setting of cancer and/or pain.  - C/w fluid restriction and salt tabs 1g BID   - continue to monitor BMP q12hrs. Do not overcorrect.  - Renal consulted

## 2020-04-05 NOTE — PHYSICAL THERAPY INITIAL EVALUATION ADULT - PERTINENT HX OF CURRENT PROBLEM, REHAB EVAL
Patient is 75 year old male admitted with history of  prosteate cancer, s/p resection ( 2000 ), HTN, HLD, presents with worsening rectal discharge and hyponatremia.

## 2020-04-05 NOTE — CONSULT NOTE ADULT - SUBJECTIVE AND OBJECTIVE BOX
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 Nephrology consulted for hyponatremia. As per patient, he has had episodes of hyponatremia in the past and says his sNa has been as low as 120. On review of [facility], history significant of prior episodes of hyponatremia (ranging from 127-130). On 4/1/20 sNa was 122. On arrival to the ER, sNa was 118 (4/2/20). Patient was given isotonic fluids and repeat sNa was 115. sNa increased to 123 and has remained in this range, repeat today is 125. Patient currently on salt tabs 1 gram BID and 1.5L fluid restriction. Of note, patient endorses decreased PO intake and increased water intake during his chemotherapy sessions. Currently denies any complaints including nausea.    PAST HISTORY  --------------------------------------------------------------------------------  PAST MEDICAL & SURGICAL HISTORY:  Diverticulosis  Diabetes mellitus: type 2  Prostate Cancer: denies chemo and radiation  Renal Cancer: denies chemo and radiation  Hyponatremia  Hyperlipidemia  Benign Essential Hypertension  History of chemotherapy: chemo port placement  H/O cystoscopy: 5/2019 dilation of urethra  H/O exploratory laparotomy: lysis of adhesions, biopsy 5/2019  Bowel obstruction: colostomy placement 1/2019  H/O prostatectomy: 2001  H/O partial nephrectomy: left, 2000- benign tumor    FAMILY HISTORY:  FH: ovarian cancer: sister  FH: kidney cancer: brother    PAST SOCIAL HISTORY: lives at home with family, denies any EtOH, smoking, or drugs    ALLERGIES & MEDICATIONS  --------------------------------------------------------------------------------  Allergies    latex (Rash)  No Known Drug Allergies    Intolerances    Standing Inpatient Medications  aspirin enteric coated 81 milliGRAM(s) Oral daily  dextrose 5%. 1000 milliLiter(s) IV Continuous <Continuous>  dextrose 50% Injectable 12.5 Gram(s) IV Push once  dextrose 50% Injectable 25 Gram(s) IV Push once  dextrose 50% Injectable 25 Gram(s) IV Push once  heparin  Injectable 5000 Unit(s) SubCutaneous every 8 hours  insulin lispro (HumaLOG) corrective regimen sliding scale   SubCutaneous three times a day before meals  insulin lispro (HumaLOG) corrective regimen sliding scale   SubCutaneous at bedtime  piperacillin/tazobactam IVPB.. 3.375 Gram(s) IV Intermittent every 8 hours  sodium chloride 1 Gram(s) Oral two times a day    REVIEW OF SYSTEMS  --------------------------------------------------------------------------------  Gen: no lethargy  Respiratory: No dyspnea  CV: No chest pain  GI: No abdominal pain  MSK: no LE edema  Neuro: No dizziness  Heme: No bleeding    All other systems were reviewed and are negative, except as noted.    VITALS/PHYSICAL EXAM  --------------------------------------------------------------------------------  T(C): 36.6 (04-05-20 @ 09:12), Max: 37 (04-05-20 @ 05:56)  HR: 99 (04-05-20 @ 09:12) (90 - 99)  BP: 156/82 (04-05-20 @ 09:12) (143/89 - 156/82)  RR: 18 (04-05-20 @ 09:12) (16 - 18)  SpO2: 98% (04-05-20 @ 09:12) (96% - 99%)  Wt(kg): --    Physical Exam:  	Gen: NAD  	HEENT: MMM  	Pulm: CTA B/L  	CV: S1S2  	Abd: Soft, +BS, ostomy present  	Ext: No LE edema B/L  	Neuro: Awake  	Skin: Warm and dry  	  LABS/STUDIES  --------------------------------------------------------------------------------              10.6   9.19  >-----------<  518      [04-05-20 @ 06:06]              30.9     125  |  91  |  24  ----------------------------<  109      [04-05-20 @ 06:06]  3.4   |  20  |  1.47        Ca     9.2     [04-05-20 @ 06:06]      Mg     2.2     [04-05-20 @ 06:06]      Phos  3.0     [04-05-20 @ 06:06]    Creatinine Trend:  SCr 1.47 [04-05 @ 06:06]  SCr 1.37 [04-04 @ 18:09]  SCr 1.40 [04-04 @ 05:45]  SCr 1.29 [04-03 @ 23:55]  SCr 1.22 [04-03 @ 17:20]    Urinalysis - [04-03-20 @ 14:51]      Color LIGHT YELLOW / Appearance CLEAR / SG 1.035 / pH 6.0      Gluc NEGATIVE / Ketone NEGATIVE  / Bili NEGATIVE / Urobili NORMAL       Blood SMALL / Protein 70 / Leuk Est NEGATIVE / Nitrite NEGATIVE      RBC 0-2 / WBC 3-5 / Hyaline NEGATIVE / Gran  / Sq Epi OCC / Non Sq Epi  / Bacteria NEGATIVE

## 2020-04-06 NOTE — PROGRESS NOTE ADULT - ATTENDING COMMENTS
hyponatremia  ROMELIA
I was physically present for the key portions of the evaluation and management (E/M) service provided.  I agree with the above history, physical, and plan which I have reviewed and edited where appropriate. Plan discussed with team.

## 2020-04-06 NOTE — PROGRESS NOTE ADULT - PROBLEM SELECTOR PLAN 1
Patient with hypo-osmolar hyponatremia in the setting of decrease PO intake, increased free water intake, and active malignancy. sNa was 118 on 4/2/20 and after isotonic fluids, repeat sNa was 115. sNa increased to 123, and repeat yesterday was 125. Urine studies consistent with SIADH. Patient started on NaCl 2 gm TID and 1L fluid restriction, repeat sNa today is 129. Continue salt tabs 2 gm TID and fluid restriction < 1L. Encourage solute intake. Patient can follow up serum sodium with PCP on discharge.  Will sign off patient at this time. Please re-consult if needed.  If any questions, please feel free to contact me  Faheem Small   Nephrology Fellow  [phone]  (After 5 pm or on weekends please page the on-call fellow) Patient with hypo-osmolar hyponatremia in the setting of decrease PO intake, increased free water intake, and active malignancy. sNa was 118 on 4/2/20 and after isotonic fluids, repeat sNa was 115. sNa increased to 123, and repeat yesterday was 125. Urine studies consistent with SIADH. Patient started on NaCl 2 gm TID and 1L fluid restriction, repeat sNa today is 129. Continue salt tabs 2 gm TID and fluid restriction < 1L. Encourage solute intake. Patient can follow up serum sodium with PCP on discharge.

## 2020-04-06 NOTE — PROGRESS NOTE ADULT - PROBLEM SELECTOR PLAN 2
Patient with ROMELIA in the setting of poor PO intake. Patient with ROMELIA in the setting of poor PO intake. Scr increased to 1.44 today from 1.21 on admission. Likely hemodynamically mediated due to poor PO intake (low serum phosphorus). Encourage PO intake.  Will sign off patient at this time. Please re-consult if needed.  If any questions, please feel free to contact me  Faheem Small   Nephrology Fellow  [phone]  (After 5 pm or on weekends please page the on-call fellow)

## 2020-04-06 NOTE — PROGRESS NOTE ADULT - PROBLEM SELECTOR PLAN 6
Dvt pxx: hep subq   Diet: Regular  Dispo: pending correction of hyponatremia Dvt pxx: hep subq   Diet: Regular  Dispo: Home Patient with pre-diabetes, A1C 5.9 in 03/2020  - ISS and carb consistent diet

## 2020-04-06 NOTE — PROGRESS NOTE ADULT - PROBLEM SELECTOR PLAN 5
Patient with pre-diabetes, A1C 5.9 in 03/2020  - ISS and carb consistent diet - Noted on recent labs,   - Unclear if, given age and cancer, if starting statin now has benefit > risk  - Would defer to oncology/PCP regarding starting statin

## 2020-04-06 NOTE — PROGRESS NOTE ADULT - PROBLEM SELECTOR PLAN 2
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  - Plan was for patient to resume chemotherapy on 4/06 with 5FU/Vectibix  - CT here with profound rectal and distal sigmoid wall edema, masslike at the sigmoid with ill-defined pericolic soft tissue, markedly increased from 12/13/2019, suspicious for progression of disease with concern for superimposed proctocolitis. The pericolic soft tissue is inseparable from the bladder dome and bladder wall involvement cannot be excluded. In the low rectum, there is a 2.8 cm ill-defined fluid collection, likely corresponding to the report of purulent mucinous drainage.   Also, new extensive conglomerate and centrally necrotic retroperitoneal lymphadenopathy, reflecting progression of disease.   - Surgery consulted, did not recommend any acute surgical intervention   - Heme/ on consulted, recs appreciated   - ID consulted, recs appreciated. C/w zosyn for now (4/3- present), blood cx NTD. Leukocytosis improving.   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  - Plan was for patient to resume chemotherapy on 4/06 with 5FU/Vectibix  - CT here with profound rectal and distal sigmoid wall edema, masslike at the sigmoid with ill-defined pericolic soft tissue, markedly increased from 12/13/2019, suspicious for progression of disease with concern for superimposed proctocolitis. The pericolic soft tissue is inseparable from the bladder dome and bladder wall involvement cannot be excluded. In the low rectum, there is a 2.8 cm ill-defined fluid collection, likely corresponding to the report of purulent mucinous drainage.   Also, new extensive conglomerate and centrally necrotic retroperitoneal lymphadenopathy, reflecting progression of disease.   - Surgery consulted, did not recommend any acute surgical intervention. Will reach out to surgery today to see if any intervention is indicated considering improving leukocytosis on IV abx.   - Heme/ on consulted, recs appreciated   - ID consulted, recs appreciated. C/w zosyn for now (4/3- present), blood cx NTD. Leukocytosis improving. Per ID, likely an infectious process considering improvement in leukocytosis on IV abx. Will call surgery as above. Okay to d/c on PO Cefatin and flagyl x 2 wks from ID perspective with follow up CT A/P in 2 wks.   - Pain control with tylenol for mild/moderate, oxycodone for severe pain

## 2020-04-06 NOTE — PROGRESS NOTE ADULT - SUBJECTIVE AND OBJECTIVE BOX
CC: F/U for Abscess    Saw/spoke to patient. Patient generally well. No new complaints. Unchanged.    Allergies  latex (Rash)  No Known Drug Allergies    ANTIMICROBIALS:  piperacillin/tazobactam IVPB.. 3.375 every 8 hours    PE:    Vital Signs Last 24 Hrs  T(C): 36.8 (06 Apr 2020 05:17), Max: 37 (05 Apr 2020 21:10)  T(F): 98.3 (06 Apr 2020 05:17), Max: 98.6 (05 Apr 2020 21:10)  HR: 88 (06 Apr 2020 05:17) (88 - 90)  BP: 152/81 (06 Apr 2020 05:17) (151/92 - 152/81)  RR: 19 (06 Apr 2020 05:17) (18 - 19)  SpO2: 95% (06 Apr 2020 05:17) (95% - 98%)    Gen: AOx3, NAD, non-toxic  CV: S1+S2 normal, nontachycardic  Resp: Clear bilat, no resp distress, no crackles/wheezes  Abd: Soft, nontender, +BS  Ext: No LE edema, no wounds    LABS:                        10.5   8.00  )-----------( 525      ( 06 Apr 2020 05:39 )             30.7     04-05    129<L>  |  95<L>  |  22  ----------------------------<  108<H>  4.0   |  20<L>  |  1.44<H>    Ca    9.6      05 Apr 2020 18:30  Phos  2.9     04-05  Mg     2.1     04-05    MICROBIOLOGY:    .Blood Blood-Venous  04-03-20   No growth to date.    (otherwise reviewed)    RADIOLOGY:    4/2 CT:    IMPRESSION:     Profound rectal and distal sigmoid wall edema, masslike at the sigmoid with ill-defined pericolic soft tissue, markedly increased from 12/13/2019, suspicious for progression of disease with concern for superimposed proctocolitis. The pericolic soft tissue is inseparable from the bladder dome and bladder wall involvement cannot be excluded. In the low rectum, there is a 2.8 cm ill-defined fluid collection, likely corresponding to the report of purulent mucinous drainage.   Also, new extensive conglomerate and centrally necrotic retroperitoneal lymphadenopathy, reflecting progression of disease. Moderate right hydroureteronephrosis to the level of the necrotic lymphadenopathy.    Trace bilateral pleural effusions with bibasilar subsegmental atelectasis. A new 3 mm right middle lobe subpleural nodule.

## 2020-04-06 NOTE — PROGRESS NOTE ADULT - ASSESSMENT
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  - When DC planning, would plan to send on Ceftin 500mg q 12 and Flagyl 500mg q 12 for 2 week course; needs repeat CT A/P as outpatient in 10-14 days to determine status infection  2) Malignancy  - Defer timing of chemo to Heme/Onc team, from ID perspective, optimally would complete abx prior to next round chemo (but this depends on risk/benefits, urgency of chemo)  3) Leukocytosis  - F/U BCXs  - Monitor for alternate signs infection    After DC, follow up in ID clinic PRN  Discussed with medicine team    Jass Schmitt MD  Pager [phone]  After 5pm and on weekends call [phone]

## 2020-04-06 NOTE — PROGRESS NOTE ADULT - PROBLEM SELECTOR PLAN 3
Scr 1.21 on admission and was 1.06 on March 12th, 2020. Possibly pre-renal in the setting of decreased PO intake.   - Continue to monitor BMPs   - Avoid nephrotoxins, renally dose meds  - Renal consulted, recs appreciated Scr 1.21 on admission and was 1.06 on March 12th, 2020. Possibly pre-renal in the setting of decreased PO intake.   - Continue to monitor BMPs   - Avoid nephrotoxins, renally dose meds  - Renal consulted, recs appreciated. Encourage PO intake.

## 2020-04-06 NOTE — PROGRESS NOTE ADULT - PROBLEM SELECTOR PLAN 1
Sodium 118 on admission, repeat Na 115 after NS bolus. Patient with overall weakness, mental status fine, denies dizziness, n/v.   - Na 129 overnight  - Patient with history of hyponatremia, Na 127-130 on most recent admission in March. Patient was treated with salt tabs at that time.   - Serum osmolality 258  - May be secondary to hypovolemic hyponatremia as patient endorsing decrease PO intake. Can also be SIADH in setting of cancer and/or pain.  - C/w 1L fluid restriction and 2g TID salt tabs   - continue to monitor BMP q12hrs. Do not overcorrect.  - Renal consulted, recs appreciated Sodium 118 on admission, repeat Na 115 after NS bolus. Patient with overall weakness, mental status fine, denies dizziness, n/v.   - Na 129 overnight  - Patient with history of hyponatremia, Na 127-130 on most recent admission in March. Patient was treated with salt tabs at that time.   - Serum osmolality 258  - May be secondary to hypovolemic hyponatremia as patient endorsing decrease PO intake. Can also be SIADH in setting of cancer and/or pain.  - C/w 1L fluid restriction and 2g TID salt tabs.   - continue to monitor BMPs. Do not overcorrect.  - Renal consulted, recs appreciated. C/w 1L fluid restriction and 2g TID salt tabs. Okay to discharge today on this regimen Sodium 118 on admission, repeat Na 115 after NS bolus. Patient with overall weakness, mental status fine, denies dizziness, n/v.   - Na 129 overnight  - Patient with history of hyponatremia, Na 127-130 on most recent admission in March. Patient was treated with salt tabs at that time.   - Serum osmolality 258  - May be secondary to hypovolemic hyponatremia as patient endorsing decrease PO intake. Can also be SIADH in setting of cancer and/or pain.  - C/w 1L fluid restriction and 2g TID salt tabs.   - continue to monitor BMPs. Do not overcorrect.  - Renal consulted, recs appreciated. C/w 1L fluid restriction and 2g TID salt tabs. Okay to discharge on this regimen

## 2020-04-06 NOTE — PROGRESS NOTE ADULT - PROBLEM SELECTOR PLAN 7
1.  Name of PCP:   2.  PCP Contacted on Admission: [  ] Yes   3.  PCP contacted at Discharge: [  ] Y    [ ] N    [ ] N/A  4.  Post-Discharge Appointment Date and Location:  5.  Summary of Handoff given to PCP: Dvt pxx: hep subq   Diet: Regular  Dispo: Home

## 2020-04-06 NOTE — PROGRESS NOTE ADULT - SUBJECTIVE AND OBJECTIVE BOX
PROGRESS NOTE:   Authored by Aime Tafoya MD, PGY 1, Pager [phone] [facility], [id number] [facility]     Patient is a 75y old  Male who presents with a chief complaint of hyponatremia (05 Apr 2020 14:05)      SUBJECTIVE / OVERNIGHT EVENTS:      MEDICATIONS  (STANDING):  aspirin enteric coated 81 milliGRAM(s) Oral daily  dextrose 5%. 1000 milliLiter(s) (50 mL/Hr) IV Continuous <Continuous>  dextrose 50% Injectable 12.5 Gram(s) IV Push once  dextrose 50% Injectable 25 Gram(s) IV Push once  dextrose 50% Injectable 25 Gram(s) IV Push once  heparin  Injectable 5000 Unit(s) SubCutaneous every 8 hours  insulin lispro (HumaLOG) corrective regimen sliding scale   SubCutaneous three times a day before meals  insulin lispro (HumaLOG) corrective regimen sliding scale   SubCutaneous at bedtime  piperacillin/tazobactam IVPB.. 3.375 Gram(s) IV Intermittent every 8 hours  sodium chloride 2 Gram(s) Oral three times a day    MEDICATIONS  (PRN):  acetaminophen   Tablet .. 650 milliGRAM(s) Oral every 6 hours PRN Mild Pain (1 - 3), Moderate Pain (4 - 6)  dextrose 40% Gel 15 Gram(s) Oral once PRN Blood Glucose LESS THAN 70 milliGRAM(s)/deciliter  glucagon  Injectable 1 milliGRAM(s) IntraMuscular once PRN Glucose LESS THAN 70 milligrams/deciliter  oxyCODONE    IR 5 milliGRAM(s) Oral every 8 hours PRN Severe Pain (7 - 10)      CAPILLARY BLOOD GLUCOSE      POCT Blood Glucose.: 147 mg/dL (05 Apr 2020 22:24)  POCT Blood Glucose.: 109 mg/dL (05 Apr 2020 18:17)  POCT Blood Glucose.: 144 mg/dL (05 Apr 2020 11:45)  POCT Blood Glucose.: 138 mg/dL (05 Apr 2020 08:44)    I&O's Summary    05 Apr 2020 07:01  -  06 Apr 2020 07:00  --------------------------------------------------------  IN: 850 mL / OUT: 450 mL / NET: 400 mL        PHYSICAL EXAM:  Vital Signs Last 24 Hrs  T(C): 36.8 (06 Apr 2020 05:17), Max: 37 (05 Apr 2020 21:10)  T(F): 98.3 (06 Apr 2020 05:17), Max: 98.6 (05 Apr 2020 21:10)  HR: 88 (06 Apr 2020 05:17) (88 - 99)  BP: 152/81 (06 Apr 2020 05:17) (151/92 - 156/82)  BP(mean): --  RR: 19 (06 Apr 2020 05:17) (18 - 19)  SpO2: 95% (06 Apr 2020 05:17) (95% - 98%)    CONSTITUTIONAL: NAD  RESPIRATORY: Normal respiratory effort; lungs are clear to auscultation bilaterally  CARDIOVASCULAR: Regular rate and rhythm, normal S1 and S2, no murmur/rub/gallop  ABDOMEN: Nontender to palpation, Soft, Non-distended, normoactive bowel sounds,   MUSCLOSKELETAL: No LE edema   NEURO: Alert, good concentration     LABS:                        10.5   8.00  )-----------( 525      ( 06 Apr 2020 05:39 )             30.7     04-05    129<L>  |  95<L>  |  22  ----------------------------<  108<H>  4.0   |  20<L>  |  1.44<H>    Ca    9.6      05 Apr 2020 18:30  Phos  2.9     04-05  Mg     2.1     04-05                Culture - Blood (collected 03 Apr 2020 23:24)  Source: .Blood Blood  Preliminary Report (05 Apr 2020 01:02):    No growth to date.    Culture - Blood (collected 03 Apr 2020 23:24)  Source: .Blood Blood-Venous  Preliminary Report (05 Apr 2020 01:02):    No growth to date.        RADIOLOGY & ADDITIONAL TESTS:  Results Reviewed. PROGRESS NOTE:   Authored by Aime Tafoya MD, PGY 1, Pager [phone] [facility], [id number] [facility]     Patient is a 75y old  Male who presents with a chief complaint of hyponatremia (05 Apr 2020 14:05)      SUBJECTIVE / OVERNIGHT EVENTS: Pt seen and examined at bedside this AM. Denies chest pain, SOB, abd pain, nausea or vomiting. Pt continues to have mucus per rectum.       MEDICATIONS  (STANDING):  aspirin enteric coated 81 milliGRAM(s) Oral daily  dextrose 5%. 1000 milliLiter(s) (50 mL/Hr) IV Continuous <Continuous>  dextrose 50% Injectable 12.5 Gram(s) IV Push once  dextrose 50% Injectable 25 Gram(s) IV Push once  dextrose 50% Injectable 25 Gram(s) IV Push once  heparin  Injectable 5000 Unit(s) SubCutaneous every 8 hours  insulin lispro (HumaLOG) corrective regimen sliding scale   SubCutaneous three times a day before meals  insulin lispro (HumaLOG) corrective regimen sliding scale   SubCutaneous at bedtime  piperacillin/tazobactam IVPB.. 3.375 Gram(s) IV Intermittent every 8 hours  sodium chloride 2 Gram(s) Oral three times a day    MEDICATIONS  (PRN):  acetaminophen   Tablet .. 650 milliGRAM(s) Oral every 6 hours PRN Mild Pain (1 - 3), Moderate Pain (4 - 6)  dextrose 40% Gel 15 Gram(s) Oral once PRN Blood Glucose LESS THAN 70 milliGRAM(s)/deciliter  glucagon  Injectable 1 milliGRAM(s) IntraMuscular once PRN Glucose LESS THAN 70 milligrams/deciliter  oxyCODONE    IR 5 milliGRAM(s) Oral every 8 hours PRN Severe Pain (7 - 10)      CAPILLARY BLOOD GLUCOSE      POCT Blood Glucose.: 147 mg/dL (05 Apr 2020 22:24)  POCT Blood Glucose.: 109 mg/dL (05 Apr 2020 18:17)  POCT Blood Glucose.: 144 mg/dL (05 Apr 2020 11:45)  POCT Blood Glucose.: 138 mg/dL (05 Apr 2020 08:44)    I&O's Summary    05 Apr 2020 07:01  -  06 Apr 2020 07:00  --------------------------------------------------------  IN: 850 mL / OUT: 450 mL / NET: 400 mL        PHYSICAL EXAM:  Vital Signs Last 24 Hrs  T(C): 36.8 (06 Apr 2020 05:17), Max: 37 (05 Apr 2020 21:10)  T(F): 98.3 (06 Apr 2020 05:17), Max: 98.6 (05 Apr 2020 21:10)  HR: 88 (06 Apr 2020 05:17) (88 - 99)  BP: 152/81 (06 Apr 2020 05:17) (151/92 - 156/82)  BP(mean): --  RR: 19 (06 Apr 2020 05:17) (18 - 19)  SpO2: 95% (06 Apr 2020 05:17) (95% - 98%)    CONSTITUTIONAL: NAD  HEENT: Sclera clear, tracking   RESPIRATORY: Normal respiratory effort; lungs are clear to auscultation bilaterally  CARDIOVASCULAR: Regular rate and rhythm, normal S1 and S2, no murmur/rub/gallop  ABDOMEN: Nontender to palpation, Soft, Non-distended, normoactive bowel sounds, colostomy tube with brown feces, site c/d/i  MUSCLOSKELETAL: No LE edema   NEURO: Alert, good concentration     LABS:                        10.5   8.00  )-----------( 525      ( 06 Apr 2020 05:39 )             30.7     04-05    129<L>  |  95<L>  |  22  ----------------------------<  108<H>  4.0   |  20<L>  |  1.44<H>    Ca    9.6      05 Apr 2020 18:30  Phos  2.9     04-05  Mg     2.1     04-05                Culture - Blood (collected 03 Apr 2020 23:24)  Source: .Blood Blood  Preliminary Report (05 Apr 2020 01:02):    No growth to date.    Culture - Blood (collected 03 Apr 2020 23:24)  Source: .Blood Blood-Venous  Preliminary Report (05 Apr 2020 01:02):    No growth to date.        RADIOLOGY & ADDITIONAL TESTS:  Results Reviewed.

## 2020-04-06 NOTE — PROGRESS NOTE ADULT - SUBJECTIVE AND OBJECTIVE BOX
[facility] DIVISION OF KIDNEY DISEASES AND HYPERTENSION -- FOLLOW UP NOTE  --------------------------------------------------------------------------------  HPI: 76 y/o Male, PMH of Prostate Cancer s/p resection (2000), left renal cancer (s/p L partial nephrectomy 2000), HLD, HTN, stage IV rectosigmoid cancer presented to the ER complaining of worsening rectal discharge and decreased PO intake. Nephrology consulted for hyponatremia. On review of [facility], history significant of prior episodes of hyponatremia (ranging from 127-130). On 4/1/20 sNa was 122. On arrival to the ER, sNa was 118 (4/2/20). Patient was given isotonic fluids and repeat sNa was 115. sNa increased to 123 and has remained in that range, repeat yesterday was 125. Patient started on 2 grams salt tabs TID and 1L fluid restriction, repeat sNa today is 129.    PAST HISTORY  --------------------------------------------------------------------------------  No significant changes to PMH, PSH, FHx, SHx, unless otherwise noted    ALLERGIES & MEDICATIONS  --------------------------------------------------------------------------------  Allergies    latex (Rash)  No Known Drug Allergies    Intolerances    Standing Inpatient Medications  aspirin enteric coated 81 milliGRAM(s) Oral daily  dextrose 5%. 1000 milliLiter(s) IV Continuous <Continuous>  dextrose 50% Injectable 12.5 Gram(s) IV Push once  dextrose 50% Injectable 25 Gram(s) IV Push once  dextrose 50% Injectable 25 Gram(s) IV Push once  heparin  Injectable 5000 Unit(s) SubCutaneous every 8 hours  insulin lispro (HumaLOG) corrective regimen sliding scale   SubCutaneous three times a day before meals  insulin lispro (HumaLOG) corrective regimen sliding scale   SubCutaneous at bedtime  piperacillin/tazobactam IVPB.. 3.375 Gram(s) IV Intermittent every 8 hours  sodium chloride 2 Gram(s) Oral three times a day    REVIEW OF SYSTEMS  --------------------------------------------------------------------------------  Gen: no lethargy  Respiratory: No dyspnea  CV: No chest pain  GI: No abdominal pain  MSK: no LE edema  Neuro: No dizziness  Heme: No bleeding    All other systems were reviewed and are negative, except as noted.    VITALS/PHYSICAL EXAM  --------------------------------------------------------------------------------  T(C): 36.8 (04-06-20 @ 05:17), Max: 37 (04-05-20 @ 21:10)  HR: 88 (04-06-20 @ 05:17) (88 - 90)  BP: 152/81 (04-06-20 @ 05:17) (151/92 - 152/81)  RR: 19 (04-06-20 @ 05:17) (18 - 19)  SpO2: 95% (04-06-20 @ 05:17) (95% - 98%)  Wt(kg): --    04-05-20 @ 07:01  -  04-06-20 @ 07:00  --------------------------------------------------------  IN: 850 mL / OUT: 450 mL / NET: 400 mL    Physical Exam:  	Gen: NAD  	HEENT: MMM  	Pulm: CTA B/L  	CV: S1S2  	Abd: Soft, +BS, ostomy present  	Ext: No LE edema B/L  	Neuro: Awake  	Skin: Warm and dry    LABS/STUDIES  --------------------------------------------------------------------------------              10.5   8.00  >-----------<  525      [04-06-20 @ 05:39]              30.7     129  |  95  |  22  ----------------------------<  108      [04-05-20 @ 18:30]  4.0   |  20  |  1.44        Ca     9.6     [04-05-20 @ 18:30]      Mg     2.1     [04-05-20 @ 18:30]      Phos  2.9     [04-05-20 @ 18:30]    Uric acid 5.1      [04-06-20 @ 05:39]    Creatinine Trend:  SCr 1.44 [04-05 @ 18:30]  SCr 1.47 [04-05 @ 06:06]  SCr 1.37 [04-04 @ 18:09]  SCr 1.40 [04-04 @ 05:45]  SCr 1.29 [04-03 @ 23:55]    Urinalysis - [04-03-20 @ 14:51]      Color LIGHT YELLOW / Appearance CLEAR / SG 1.035 / pH 6.0      Gluc NEGATIVE / Ketone NEGATIVE  / Bili NEGATIVE / Urobili NORMAL       Blood SMALL / Protein 70 / Leuk Est NEGATIVE / Nitrite NEGATIVE      RBC 0-2 / WBC 3-5 / Hyaline NEGATIVE / Gran  / Sq Epi OCC / Non Sq Epi  / Bacteria NEGATIVE    Urine Creatinine 68.50      [04-05-20 @ 14:20]  Urine Sodium 49      [04-05-20 @ 14:20]  Urine Potassium 13.5      [04-05-20 @ 14:20]  Urine Chloride 27      [04-05-20 @ 14:20]  Urine Osmolality 378      [04-05-20 @ 14:20]

## 2020-04-07 NOTE — PROGRESS NOTE ADULT - PROBLEM SELECTOR PLAN 1
Sodium 118 on admission, repeat Na 115 after NS bolus. Patient with overall weakness, mental status fine, denies dizziness, n/v. Improving.   - Patient with history of hyponatremia, Na 127-130 on most recent admission in March. Patient was treated with salt tabs at that time.   - Serum osmolality 258  - May be secondary to hypovolemic hyponatremia as patient endorsing decrease PO intake. Can also be SIADH in setting of cancer and/or pain.  - C/w 1L fluid restriction and 2g TID salt tabs.   - continue to monitor BMPs. Do not overcorrect.  - Renal consulted, recs appreciated. C/w 1L fluid restriction and 2g TID salt tabs. Okay to discharge on this regimen Sodium 118 on admission, repeat Na 115 after NS bolus. Patient with overall weakness, mental status fine, denies dizziness, n/v. Improving.   - Patient with history of hyponatremia, Na 127-130 on most recent admission in March. Patient was treated with salt tabs at that time.   - Serum osmolality 258  - May be secondary to hypovolemic hyponatremia as patient endorsing decrease PO intake. Can also be SIADH in setting of cancer and/or pain.  - C/w 1L fluid restriction and 2g TID salt tabs.   - continue to monitor BMPs. Do not overcorrect.  - Renal consulted, recs appreciated. C/w 1L fluid restriction and 2g TID salt tabs. Okay to discharge on this regimen. Pt will need follow up BMP as an out pt.

## 2020-04-07 NOTE — DISCHARGE NOTE PROVIDER - NSDCCPCAREPLAN_GEN_ALL_CORE_FT
PRINCIPAL DISCHARGE DIAGNOSIS  Diagnosis: Proctocolitis  Assessment and Plan of Treatment: You came to the hospital with mucus per your rectum. You had a CT scan of your abdomen and pelvis which showed a possible infection. You were treated with IV antibiotics. Please take your antibiotics as prescribed. You will need a repeat CT scan of your abdomen and pelvis in 2 weeks. Please follow up with your oncologist and/or infectious disease physician after discharge.      SECONDARY DISCHARGE DIAGNOSES  Diagnosis: Hyponatremia  Assessment and Plan of Treatment: When you came to the hosptial your blood sodium was low. You were seen by a kidney doctor (nephrologist) who reccomended that you restrict your fluid intake to 1L per day and take salt tabs. Please continue to take your salt tabs as prescribed and limit your fluid intake to 1L per day. Please follow up with your primary care provider and/ or oncologist this week to monitor your serum sodium levels.    Diagnosis: Hyperlipidemia, unspecified hyperlipidemia type  Assessment and Plan of Treatment: You were found to have high lipid levels in your blood. Please follow up with your primary care provider and inquire if a medication to reduce your blood lipid levels is indicated. PRINCIPAL DISCHARGE DIAGNOSIS  Diagnosis: Proctocolitis  Assessment and Plan of Treatment: You came to the hospital with mucus per your rectum. You had a CT scan of your abdomen and pelvis which showed a possible infection. You were treated with IV antibiotics. Please take your antibiotics as prescribed. You will need a repeat CT scan of your abdomen and pelvis in 2 weeks. Please follow up with your oncologist and/or infectious disease physician after discharge.      SECONDARY DISCHARGE DIAGNOSES  Diagnosis: Hyponatremia  Assessment and Plan of Treatment: When you came to the hosptial your blood sodium was low. You were seen by a kidney doctor (nephrologist) who reccomended that you restrict your fluid intake to 1L per day and take salt tabs. Please continue to take your salt tabs as prescribed and limit your fluid intake to 1L per day. Please follow up with your primary care provider and/ or oncologist this week to monitor your serum sodium levels and to determine if fluid restriction and salt tabs are still required.    Diagnosis: Hyperlipidemia, unspecified hyperlipidemia type  Assessment and Plan of Treatment: You were found to have high lipid levels in your blood. Please follow up with your primary care provider and inquire if a medication to reduce your blood lipid levels is indicated.    Diagnosis: Hypertension  Assessment and Plan of Treatment: [facility] kidney [facility] (transient damage to your kidneys). Please follow up with your primary care provider to determine when it is safe to resume this medication. PRINCIPAL DISCHARGE DIAGNOSIS  Diagnosis: Proctocolitis  Assessment and Plan of Treatment: You came to the hospital with mucus per your rectum. You had a CT scan of your abdomen and pelvis which showed a possible infection. You were treated with IV antibiotics. Please take your antibiotics as prescribed. You will need a repeat CT scan of your abdomen and pelvis in 2 weeks. Please follow up with your oncologist and/or infectious disease physician after discharge.      SECONDARY DISCHARGE DIAGNOSES  Diagnosis: Hyponatremia  Assessment and Plan of Treatment: When you came to the hosptial your blood sodium was low. You were seen by a kidney doctor (nephrologist) who reccomended that you restrict your fluid intake to 1L per day and take salt tabs. Please continue to take your salt tabs as prescribed and limit your fluid intake to 1L per day. It is critical that you follow up with your primary care provider this week to monitor your serum sodium levels and to determine if fluid restriction and salt tabs are still required.    Diagnosis: Hyperlipidemia, unspecified hyperlipidemia type  Assessment and Plan of Treatment: You were found to have high lipid levels in your blood. Please follow up with your primary care provider and inquire if a medication to reduce your blood lipid levels is indicated.    Diagnosis: Hypertension  Assessment and Plan of Treatment: [facility] (transient damage to your kidneys). Please follow up with your primary care provider to determine when it is safe to resume this medication. PRINCIPAL DISCHARGE DIAGNOSIS  Diagnosis: Proctocolitis  Assessment and Plan of Treatment: You came to the hospital with mucus per your rectum. You had a CT scan of your abdomen and pelvis which showed a possible infection. You were treated with IV antibiotics. Please take your antibiotics as prescribed. You will need a repeat CT scan of your abdomen and pelvis in 2 weeks. Please follow up with your oncologist and/or infectious disease physician after discharge. Please call [phone] to schedule a follow up appointment with an infectious disease physician.      SECONDARY DISCHARGE DIAGNOSES  Diagnosis: Hyponatremia  Assessment and Plan of Treatment: When you came to the hosptial your blood sodium was low. You were seen by a kidney doctor (nephrologist) who reccomended that you restrict your fluid intake to 1L per day and take salt tabs. Please continue to take your salt tabs as prescribed and limit your fluid intake to 1L per day. It is critical that you follow up with your primary care provider this week to monitor your serum sodium levels and to determine if fluid restriction and salt tabs are still required.    Diagnosis: Hyperlipidemia, unspecified hyperlipidemia type  Assessment and Plan of Treatment: You were found to have high lipid levels in your blood. Please follow up with your primary care provider and inquire if a medication to reduce your blood lipid levels is indicated.    Diagnosis: Hypertension  Assessment and Plan of Treatment: Your lisinopril was held while you were in the [facility] you developed an acute kidney injury (transient damage to your kidneys). Please follow up with your primary care provider to determine when it is safe to resume this medication. PRINCIPAL DISCHARGE DIAGNOSIS  Diagnosis: Proctocolitis  Assessment and Plan of Treatment: You came to the hospital with mucus per your rectum. You had a CT scan of your abdomen and pelvis which showed a possible infection. You were treated with IV antibiotics. Please take your antibiotics as prescribed. You will need a repeat CT scan of your abdomen and pelvis in 2 weeks. Please follow up with your infectious disease physician (Dr. Schmitt) after discharge. Please call [phone] to schedule a follow up appointment with an infectious disease physician.      SECONDARY DISCHARGE DIAGNOSES  Diagnosis: Hyponatremia  Assessment and Plan of Treatment: When you came to the hosptial your blood sodium was low. You were seen by a kidney doctor (nephrologist) who reccomended that you restrict your fluid intake to 1L per day and take salt tabs. Please continue to take your salt tabs as prescribed and limit your fluid intake to 1L per day. It is critical that you follow up with your primary care provider this week to monitor your serum sodium levels and to determine if fluid restriction and salt tabs are still required.    Diagnosis: Hyperlipidemia, unspecified hyperlipidemia type  Assessment and Plan of Treatment: You were found to have high lipid levels in your blood. Please follow up with your primary care provider and inquire if a medication to reduce your blood lipid levels is indicated.    Diagnosis: Hypertension  Assessment and Plan of Treatment: Your lisinopril was held while you were in the [facility] you developed an acute kidney injury (transient damage to your kidneys). Please follow up with your primary care provider to determine when it is safe to resume this medication.

## 2020-04-07 NOTE — PROGRESS NOTE ADULT - SUBJECTIVE AND OBJECTIVE BOX
PROGRESS NOTE:   Authored by Aime Tafoya MD, PGY 1, Pager [phone] [facility], [id number] [facility]     Patient is a 75y old  Male who presents with a chief complaint of hyponatremia (06 Apr 2020 13:36)      SUBJECTIVE / OVERNIGHT EVENTS:      MEDICATIONS  (STANDING):  aspirin enteric coated 81 milliGRAM(s) Oral daily  dextrose 5%. 1000 milliLiter(s) (50 mL/Hr) IV Continuous <Continuous>  dextrose 50% Injectable 12.5 Gram(s) IV Push once  dextrose 50% Injectable 25 Gram(s) IV Push once  dextrose 50% Injectable 25 Gram(s) IV Push once  heparin  Injectable 5000 Unit(s) SubCutaneous every 8 hours  insulin lispro (HumaLOG) corrective regimen sliding scale   SubCutaneous three times a day before meals  insulin lispro (HumaLOG) corrective regimen sliding scale   SubCutaneous at bedtime  piperacillin/tazobactam IVPB.. 3.375 Gram(s) IV Intermittent every 8 hours  sodium chloride 2 Gram(s) Oral three times a day    MEDICATIONS  (PRN):  acetaminophen   Tablet .. 650 milliGRAM(s) Oral every 6 hours PRN Mild Pain (1 - 3), Moderate Pain (4 - 6)  dextrose 40% Gel 15 Gram(s) Oral once PRN Blood Glucose LESS THAN 70 milliGRAM(s)/deciliter  glucagon  Injectable 1 milliGRAM(s) IntraMuscular once PRN Glucose LESS THAN 70 milligrams/deciliter  oxyCODONE    IR 5 milliGRAM(s) Oral every 8 hours PRN Severe Pain (7 - 10)      CAPILLARY BLOOD GLUCOSE      POCT Blood Glucose.: 96 mg/dL (07 Apr 2020 00:24)  POCT Blood Glucose.: 103 mg/dL (06 Apr 2020 17:06)  POCT Blood Glucose.: 125 mg/dL (06 Apr 2020 11:54)  POCT Blood Glucose.: 103 mg/dL (06 Apr 2020 08:22)    I&O's Summary      PHYSICAL EXAM:  Vital Signs Last 24 Hrs  T(C): 36.7 (07 Apr 2020 06:29), Max: 36.8 (06 Apr 2020 15:36)  T(F): 98 (07 Apr 2020 06:29), Max: 98.2 (06 Apr 2020 15:36)  HR: 88 (07 Apr 2020 06:29) (80 - 90)  BP: 154/90 (07 Apr 2020 06:29) (147/83 - 154/90)  BP(mean): --  RR: 19 (07 Apr 2020 06:29) (16 - 19)  SpO2: 96% (07 Apr 2020 06:29) (96% - 99%)    CONSTITUTIONAL: NAD  RESPIRATORY: Normal respiratory effort; lungs are clear to auscultation bilaterally  CARDIOVASCULAR: Regular rate and rhythm, normal S1 and S2, no murmur/rub/gallop  ABDOMEN: Nontender to palpation, Soft, Non-distended, normoactive bowel sounds,   MUSCLOSKELETAL: No LE edema   NEURO: Alert, good concentration     LABS:                        10.5   8.00  )-----------( 525      ( 06 Apr 2020 05:39 )             30.7     04-06    130<L>  |  97<L>  |  23  ----------------------------<  125<H>  4.2   |  15<L>  |  1.37<H>    Ca    9.4      06 Apr 2020 13:00  Phos  3.3     04-06  Mg     2.3     04-06                  RADIOLOGY & ADDITIONAL TESTS:  Results Reviewed:     < from: CT Abdomen and Pelvis w/ IV Cont (04.02.20 @ 18:52) >  IMPRESSION:     Profound rectal and distal sigmoid wall edema, masslike at the sigmoid with ill-defined pericolic soft tissue, markedly increased from 12/13/2019, suspicious for progression of disease with concern for superimposed proctocolitis. The pericolic soft tissue is inseparable from the bladder dome and bladder wall involvement cannot be excluded. In the low rectum, there is a 2.8 cm ill-defined fluid collection, likely corresponding to the report of purulent mucinous drainage.   Also, new extensive conglomerate and centrally necrotic retroperitoneal lymphadenopathy, reflecting progression of disease. Moderate right hydroureteronephrosis to the level of the necrotic lymphadenopathy.    Trace bilateral pleural effusions with bibasilar subsegmental atelectasis. A new 3 mm right middle lobe subpleural nodule.      Findings were discussed with Dr. Goldberger 4/2/2020 7:22 PM by Dr. Fonseca with repeat back confirmation.    < end of copied text > PROGRESS NOTE:   Authored by Aime Tafoya MD, PGY 1, Pager [phone] [facility], [id number] [facility]     Patient is a 75y old  Male who presents with a chief complaint of hyponatremia (06 Apr 2020 13:36)      SUBJECTIVE / OVERNIGHT EVENTS: Pt seen and examined at bedside this AM. Pt denies chest pain, shortness of breath, abd pain, nausea or vomiting.       MEDICATIONS  (STANDING):  aspirin enteric coated 81 milliGRAM(s) Oral daily  dextrose 5%. 1000 milliLiter(s) (50 mL/Hr) IV Continuous <Continuous>  dextrose 50% Injectable 12.5 Gram(s) IV Push once  dextrose 50% Injectable 25 Gram(s) IV Push once  dextrose 50% Injectable 25 Gram(s) IV Push once  heparin  Injectable 5000 Unit(s) SubCutaneous every 8 hours  insulin lispro (HumaLOG) corrective regimen sliding scale   SubCutaneous three times a day before meals  insulin lispro (HumaLOG) corrective regimen sliding scale   SubCutaneous at bedtime  piperacillin/tazobactam IVPB.. 3.375 Gram(s) IV Intermittent every 8 hours  sodium chloride 2 Gram(s) Oral three times a day    MEDICATIONS  (PRN):  acetaminophen   Tablet .. 650 milliGRAM(s) Oral every 6 hours PRN Mild Pain (1 - 3), Moderate Pain (4 - 6)  dextrose 40% Gel 15 Gram(s) Oral once PRN Blood Glucose LESS THAN 70 milliGRAM(s)/deciliter  glucagon  Injectable 1 milliGRAM(s) IntraMuscular once PRN Glucose LESS THAN 70 milligrams/deciliter  oxyCODONE    IR 5 milliGRAM(s) Oral every 8 hours PRN Severe Pain (7 - 10)      CAPILLARY BLOOD GLUCOSE      POCT Blood Glucose.: 96 mg/dL (07 Apr 2020 00:24)  POCT Blood Glucose.: 103 mg/dL (06 Apr 2020 17:06)  POCT Blood Glucose.: 125 mg/dL (06 Apr 2020 11:54)  POCT Blood Glucose.: 103 mg/dL (06 Apr 2020 08:22)    I&O's Summary      PHYSICAL EXAM:  Vital Signs Last 24 Hrs  T(C): 36.7 (07 Apr 2020 06:29), Max: 36.8 (06 Apr 2020 15:36)  T(F): 98 (07 Apr 2020 06:29), Max: 98.2 (06 Apr 2020 15:36)  HR: 88 (07 Apr 2020 06:29) (80 - 90)  BP: 154/90 (07 Apr 2020 06:29) (147/83 - 154/90)  BP(mean): --  RR: 19 (07 Apr 2020 06:29) (16 - 19)  SpO2: 96% (07 Apr 2020 06:29) (96% - 99%)    CONSTITUTIONAL: NAD  HEENT: Sclera clear, tracking   RESPIRATORY: Normal respiratory effort; lungs are clear to auscultation bilaterally  CARDIOVASCULAR: Regular rate and rhythm, normal S1 and S2, no murmur/rub/gallop  ABDOMEN: Nontender to palpation, Soft, Non-distended, normoactive bowel sounds, colostomy tube with brown feces, site c/d/i  MUSCLOSKELETAL: No LE edema   NEURO: Alert, good concentration     LABS:                        10.5   8.00  )-----------( 525      ( 06 Apr 2020 05:39 )             30.7     04-06    130<L>  |  97<L>  |  23  ----------------------------<  125<H>  4.2   |  15<L>  |  1.37<H>    Ca    9.4      06 Apr 2020 13:00  Phos  3.3     04-06  Mg     2.3     04-06                  RADIOLOGY & ADDITIONAL TESTS:  Results Reviewed:     < from: CT Abdomen and Pelvis w/ IV Cont (04.02.20 @ 18:52) >  IMPRESSION:     Profound rectal and distal sigmoid wall edema, masslike at the sigmoid with ill-defined pericolic soft tissue, markedly increased from 12/13/2019, suspicious for progression of disease with concern for superimposed proctocolitis. The pericolic soft tissue is inseparable from the bladder dome and bladder wall involvement cannot be excluded. In the low rectum, there is a 2.8 cm ill-defined fluid collection, likely corresponding to the report of purulent mucinous drainage.   Also, new extensive conglomerate and centrally necrotic retroperitoneal lymphadenopathy, reflecting progression of disease. Moderate right hydroureteronephrosis to the level of the necrotic lymphadenopathy.    Trace bilateral pleural effusions with bibasilar subsegmental atelectasis. A new 3 mm right middle lobe subpleural nodule.      Findings were discussed with Dr. Goldberger 4/2/2020 7:22 PM by Dr. Fonseca with repeat back confirmation.    < end of copied text >

## 2020-04-07 NOTE — DISCHARGE NOTE PROVIDER - NSDCFUSCHEDAPPT_GEN_ALL_CORE_FT
EDEN SOLIS ; 04/13/2020 ; NPP Anat CC Infusion  EDEN SOLIS ; 04/27/2020 ; NPP Anat CC Infusion  EDEN SOLIS ; 05/11/2020 ; NPP Anat CC Infusion  EDEN SOLIS ; 05/12/2020 ; NPP Surg [place] [facility]  EDEN SOLIS ; 05/26/2020 ; NPP Anat CC Infusion EDEN SOLIS ; 04/13/2020 ; NPP Anat CC Infusion  EDEN SOLIS ; 04/27/2020 ; NPP Anat CC Infusion  EDEN SOLIS ; 05/11/2020 ; NPP Anat CC Infusion  EDEN SOLIS ; 05/12/2020 ; NPP Surg [place] [facility]  EDEN SOLIS ; 05/26/2020 ; NPP Anat CC Infusion EDEN SOLIS ; 04/13/2020 ; NPP Anat CC Infusion  EDEN SOLIS ; 04/27/2020 ; NPP Anat CC Infusion  EDEN SOLIS ; 05/11/2020 ; NPP Anat CC Infusion  EDEN SOLIS ; 05/12/2020 ; NPP Surg [place] [facility]  EDEN SOLIS ; 05/26/2020 ; NPP Anat CC Infusion EDEN SOLIS ; 04/13/2020 ; NPP Anat CC Infusion  EDEN SOLIS ; 04/27/2020 ; NPP Anat CC Infusion  EDEN SOLIS ; 05/11/2020 ; NPP Anat CC Infusion  EDEN SOLIS ; 05/12/2020 ; NPP Surg [place] [facility]  EDEN SOLIS ; 05/26/2020 ; NPP Anat CC Infusion EDEN SOLIS ; 04/27/2020 ; NPP Anat CC Infusion  EDEN SOLIS ; 05/11/2020 ; NPP Anat CC Infusion  EDEN SOLIS ; 05/12/2020 ; NPP Surg [place] [facility]  EDEN SOLIS ; 05/26/2020 ; NPP Anat CC Infusion EDEN SOLIS ; 04/27/2020 ; NPP Anat CC Infusion  EDEN SOLIS ; 05/11/2020 ; NPP Anat CC Infusion  EDEN SOLIS ; 05/12/2020 ; NPP Surg [place] [facility]  EDEN SOLIS ; 05/26/2020 ; NPP Anat CC Infusion EDEN SOLIS ; 04/27/2020 ; NPP Anat CC Infusion  EDEN SOLIS ; 05/11/2020 ; NPP Anat CC Infusion  EDEN SOLIS ; 05/12/2020 ; NPP Surg [place] [facility]  EDEN SOLIS ; 05/26/2020 ; NPP Anat CC Infusion EDEN SOLIS ; 04/27/2020 ; NPP Anat CC Infusion  EDEN SOLIS ; 05/11/2020 ; NPP Anat CC Infusion  EDEN SOLIS ; 05/12/2020 ; NPP Surg [place] [facility]  EDEN SOLIS ; 05/26/2020 ; NPP Anat CC Infusion

## 2020-04-07 NOTE — DISCHARGE NOTE PROVIDER - CARE PROVIDER_API CALL
Joel Villalobos)  Hematology; Medical Oncology  [address] [place] [address]  Phone: [phone]  Fax: [phone]  Established Patient  Follow Up Time: 1 week    Jass Schmitt)  Infectious Disease; Internal Medicine  [address] [place] [id number]  Phone: [phone]  Fax: [phone]  Follow Up Time: 2 weeks

## 2020-04-07 NOTE — DISCHARGE NOTE PROVIDER - PROVIDER TOKENS
PROVIDER:[TOKEN:[[id number]],FOLLOWUP:[1 week],ESTABLISHEDPATIENT:[T]],PROVIDER:[TOKEN:[[id number]],FOLLOWUP:[2 weeks]]

## 2020-04-07 NOTE — PROGRESS NOTE ADULT - PROBLEM SELECTOR PLAN 5
- Noted on recent labs,   - Unclear if, given age and cancer, if starting statin now has benefit > risk  - Would defer to oncology/PCP regarding starting statin

## 2020-04-07 NOTE — DISCHARGE NOTE PROVIDER - CARE PROVIDERS DIRECT ADDRESSES
[email or web address]

## 2020-04-07 NOTE — DISCHARGE NOTE NURSING/CASE MANAGEMENT/SOCIAL WORK - PATIENT PORTAL LINK FT
You can access the FollowMyHealth Patient Portal offered by [facility] by registering at the following website: [email or web address]. By joining [employer]’s FollowMyHealth portal, you will also be able to view your health information using other applications (apps) compatible with our system.

## 2020-04-07 NOTE — PROGRESS NOTE ADULT - PROBLEM SELECTOR PLAN 2
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  - Plan was for patient to resume chemotherapy on 4/06 with 5FU/Vectibix  - CT here with profound rectal and distal sigmoid wall edema, masslike at the sigmoid with ill-defined pericolic soft tissue, markedly increased from 12/13/2019, suspicious for progression of disease with concern for superimposed proctocolitis. The pericolic soft tissue is inseparable from the bladder dome and bladder wall involvement cannot be excluded. In the low rectum, there is a 2.8 cm ill-defined fluid collection, likely corresponding to the report of purulent mucinous drainage.   Also, new extensive conglomerate and centrally necrotic retroperitoneal lymphadenopathy, reflecting progression of disease.   - Surgery consulted, did not recommend any acute surgical intervention. Per repeat d/w surgery on 4/6 no acute surgical intervention   - Heme/ on consulted, recs appreciated   - ID consulted, recs appreciated. C/w zosyn for now (4/3- present), blood cx NTD. Leukocytosis improving. Per ID, likely an infectious process considering improvement in leukocytosis on IV abx. Okay to d/c on PO Cefatin and flagyl x 2 wks from ID perspective with follow up CT A/P in 2 wks.   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  - Plan was for patient to resume chemotherapy on 4/06 with 5FU/Vectibix  - CT here with profound rectal and distal sigmoid wall edema, masslike at the sigmoid with ill-defined pericolic soft tissue, markedly increased from 12/13/2019, suspicious for progression of disease with concern for superimposed proctocolitis. The pericolic soft tissue is inseparable from the bladder dome and bladder wall involvement cannot be excluded. In the low rectum, there is a 2.8 cm ill-defined fluid collection, likely corresponding to the report of purulent mucinous drainage.   Also, new extensive conglomerate and centrally necrotic retroperitoneal lymphadenopathy, reflecting progression of disease.   - Surgery consulted, did not recommend any acute surgical intervention. Per repeat d/w surgery on 4/6 no acute surgical intervention   - Heme/ on consulted, recs appreciated   - ID consulted, recs appreciated. C/w zosyn for now (4/3- present), blood cx NTD. Leukocytosis improving. Per ID, likely an infectious process considering improvement in leukocytosis on IV abx. Okay to d/c on PO Cefatin and flagyl x 2 wks from ID perspective with follow up CT A/P in 2 wks. Pt can follow up with oncologist and/ or ID for repeat imaging follow up.   - Pain control with tylenol for mild/moderate, oxycodone for severe pain

## 2020-04-07 NOTE — PROGRESS NOTE ADULT - ASSESSMENT
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  - When DC planning, would plan to send on Ceftin 500mg q 12 and Flagyl 500mg q 12 for 2 week course; needs repeat CT A/P as outpatient in 10-14 days to determine status infection  2) Malignancy  - Defer timing of chemo to Heme/Onc team, from ID perspective, optimally would complete abx prior to next round chemo (but this depends on risk/benefits, urgency of chemo)  3) Leukocytosis  - F/U BCXs  - Monitor for alternate signs infection    After DC, follow up in ID clinic PRN  Discussed with medicine team    Jass Schmitt MD  Pager [phone]  After 5pm and on weekends call [phone]

## 2020-04-07 NOTE — PROGRESS NOTE ADULT - SUBJECTIVE AND OBJECTIVE BOX
CC: F/U for Abscess    Saw/spoke to patient. No fevers, no chills. No new complaints. Unchanged.    Allergies  latex (Rash)  No Known Drug Allergies    ANTIMICROBIALS:  piperacillin/tazobactam IVPB.. 3.375 every 8 hours    PE:    Vital Signs Last 24 Hrs  T(C): 36.7 (07 Apr 2020 06:29), Max: 36.8 (06 Apr 2020 15:36)  T(F): 98 (07 Apr 2020 06:29), Max: 98.2 (06 Apr 2020 15:36)  HR: 88 (07 Apr 2020 06:29) (80 - 90)  BP: 154/90 (07 Apr 2020 06:29) (147/83 - 154/90)  RR: 19 (07 Apr 2020 06:29) (16 - 19)  SpO2: 96% (07 Apr 2020 06:29) (96% - 99%)    Gen: AOx3, NAD, non-toxic  CV: S1+S2 normal, nontachycardic  Resp: Clear bilat, no resp distress, no crackles/wheezes  Abd: Soft, nontender, +BS  Ext: No LE edema, no wounds    LABS:                        10.9   7.89  )-----------( 520      ( 07 Apr 2020 05:30 )             33.2     04-07    132<L>  |  97<L>  |  20  ----------------------------<  88  3.5   |  20<L>  |  1.29    Ca    9.1      07 Apr 2020 05:30  Phos  2.8     04-07  Mg     2.1     04-07    MICROBIOLOGY:    .Blood Blood-Venous  04-03-20   No growth to date.    RADIOLOGY:    4/2 CT:    IMPRESSION:     Profound rectal and distal sigmoid wall edema, masslike at the sigmoid with ill-defined pericolic soft tissue, markedly increased from 12/13/2019, suspicious for progression of disease with concern for superimposed proctocolitis. The pericolic soft tissue is inseparable from the bladder dome and bladder wall involvement cannot be excluded. In the low rectum, there is a 2.8 cm ill-defined fluid collection, likely corresponding to the report of purulent mucinous drainage.   Also, new extensive conglomerate and centrally necrotic retroperitoneal lymphadenopathy, reflecting progression of disease. Moderate right hydroureteronephrosis to the level of the necrotic lymphadenopathy.    Trace bilateral pleural effusions with bibasilar subsegmental atelectasis. A new 3 mm right middle lobe subpleural nodule.

## 2020-04-07 NOTE — PROGRESS NOTE ADULT - PROBLEM SELECTOR PLAN 8
1.  Name of PCP:   2.  PCP Contacted on Admission: [  ] Yes   3.  PCP contacted at Discharge: [  ] Y    [ ] N    [ ] N/A  4.  Post-Discharge Appointment Date and Location:  5.  Summary of Handoff given to PCP:
1.  Name of PCP:   2.  PCP Contacted on Admission: [  ] Yes   3.  PCP contacted at Discharge: [  ] Y    [ ] N    [ ] N/A  4.  Post-Discharge Appointment Date and Location:  5.  Summary of Handoff given to PCP:

## 2020-04-07 NOTE — DISCHARGE NOTE PROVIDER - HOSPITAL COURSE
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 Also found to be significantly hyponatremic to 115.          Problem/Plan - 1:    ·  Problem: Hyponatremia.  Plan: Sodium 118 on admission, repeat Na 115 after NS bolus. Patient with overall weakness, mental status fine, denies dizziness, n/v. Improving.     - Patient with history of hyponatremia, Na 127-130 on most recent admission in March. Patient was treated with salt tabs at that time.     - Serum osmolality 258    - May be secondary to hypovolemic hyponatremia as patient endorsing decrease PO intake. Can also be SIADH in setting of cancer and/or pain.    - C/w 1L fluid restriction and 2g TID salt tabs.     - continue to monitor BMPs. Do not overcorrect.    - Renal consulted, recs appreciated. C/w 1L fluid restriction and 2g TID salt tabs. Okay to discharge on this regimen. Pt will need follow up BMP as an out pt.             Problem/Plan - 2:    ·  Problem: Colon cancer.  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    - Plan was for patient to resume chemotherapy on 4/06 with 5FU/Vectibix    - CT here with profound rectal and distal sigmoid wall edema, masslike at the sigmoid with ill-defined pericolic soft tissue, markedly increased from 12/13/2019, suspicious for progression of disease with concern for superimposed proctocolitis. The pericolic soft tissue is inseparable from the bladder dome and bladder wall involvement cannot be excluded. In the low rectum, there is a 2.8 cm ill-defined fluid collection, likely corresponding to the report of purulent mucinous drainage.     Also, new extensive conglomerate and centrally necrotic retroperitoneal lymphadenopathy, reflecting progression of disease.     - Surgery consulted, did not recommend any acute surgical intervention. Per repeat d/w surgery on 4/6 no acute surgical intervention     - Heme/ on consulted, recs appreciated     - ID consulted, recs appreciated. C/w zosyn for now (4/3- present), blood cx NTD. Leukocytosis improving. Per ID, likely an infectious process considering improvement in leukocytosis on IV abx. Okay to d/c on PO Cefatin and flagyl x 2 wks from ID perspective with follow up CT A/P in 2 wks. Pt can follow up with oncologist and/ or ID for repeat imaging follow up.     - Pain control with tylenol for mild/moderate, oxycodone for severe pain.             Problem/Plan - 3:    ·  Problem: ROMELIA (acute kidney injury).  Plan: Scr 1.21 on admission and was 1.06 on March 12th, 2020. Possibly pre-renal in the setting of decreased PO intake. Improving.     - Continue to monitor BMPs     - Avoid nephrotoxins, renally dose meds    - Renal consulted, recs appreciated. Encourage PO intake.             Problem/Plan - 4:    ·  Problem: Hypertension.  Plan: - Hold Lisinopril 10 mg in the setting of ROMELIA     - Monitor blood pressure.          Problem/Plan - 5:    ·  Problem: Hyperlipidemia, unspecified hyperlipidemia type.  Plan: - Noted on recent labs,     - Unclear if, given age and cancer, if starting statin now has benefit > risk    - Would defer to oncology/PCP regarding starting statin.          Problem/Plan - 6:    Problem: Diabetes. Plan: Patient with pre-diabetes, A1C 5.9 in 03/2020    - ISS and carb consistent diet.         Problem/Plan - 7:    ·  Problem: Need for prophylactic measure.  Plan: Dvt pxx: hep subq     Diet: Regular    Dispo: Home.         Pt is now stable and ready for discharge home. 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 During hospitalization, a subsequent attempt was made to explore the patient and excise the lesion however the rectosigmoid mass was found to be invading the bladder and was unresectable.  Patient was discharged from the hospital in mid march and had plans to resume chemotherapy on 4/6/20.         Currently patient presenting with worsening rectal discharge that he describes as mucus. Denies bloody discharge. Patient also endorsing weakness and decreased po intake. Patient denies fevers, dizziness, URI symptoms, chest pain, sob, n/v/d or urinary symptoms. Patient says that ostomy has been functioning normally.         In ED, patient afebrile, tachycardic, normotensive and sating well on RA. Patient found to be hyponatremic to 118 initially, with subsequent sodium 115.     CTAP revealed profound rectal and distal sigmoid wall edema, masslike at the sigmoid with ill-defined pericolic soft tissue, markedly increased from 12/13/2019, suspicious for progression of disease with concern for superimposed proctocolitis. The pericolic soft tissue is inseparable from the bladder dome and bladder wall involvement cannot be excluded. In the low rectum, there is a 2.8 cm ill-defined fluid collection, likely corresponding to the report of purulent mucinous drainage. Also, new extensive conglomerate and centrally necrotic retroperitoneal lymphadenopathy, reflecting progression of disease. Moderate right hydroureteronephrosis to the level of the necrotic lymphadenopathy. (03 Apr 2020 07:49)        Hospital course: ID was consulted and pt was continued on Zosyn with improvement in his WBC. Pt will be discharged with cefuroxime and flagyl x 2 wks and will need a repeat CT A/P in 2 weeks as well. Surgery was consulted and no acute surgical intervention was indicated. Nephrology was consulted and pt was continued on salt tabs and fluid restriction with improvement of his hyponatremia. Pt will be discharged with salt tabs and fluid restriction and should follow up with his PCP or oncologist to monitor serum Na. Pt should follow up with his oncologist after discharge. Pt's lisinopril was held on discharge in the setting of an improving ROMELIA. He was also noted to have an elevated LDL level and should follow up with his PCP and/or oncologist to decide if a statin is indicated. Pt is now stable and ready for discharge.                                              Problem/Plan - 1:    ·  Problem: Hyponatremia.  Plan: Sodium 118 on admission, repeat Na 115 after NS bolus. Patient with overall weakness, mental status fine, denies dizziness, n/v. Improving.     - Patient with history of hyponatremia, Na 127-130 on most recent admission in March. Patient was treated with salt tabs at that time.     - Serum osmolality 258    - May be secondary to hypovolemic hyponatremia as patient endorsing decrease PO intake. Can also be SIADH in setting of cancer and/or pain.    - C/w 1L fluid restriction and 2g TID salt tabs.     - continue to monitor BMPs. Do not overcorrect.    - Renal consulted, recs appreciated. C/w 1L fluid restriction and 2g TID salt tabs. Okay to discharge on this regimen. Pt will need follow up BMP as an out pt.             Problem/Plan - 2:    ·  Problem: Colon cancer.  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    - Plan was for patient to resume chemotherapy on 4/06 with 5FU/Vectibix    - CT here with profound rectal and distal sigmoid wall edema, masslike at the sigmoid with ill-defined pericolic soft tissue, markedly increased from 12/13/2019, suspicious for progression of disease with concern for superimposed proctocolitis. The pericolic soft tissue is inseparable from the bladder dome and bladder wall involvement cannot be excluded. In the low rectum, there is a 2.8 cm ill-defined fluid collection, likely corresponding to the report of purulent mucinous drainage.     Also, new extensive conglomerate and centrally necrotic retroperitoneal lymphadenopathy, reflecting progression of disease.     - Surgery consulted, did not recommend any acute surgical intervention. Per repeat d/w surgery on 4/6 no acute surgical intervention     - Heme/ on consulted, recs appreciated     - ID consulted, recs appreciated. C/w zosyn for now (4/3- present), blood cx NTD. Leukocytosis improving. Per ID, likely an infectious process considering improvement in leukocytosis on IV abx. Okay to d/c on PO Cefatin and flagyl x 2 wks from ID perspective with follow up CT A/P in 2 wks. Pt can follow up with oncologist and/ or ID for repeat imaging follow up.     - Pain control with tylenol for mild/moderate, oxycodone for severe pain.             Problem/Plan - 3:    ·  Problem: ROMELIA (acute kidney injury).  Plan: Scr 1.21 on admission and was 1.06 on March 12th, 2020. Possibly pre-renal in the setting of decreased PO intake. Improving.     - Continue to monitor BMPs     - Avoid nephrotoxins, renally dose meds    - Renal consulted, recs appreciated. Encourage PO intake.             Problem/Plan - 4:    ·  Problem: Hypertension.  Plan: - Hold Lisinopril 10 mg in the setting of ROMELIA     - Monitor blood pressure.          Problem/Plan - 5:    ·  Problem: Hyperlipidemia, unspecified hyperlipidemia type.  Plan: - Noted on recent labs,     - Unclear if, given age and cancer, if starting statin now has benefit > risk    - Would defer to oncology/PCP regarding starting statin.          Problem/Plan - 6:    Problem: Diabetes. Plan: Patient with pre-diabetes, A1C 5.9 in 03/2020    - ISS and carb consistent diet.         Problem/Plan - 7:    ·  Problem: Need for prophylactic measure.  Plan: Dvt pxx: hep subq     Diet: Regular    Dispo: Home.         Pt is now stable and ready for discharge home. 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 During hospitalization, a subsequent attempt was made to explore the patient and excise the lesion however the rectosigmoid mass was found to be invading the bladder and was unresectable.  Patient was discharged from the hospital in mid march and had plans to resume chemotherapy on 4/6/20.         Currently patient presenting with worsening rectal discharge that he describes as mucus. Denies bloody discharge. Patient also endorsing weakness and decreased po intake. Patient denies fevers, dizziness, URI symptoms, chest pain, sob, n/v/d or urinary symptoms. Patient says that ostomy has been functioning normally.         In ED, patient afebrile, tachycardic, normotensive and sating well on RA. Patient found to be hyponatremic to 118 initially, with subsequent sodium 115.     CTAP revealed profound rectal and distal sigmoid wall edema, masslike at the sigmoid with ill-defined pericolic soft tissue, markedly increased from 12/13/2019, suspicious for progression of disease with concern for superimposed proctocolitis. The pericolic soft tissue is inseparable from the bladder dome and bladder wall involvement cannot be excluded. In the low rectum, there is a 2.8 cm ill-defined fluid collection, likely corresponding to the report of purulent mucinous drainage. Also, new extensive conglomerate and centrally necrotic retroperitoneal lymphadenopathy, reflecting progression of disease. Moderate right hydroureteronephrosis to the level of the necrotic lymphadenopathy. (03 Apr 2020 07:49)        Hospital course: ID was consulted and pt was continued on Zosyn with improvement in his WBC. Pt will be discharged with cefuroxime and flagyl x 2 wks and will need a repeat CT A/P in 2 weeks as well. Surgery was consulted and no acute surgical intervention was indicated. Nephrology was consulted and pt was continued on salt tabs and fluid restriction with improvement of his hyponatremia. Pt will be discharged with salt tabs and fluid restriction and should follow up with his PCP or oncologist to monitor serum Na. Pt should follow up with his oncologist after discharge. Pt's lisinopril was held on discharge in the setting of an improving ROMELIA. He was also noted to have an elevated LDL level and should follow up with his PCP and/or oncologist to decide if a statin is indicated. Pt is now stable and ready for discharge.                                              Problem/Plan - 1:    ·  Problem: Hyponatremia.  Plan: Sodium 118 on admission, repeat Na 115 after NS bolus. Patient with overall weakness, mental status fine, denies dizziness, n/v. Improving.     - Patient with history of hyponatremia, Na 127-130 on most recent admission in March. Patient was treated with salt tabs at that time.     - Serum osmolality 258    - May be secondary to hypovolemic hyponatremia as patient endorsing decrease PO intake. Can also be SIADH in setting of cancer and/or pain.    - C/w 1L fluid restriction and 2g TID salt tabs.     - continue to monitor BMPs. Do not overcorrect.    - Renal consulted, recs appreciated. C/w 1L fluid restriction and 2g TID salt tabs. Okay to discharge on this regimen. Pt will need follow up BMP as an out pt.             Problem/Plan - 2:    ·  Problem: Colon cancer.  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    - Plan was for patient to resume chemotherapy on 4/06 with 5FU/Vectibix    - CT here with profound rectal and distal sigmoid wall edema, masslike at the sigmoid with ill-defined pericolic soft tissue, markedly increased from 12/13/2019, suspicious for progression of disease with concern for superimposed proctocolitis. The pericolic soft tissue is inseparable from the bladder dome and bladder wall involvement cannot be excluded. In the low rectum, there is a 2.8 cm ill-defined fluid collection, likely corresponding to the report of purulent mucinous drainage.     Also, new extensive conglomerate and centrally necrotic retroperitoneal lymphadenopathy, reflecting progression of disease.     - Surgery consulted, did not recommend any acute surgical intervention. Per repeat d/w surgery on 4/6 no acute surgical intervention     - Heme/ on consulted, recs appreciated     - ID consulted, recs appreciated. C/w zosyn for now (4/3- present), blood cx NTD. Leukocytosis improving. Per ID, likely an infectious process considering improvement in leukocytosis on IV abx. Okay to d/c on PO Ceftin and flagyl x 2 wks from ID perspective with follow up CT A/P in 2 wks. Pt can follow up with oncologist and/ or ID for repeat imaging follow up.     - Pain control with tylenol for mild/moderate, oxycodone for severe pain.             Problem/Plan - 3:    ·  Problem: ROMELIA (acute kidney injury).  Plan: Scr 1.21 on admission and was 1.06 on March 12th, 2020. Possibly pre-renal in the setting of decreased PO intake. Improving.     - Continue to monitor BMPs     - Avoid nephrotoxins, renally dose meds    - Renal consulted, recs appreciated. Encourage PO intake.             Problem/Plan - 4:    ·  Problem: Hypertension.  Plan: - Hold Lisinopril 10 mg in the setting of ROMELIA     - Monitor blood pressure.          Problem/Plan - 5:    ·  Problem: Hyperlipidemia, unspecified hyperlipidemia type.  Plan: - Noted on recent labs,     - Unclear if, given age and cancer, if starting statin now has benefit > risk    - Would defer to oncology/PCP regarding starting statin.          Problem/Plan - 6:    Problem: Diabetes. Plan: Patient with pre-diabetes, A1C 5.9 in 03/2020    - ISS and carb consistent diet.         Problem/Plan - 7:    ·  Problem: Need for prophylactic measure.  Plan: Dvt pxx: hep subq     Diet: Regular    Dispo: Home. 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 During hospitalization, a subsequent attempt was made to explore the patient and excise the lesion however the rectosigmoid mass was found to be invading the bladder and was unresectable.  Patient was discharged from the hospital in mid march and had plans to resume chemotherapy on 4/6/20.         Currently patient presenting with worsening rectal discharge that he describes as mucus. Denies bloody discharge. Patient also endorsing weakness and decreased po intake. Patient denies fevers, dizziness, URI symptoms, chest pain, sob, n/v/d or urinary symptoms. Patient says that ostomy has been functioning normally.         In ED, patient afebrile, tachycardic, normotensive and sating well on RA. Patient found to be hyponatremic to 118 initially, with subsequent sodium 115.     CTAP revealed profound rectal and distal sigmoid wall edema, masslike at the sigmoid with ill-defined pericolic soft tissue, markedly increased from 12/13/2019, suspicious for progression of disease with concern for superimposed proctocolitis. The pericolic soft tissue is inseparable from the bladder dome and bladder wall involvement cannot be excluded. In the low rectum, there is a 2.8 cm ill-defined fluid collection, likely corresponding to the report of purulent mucinous drainage. Also, new extensive conglomerate and centrally necrotic retroperitoneal lymphadenopathy, reflecting progression of disease. Moderate right hydroureteronephrosis to the level of the necrotic lymphadenopathy. (03 Apr 2020 07:49)        Hospital course: ID was consulted and pt was continued on Zosyn with improvement in his WBC. Pt will be discharged with cefuroxime and flagyl x 2 wks and will need a repeat CT A/P in 2 weeks as well. Surgery was consulted and no acute surgical intervention was indicated. Nephrology was consulted and pt was continued on salt tabs and fluid restriction with improvement of his hyponatremia. Pt will be discharged with salt tabs and fluid restriction and should follow up with his PCP this week to monitor serum Na and determine if salt tabs and fluid restriction are still indicated. A call was placed to Dr. Davidson Saldaña's office (per pt his PCP) for signout. Pt should follow up with his oncologist after discharge. Pt's lisinopril was held on discharge in the setting of an improving ROMELIA. He was also noted to have an elevated LDL level and should follow up with his PCP and/or oncologist to decide if a statin is indicated. Pt is now stable and ready for discharge.                                              Problem/Plan - 1:    ·  Problem: Hyponatremia.  Plan: Sodium 118 on admission, repeat Na 115 after NS bolus. Patient with overall weakness, mental status fine, denies dizziness, n/v. Improving.     - Patient with history of hyponatremia, Na 127-130 on most recent admission in March. Patient was treated with salt tabs at that time.     - Serum osmolality 258    - May be secondary to hypovolemic hyponatremia as patient endorsing decrease PO intake. Can also be SIADH in setting of cancer and/or pain.    - C/w 1L fluid restriction and 2g TID salt tabs.     - continue to monitor BMPs. Do not overcorrect.    - Renal consulted, recs appreciated. C/w 1L fluid restriction and 2g TID salt tabs. Okay to discharge on this regimen. Pt will need follow up BMP as an out pt. Plan discussed with pt, oncology and message left for PCP.              Problem/Plan - 2:    ·  Problem: Colon cancer.  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    - Plan was for patient to resume chemotherapy on 4/06 with 5FU/Vectibix    - CT here with profound rectal and distal sigmoid wall edema, masslike at the sigmoid with ill-defined pericolic soft tissue, markedly increased from 12/13/2019, suspicious for progression of disease with concern for superimposed proctocolitis. The pericolic soft tissue is inseparable from the bladder dome and bladder wall involvement cannot be excluded. In the low rectum, there is a 2.8 cm ill-defined fluid collection, likely corresponding to the report of purulent mucinous drainage.     Also, new extensive conglomerate and centrally necrotic retroperitoneal lymphadenopathy, reflecting progression of disease.     - Surgery consulted, did not recommend any acute surgical intervention. Per repeat d/w surgery on 4/6 no acute surgical intervention     - Heme/ on consulted, recs appreciated     - ID consulted, recs appreciated. C/w zosyn for now (4/3- present), blood cx NTD. Leukocytosis improving. Per ID, likely an infectious process considering improvement in leukocytosis on IV abx. Okay to d/c on PO Ceftin and flagyl x 2 wks from ID perspective with follow up CT A/P in 2 wks. Pt can follow up with oncologist and/ or ID for repeat imaging follow up.     - Pain control with tylenol for mild/moderate, oxycodone for severe pain.             Problem/Plan - 3:    ·  Problem: ROMELIA (acute kidney injury).  Plan: Scr 1.21 on admission and was 1.06 on March 12th, 2020. Possibly pre-renal in the setting of decreased PO intake. Improving.     - Continue to monitor BMPs     - Avoid nephrotoxins, renally dose meds    - Renal consulted, recs appreciated. Encourage PO intake.             Problem/Plan - 4:    ·  Problem: Hypertension.  Plan: - Hold Lisinopril 10 mg in the setting of ROMELIA     - Monitor blood pressure.          Problem/Plan - 5:    ·  Problem: Hyperlipidemia, unspecified hyperlipidemia type.  Plan: - Noted on recent labs,     - Unclear if, given age and cancer, if starting statin now has benefit > risk    - Would defer to oncology/PCP regarding starting statin.          Problem/Plan - 6:    Problem: Diabetes. Plan: Patient with pre-diabetes, A1C 5.9 in 03/2020    - ISS and carb consistent diet.         Problem/Plan - 7:    ·  Problem: Need for prophylactic measure.  Plan: Dvt pxx: hep subq     Diet: Regular    Dispo: Home.         Pt reports his PCP is Dr. Davidson Saldaña ([phone]). A call was placed for signout.

## 2020-04-07 NOTE — DISCHARGE NOTE PROVIDER - NSDCMRMEDTOKEN_GEN_ALL_CORE_FT
acetaminophen 325 mg oral tablet: 2 tab(s) orally every 6 hours  Aspir 81 oral delayed release tablet: 1  orally once a week last dose on 2/28/20  lisinopril 10 mg oral tablet: 1 tab(s) orally once a day  magnesium oxide 400 mg (240 mg elemental magnesium) oral tablet: 1  orally 2 times a day  metFORMIN 500 mg oral tablet, extended release: 1 tab(s) orally once a day acetaminophen 325 mg oral tablet: 2 tab(s) orally every 6 hours  Aspir 81 oral delayed release tablet: 1  orally once a week last dose on 2/28/20  cefuroxime 500 mg oral tablet: 1 tab(s) orally every 12 hours   Flagyl 500 mg oral tablet: 1 tab(s) orally every 12 hours   magnesium oxide 400 mg (240 mg elemental magnesium) oral tablet: 1  orally 2 times a day  metFORMIN 500 mg oral tablet, extended release: 1 tab(s) orally once a day  sodium chloride 1 g oral tablet: 2 tab(s) orally 3 times a day Aspir 81 oral delayed release tablet: 1  orally once a week last dose on 2/28/20  cefuroxime 500 mg oral tablet: 1 tab(s) orally every 12 hours   Flagyl 500 mg oral tablet: 1 tab(s) orally every 12 hours   magnesium oxide 400 mg (240 mg elemental magnesium) oral tablet: 1  orally 2 times a day  metFORMIN 500 mg oral tablet, extended release: 1 tab(s) orally once a day  sodium chloride 1 g oral tablet: 2 tab(s) orally 3 times a day

## 2020-04-07 NOTE — PROGRESS NOTE ADULT - PROBLEM SELECTOR PLAN 3
Scr 1.21 on admission and was 1.06 on March 12th, 2020. Possibly pre-renal in the setting of decreased PO intake.   - Continue to monitor BMPs   - Avoid nephrotoxins, renally dose meds  - Renal consulted, recs appreciated. Encourage PO intake. Scr 1.21 on admission and was 1.06 on March 12th, 2020. Possibly pre-renal in the setting of decreased PO intake. Improving.   - Continue to monitor BMPs   - Avoid nephrotoxins, renally dose meds  - Renal consulted, recs appreciated. Encourage PO intake.

## 2020-04-18 NOTE — H&P ADULT - HISTORY OF PRESENT ILLNESS
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 Pt states his blood was tested at home yesterday and his sodium was 132. He has felt generally weak since discharge. He denies CP/palpitations, N/V, abd pain. Poor PO intake while at home. He was supposed to start chemotherapy last Monday but was not able to because of his hospitalization.    Pt's daughter-in-law, Fatuma, reports that since discharge pt has had difficulty sleeping and having crampy abdominal pains. This morning she noticed he was having shortness of breath, prior she associated it with the abdominal pain, but has worsened. Wife denies fevers or cough.   Pt has been home since discharge, only ppl visited home were those who did labs.      Patient received potassium 40meq in ED.

## 2020-04-18 NOTE — ED PROVIDER NOTE - NSFOLLOWUPINSTRUCTIONS_ED_ALL_ED_FT
You were seen in the Emergency Department for generalized weakness.   1) Advance activity as tolerated.  Please remain isolated for 14 days from your onset of symptoms to avoid contraction or spread of coronavirus.  2) Continue all previously prescribed medications as directed.    3) Follow up with your oncologist in 24-48 hours - take copies of your results.    4) Return to the Emergency Department for worsening or persistent symptoms, and/or ANY NEW OR CONCERNING SYMPTOMS. If you have issues obtaining follow up, please call: [phone] to obtain a doctor or specialist who takes your insurance in your area.

## 2020-04-18 NOTE — H&P ADULT - NSHPPHYSICALEXAM_GEN_ALL_CORE
.  VITAL SIGNS:  T(C): 36.7 (04-18-20 @ 19:53), Max: 36.7 (04-18-20 @ 14:34)  T(F): 98 (04-18-20 @ 19:53), Max: 98 (04-18-20 @ 14:34)  HR: 100 (04-18-20 @ 19:53) (99 - 120)  BP: 145/70 (04-18-20 @ 19:53) (113/53 - 151/85)  BP(mean): --  RR: 25 (04-18-20 @ 19:53) (24 - 27)  SpO2: 97% (04-18-20 @ 19:53) (92% - 99%)  Wt(kg): --    PHYSICAL EXAM:    Constitutional: frail, chronically-ill appearing but non toxic  HEENT: +blue surgical mask   Neck: supple  Respiratory: CTAB, no respiratory distress, no wheezes/rhonchi/rales B/L, speaking in full sentences  Chest: +  Cardiac: +S1/S2; tachycardic, regular rhythm, no M/R/G  Gastrointestinal: soft, NT/ND; no rebound or guarding; +BSx4  Back: spine midline, no bony tenderness or step-offs; no CVAT B/L  Extremities: WWP, no clubbing or cyanosis; no peripheral edema  Musculoskeletal: NROM x4; no joint swelling, tenderness or erythema  Vascular: 2+ radial, femoral, DP/PT pulses B/L  Dermatologic: skin warm, dry and intact; no rashes, wounds, or scars  Neurologic: AAOx3; CNII-XII grossly intact; no focal deficits  Psychiatric: affect and characteristics of appearance, verbalizations, behaviors are appropriate

## 2020-04-18 NOTE — ED PROVIDER NOTE - PATIENT PORTAL LINK FT
You can access the FollowMyHealth Patient Portal offered by [facility] by registering at the following website: [email or web address]. By joining [facility]’s FollowMyHealth portal, you will also be able to view your health information using other applications (apps) compatible with our system.

## 2020-04-18 NOTE — ED CLERICAL - NS ED CLERK NOTE PRE-ARRIVAL INFORMATION; ADDITIONAL PRE-ARRIVAL INFORMATION

## 2020-04-18 NOTE — ED PROVIDER NOTE - PROGRESS NOTE DETAILS
pt pending workup, endorse to DR Rico Cristin Beaulieu M.D. Resident: Pt reports feeling better s/p eating, labs reviewed with patient, will DC home with oncology follow up Cristin Beaulieu M.D. Resident: Pt reevaluated, SpO2 89% on RA, placed on 2L NC and SpO2 96%, reports some SOB, will admit and covid swab

## 2020-04-18 NOTE — ED PROVIDER NOTE - OBJECTIVE STATEMENT
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 Pt states his blood was tested at home yesterday and his sodium was 132. He has felt generally weak since discharge. He denies CP/palpitations, N/V, abd pain. Poor PO intake while at home. He was supposed to start chemotherapy last Monday but was not able to because of his hospitalization.

## 2020-04-18 NOTE — H&P ADULT - PROBLEM SELECTOR PLAN 1
Desaturating to 88% on RA per ED, CXR with bilateral patchy opacities, suspected COVID-19 infection in setting of recent hospitalization.   -Will discuss with clinical ID pharmacist regarding role of Plaquenil  -f/u COVID-19 PCR  -C/w supplemental O2 via NC w/goal SpO2 of 92-96%  -Supportive care with Tylenol, Albuterol HFA and cough syrup   -f/u inflammatory markers in AM Desaturating to 88% on RA per ED, CXR with bilateral patchy opacities, suspected COVID-19 infection in setting of recent hospitalization.   -Start Plaquenil x5 days (qtc pending)   -f/u COVID-19 PCR  -C/w supplemental O2 via NC w/goal SpO2 of 92-96%  -Supportive care with Tylenol, Albuterol HFA and cough syrup   -f/u inflammatory markers in AM Desaturating to 88% on RA per ED, CXR with bilateral patchy opacities, suspected COVID-19 infection in setting of recent hospitalization.   -Recommended for Plaquenil, but pt refusing until COVID PCR results   -f/u COVID-19 PCR  -C/w supplemental O2 via NC w/goal SpO2 of 92-96%  -Supportive care with Tylenol, Albuterol HFA and cough syrup   -f/u inflammatory markers in AM, if elevated D-dimer would consider PE, however CXR reviewed by me and most likely COVID-19 infection.

## 2020-04-18 NOTE — H&P ADULT - PROBLEM SELECTOR PLAN 2
?Abscess/colitis, Last admission pt found to have possible proctocolitis and collection on CT. Was seen by surgery who did not recommend any acute surgical intervention.  -Following with Dr Schmitt outpt last day of abx (Ceftin/Flagyl) was planned to be 4/20 when pt was going to get repeat CT A/P. For now will continue IV Zosyn 3.375g q 8  -f/u Dr. Schmitt on Monday

## 2020-04-18 NOTE — ED PROVIDER NOTE - PSH
Bowel obstruction  colostomy placement 1/2019  H/O cystoscopy  5/2019 dilation of urethra  H/O exploratory laparotomy  lysis of adhesions, biopsy 5/2019  H/O partial nephrectomy  left, 2000- benign tumor  H/O prostatectomy  2001  History of chemotherapy  chemo port placement

## 2020-04-18 NOTE — H&P ADULT - PROBLEM SELECTOR PLAN 7
Discussed goals of care with pt as there is high suspicion of COVID-19 in addition to other morbidities such as infection, cancer, currently not being treated. Pt does not want resuscitation, and would like to be DNR/DNI, but would like to discuss with eldest son, Orlin helton.    Will need to continue GOC discussion tomorrow.     >16 min spent

## 2020-04-18 NOTE — H&P ADULT - PROBLEM SELECTOR PLAN 3
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

## 2020-04-18 NOTE — ED ADULT TRIAGE NOTE - CHIEF COMPLAINT QUOTE
Pt c/o weakness x6 days and thinks his sodium is low.  Discharged from here on Friday.  Also c/o cough x2 days

## 2020-04-18 NOTE — ED PROVIDER NOTE - PHYSICAL EXAMINATION
Gen: AAOx3, non-toxic, thin  Head: NCAT  HEENT: EOMI, oral mucosa moist, normal conjunctiva  Lung: CTAB, no respiratory distress, no wheezes/rhonchi/rales B/L, speaking in full sentences  CV: RRR, no murmurs, rubs or gallops, no peripheral edema  Abd: soft, NTND, no guarding, ileostomy with beefy red stoma, brown stool in bag  MSK: no visible deformities  Neuro: No focal sensory or motor deficits  Skin: Warm, well perfused, no rash, right chest wall mediport palpated  Psych: normal affect.   ~Cristin Beaulieu M.D. Resident

## 2020-04-18 NOTE — ED ADULT NURSE NOTE - OBJECTIVE STATEMENT
Pt. A&Ox3, c/o increased weakness x few days. States he was seen here last week and was told he had an infection in the blood. As per pt. he also has had low sodium levels. h/o colon ca with colostomy. Right chest wall port noted. Denies any pain, n/v/d, fevers or cough. #20g IV placed to left FA, labs sent. MD at bedside for eval. VS done as charted, placed on 2L NC prior to arrival to room. Sating 93-95 on RA. Will continue to monitor.

## 2020-04-18 NOTE — ED PROVIDER NOTE - CLINICAL SUMMARY MEDICAL DECISION MAKING FREE TEXT BOX
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

## 2020-04-18 NOTE — ED PROVIDER NOTE - NS ED ROS FT
GENERAL: No fever or chills, EYES: no change in vision, HEENT: no trouble swallowing or speaking, decreased PO, CARDIAC: no chest pain, PULMONARY: no cough or SOB, GI: no abdominal pain, no nausea, no vomiting, no diarrhea or constipation, : No changes in urination, SKIN: no rashes, NEURO: no headache,  MSK: No joint pain ~Cristin Beaulieu M.D. Resident

## 2020-04-18 NOTE — H&P ADULT - NSHPLABSRESULTS_GEN_ALL_CORE
.  LABS:                         11.3   5.48  )-----------( 343      ( 18 Apr 2020 17:22 )             33.2     04-18    133<L>  |  97<L>  |  22  ----------------------------<  116<H>  3.1<L>   |  21<L>  |  1.01    Ca    9.0      18 Apr 2020 17:22    TPro  6.9  /  Alb  3.1<L>  /  TBili  0.3  /  DBili  x   /  AST  39  /  ALT  26  /  AlkPhos  71  04-18                  RADIOLOGY, EKG & ADDITIONAL TESTS: Reviewed.  EKG:    < from: Xray Chest 1 View- PORTABLE-Urgent (04.18.20 @ 15:37) >  INTERPRETATION:  Patchy/hazy bilateral opacities. Pattern suggests infection including atypical pneumonia/viral infection from atypical agents including COVID-19  ******PRELIMINARY REPORT******    ******PRELIMINARY REPORT******        CONSTANCE HOUSE M.D., RADIOLOGY RESIDENT  < end of copied text > .  LABS:                         11.3   5.48  )-----------( 343      ( 18 Apr 2020 17:22 )             33.2     04-18    133<L>  |  97<L>  |  22  ----------------------------<  116<H>  3.1<L>   |  21<L>  |  1.01    Ca    9.0      18 Apr 2020 17:22    TPro  6.9  /  Alb  3.1<L>  /  TBili  0.3  /  DBili  x   /  AST  39  /  ALT  26  /  AlkPhos  71  04-18                  RADIOLOGY, EKG & ADDITIONAL TESTS: Reviewed.  EKG:  sinus tachycardia 114, qtc 441  < from: Xray Chest 1 View- PORTABLE-Urgent (04.18.20 @ 15:37) >  INTERPRETATION:  Patchy/hazy bilateral opacities. Pattern suggests infection including atypical pneumonia/viral infection from atypical agents including COVID-19  ******PRELIMINARY REPORT******    ******PRELIMINARY REPORT******        CONSTANCE HOUSE M.D., RADIOLOGY RESIDENT  < end of copied text >

## 2020-04-18 NOTE — H&P ADULT - ASSESSMENT
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 CXR w/patchy/hazy b/l opacities.

## 2020-04-18 NOTE — ED PROVIDER NOTE - ATTENDING CONTRIBUTION TO CARE
Attending Statement: I have personally seen and examined this patient. I have fully participated in the care of this patient. I have reviewed all pertinent clinical information, including history physical exam, plan and the Resident's note and agree except as noted  74yo M hx of prostate ca sp resection Attending Statement: I have personally seen and examined this patient. I have fully participated in the care of this patient. 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 Feeling weak x 2 days. Had labs at home. Told to have a sodium of 132. Denies any fever or chills no nausea/vomit No chest pain no sob no palpitations.no abdominal pain. Poor po intake   Vital signs noted. +tachycardic nontoxic. pulse ox 98 RA good wave form tallking in full sentences. good air entry. no retractions. thin male nontender abdomen w colostomy bag brown stool no pedal edema. no calf tenderness. normal pulses bilateral feet. +port on the anterior chest wall no erythema AO3  plan ekg, labs, cxr, re assess

## 2020-04-19 NOTE — PROGRESS NOTE ADULT - PROBLEM SELECTOR PLAN 2
?Abscess/colitis, Last admission pt found to have possible proctocolitis and collection on CT. Was seen by surgery who did not recommend any acute surgical intervention.  -Following with Dr Schmitt outpt last day of abx (Ceftin/Flagyl) was planned to be 4/20 when pt was going to get repeat CT A/P. c/w ceftin/flagyl for now  -f/u Dr. Schmitt on Monday

## 2020-04-19 NOTE — PROGRESS NOTE ADULT - PROBLEM SELECTOR PLAN 1
Desaturating to 88% on RA per ED, CXR with bilateral patchy opacities, COVID+  - start Plaquenil  -C/w supplemental O2 via NC w/goal SpO2 of 92-96%  -Supportive care with Tylenol, Albuterol HFA and cough syrup

## 2020-04-19 NOTE — PROGRESS NOTE ADULT - ASSESSMENT
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 CXR w/patchy/hazy b/l opacities c/w COVID pneumonia

## 2020-04-19 NOTE — PROGRESS NOTE ADULT - SUBJECTIVE AND OBJECTIVE BOX
[facility] Division of Hospital Medicine  Cydney Chappell MD  Pager (M-F, 8A-5P):  In-house pager [phone]; Long-range pager [phone]  Other Times:  Please page Hospitalist in Charge -  In-house pager [phone]    Patient is a 75y old  Male who presents with a chief complaint of cough (18 Apr 2020 18:37)      SUBJECTIVE / OVERNIGHT EVENTS:  Pt seen earlier, says he was weak yesterday, Na 132.  Now says he feels ok, little cough, no fever/chills/diarrhea/loss of taste or smell.   ADDITIONAL REVIEW OF SYSTEMS:    MEDICATIONS  (STANDING):  ALBUTerol    90 MICROgram(s) HFA Inhaler 2 Puff(s) Inhalation every 6 hours  aspirin enteric coated 81 milliGRAM(s) Oral daily  cefuroxime   Tablet 500 milliGRAM(s) Oral every 12 hours  dextrose 5%. 1000 milliLiter(s) (50 mL/Hr) IV Continuous <Continuous>  dextrose 50% Injectable 12.5 Gram(s) IV Push once  dextrose 50% Injectable 25 Gram(s) IV Push once  dextrose 50% Injectable 25 Gram(s) IV Push once  enoxaparin Injectable 40 milliGRAM(s) SubCutaneous daily  hydroxychloroquine   Oral   insulin lispro (HumaLOG) corrective regimen sliding scale   SubCutaneous Before meals and at bedtime  metroNIDAZOLE    Tablet 500 milliGRAM(s) Oral every 12 hours  sodium chloride 2 Gram(s) Oral three times a day    MEDICATIONS  (PRN):  acetaminophen   Tablet .. 650 milliGRAM(s) Oral every 6 hours PRN Temp greater or equal to 38.5C (101.3F)  benzonatate 100 milliGRAM(s) Oral three times a day PRN Cough  dextrose 40% Gel 15 Gram(s) Oral once PRN Blood Glucose LESS THAN 70 milliGRAM(s)/deciliter  glucagon  Injectable 1 milliGRAM(s) IntraMuscular once PRN Glucose LESS THAN 70 milligrams/deciliter  guaifenesin/dextromethorphan  Syrup 10 milliLiter(s) Oral every 6 hours PRN Cough      CAPILLARY BLOOD GLUCOSE      POCT Blood Glucose.: 93 mg/dL (19 Apr 2020 16:58)  POCT Blood Glucose.: 99 mg/dL (19 Apr 2020 12:25)  POCT Blood Glucose.: 97 mg/dL (19 Apr 2020 08:30)  POCT Blood Glucose.: 98 mg/dL (18 Apr 2020 21:34)  POCT Blood Glucose.: 99 mg/dL (18 Apr 2020 18:54)    I&O's Summary      PHYSICAL EXAM:  Vital Signs Last 24 Hrs  T(C): 36.7 (19 Apr 2020 12:45), Max: 36.8 (18 Apr 2020 21:15)  T(F): 98.1 (19 Apr 2020 12:45), Max: 98.3 (18 Apr 2020 21:15)  HR: 88 (19 Apr 2020 12:45) (88 - 100)  BP: 138/75 (19 Apr 2020 12:45) (130/81 - 151/85)  BP(mean): --  RR: 22 (19 Apr 2020 12:45) (22 - 28)  SpO2: 95% (19 Apr 2020 12:45) (91% - 97%)    CONSTITUTIONAL: elderly M, sitting up in bed, NAD, NC O2  RESPIRATORY: Normal respiratory effort; lungs are clear to auscultation bilaterally  CARDIOVASCULAR: Regular rate and rhythm, normal S1 and S2, no murmur/rub/gallop; No lower extremity edema; Peripheral pulses are 2+ bilaterally  ABDOMEN: Nontender to palpation, normoactive bowel sounds, no rebound/guarding; +ostomy brown stool in bag  PSYCH: calm  NEUROLOGY: nonfocal  SKIN: No rashes    LABS:                        11.4   6.17  )-----------( 347      ( 19 Apr 2020 06:03 )             33.9     04-19    132<L>  |  98  |  22  ----------------------------<  102<H>  3.9   |  20<L>  |  1.15    Ca    8.6      19 Apr 2020 06:05  Phos  3.1     04-19  Mg     2.0     04-19    TPro  6.9  /  Alb  3.1<L>  /  TBili  0.3  /  DBili  x   /  AST  39  /  ALT  26  /  AlkPhos  71  04-18                RADIOLOGY & ADDITIONAL TESTS:  Results Reviewed:   Imaging Personally Reviewed:  Electrocardiogram Personally Reviewed:    COORDINATION OF CARE:  Care Discussed with Consultants/Other Providers [Y/N]: RN, SW, CM, ACP re overall care   Prior or Outpatient Records Reviewed [Y/N]:

## 2020-04-19 NOTE — PROGRESS NOTE ADULT - PROBLEM SELECTOR PLAN 7
Discussed goals of care with pt as there is high suspicion of COVID-19 in addition to other morbidities such as infection, cancer, currently not being treated. Pt does not want resuscitation, and would like to be DNR/DNI, but would like to discuss with eldest son, Orlin helton.

## 2020-04-20 NOTE — CONSULT NOTE ADULT - SUBJECTIVE AND OBJECTIVE BOX
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 Pt states his blood was tested at home yesterday and his sodium was 132. He has felt generally weak since discharge. He denies CP/palpitations, N/V, abd pain. Poor PO intake while at home. He was supposed to start chemotherapy last Monday but was not able to because of his hospitalization.    Pt's daughter-in-law, Fatuma, reports that since discharge pt has had difficulty sleeping and having crampy abdominal pains. This morning she noticed he was having shortness of breath, prior she associated it with the abdominal pain, but has worsened. Wife denies fevers or cough.   Pt has been home since discharge, only ppl visited home were those who did labs.      Patient received potassium 40meq in ED. (18 Apr 2020 18:37)"    Above reviewed. Saw/spoke to patient. Patient known to me form prior visit. Patient was prior seen for suspected proctocolitis with associated abscess vs malignancy. After discharge, patient was found to have significant fatigue so returned and found to be COVID positive. No fevers, no chills. Minimal SOB. No cough. No abd pain. Still with diarrhea. Otherwise no other new focal complaints. Patient on nasal canula. ID called for further eval.    PAST MEDICAL & SURGICAL HISTORY:  Diverticulosis  Diabetes mellitus: type 2  Prostate Cancer: denies chemo and radiation  Renal Cancer: denies chemo and radiation  Hyponatremia  Hyperlipidemia  Benign Essential Hypertension  History of chemotherapy: chemo port placement  H/O cystoscopy: 5/2019 dilation of urethra  H/O exploratory laparotomy: lysis of adhesions, biopsy 5/2019  Bowel obstruction: colostomy placement 1/2019  H/O prostatectomy: 2001  H/O partial nephrectomy: left, 2000- benign tumor    Allergies    latex (Rash)  No Known Drug Allergies    Intolerances    ANTIMICROBIALS:  cefuroxime   Tablet 500 every 12 hours  hydroxychloroquine    hydroxychloroquine 400 every 24 hours  metroNIDAZOLE    Tablet 500 every 12 hours    OTHER MEDS:  acetaminophen   Tablet .. 650 milliGRAM(s) Oral every 6 hours PRN  ALBUTerol    90 MICROgram(s) HFA Inhaler 2 Puff(s) Inhalation every 6 hours  aspirin enteric coated 81 milliGRAM(s) Oral daily  benzonatate 100 milliGRAM(s) Oral three times a day PRN  dextrose 40% Gel 15 Gram(s) Oral once PRN  dextrose 5%. 1000 milliLiter(s) IV Continuous <Continuous>  dextrose 50% Injectable 12.5 Gram(s) IV Push once  dextrose 50% Injectable 25 Gram(s) IV Push once  dextrose 50% Injectable 25 Gram(s) IV Push once  enoxaparin Injectable 40 milliGRAM(s) SubCutaneous daily  glucagon  Injectable 1 milliGRAM(s) IntraMuscular once PRN  guaifenesin/dextromethorphan  Syrup 10 milliLiter(s) Oral every 6 hours PRN  insulin lispro (HumaLOG) corrective regimen sliding scale   SubCutaneous Before meals and at bedtime  sodium chloride 2 Gram(s) Oral three times a day    SOCIAL HISTORY: No tobacco, no alcohol, no illicit drugs    FAMILY HISTORY:  FH: ovarian cancer: sister  FH: kidney cancer: brother    Drug Dosing Weight  Height (cm): 154.9 (18 Apr 2020 21:15)  Weight (kg): 52.617 (18 Apr 2020 21:15)  BMI (kg/m2): 21.9 (18 Apr 2020 21:15)  BSA (m2): 1.5 (18 Apr 2020 21:15)    PE:    Vital Signs Last 24 Hrs  T(C): 36.8 (20 Apr 2020 05:11), Max: 36.8 (19 Apr 2020 19:44)  T(F): 98.3 (20 Apr 2020 05:11), Max: 98.3 (19 Apr 2020 19:44)  HR: 96 (20 Apr 2020 05:11) (88 - 96)  BP: 154/96 (20 Apr 2020 05:11) (133/82 - 154/96)  RR: 28 (20 Apr 2020 05:11) (20 - 28)  SpO2: 94% (20 Apr 2020 05:11) (94% - 98%)    Gen: AOx3, NAD, fatigued  CV: S1+S2 normal, nontachycardic  Resp: Clear bilat, no resp distress, no crackles/wheezes, nasal canula  Abd: Soft, nontender, +BS  Ext: No LE edema, no wounds  : No suprapubic tenderness  IV/Skin: No thrombophlebitis, no rash  Msk: No low back pain, no arthralgias, no joint swelling  Neuro: No sensory deficits, no motor deficits    LABS:                        11.4   6.17  )-----------( 347      ( 19 Apr 2020 06:03 )             33.9     04-19    132<L>  |  98  |  22  ----------------------------<  102<H>  3.9   |  20<L>  |  1.15    Ca    8.6      19 Apr 2020 06:05  Phos  3.1     04-19  Mg     2.0     04-19    TPro  6.9  /  Alb  3.1<L>  /  TBili  0.3  /  DBili  x   /  AST  39  /  ALT  26  /  AlkPhos  71  04-18    MICROBIOLOGY:    .Blood Blood-Venous  04-03-20   No Growth Final    RADIOLOGY:    4/18 CXR:    FINDINGS:  Patchy/hazy bilateral opacities.  No pleural effusion or pneumothorax.  Cardiac size is not accurately evaluated in this projection.  Right-sided Mediport with the tip in the SVC.    IMPRESSION:  Patchy/hazy bilateral opacities. Pattern suggests infection including atypical pneumonia/viral infection from atypical agents including COVID-19.    4/2 CT:    IMPRESSION:     Profound rectal and distal sigmoid wall edema, masslike at the sigmoid with ill-defined pericolic soft tissue, markedly increased from 12/13/2019, suspicious for progression of disease with concern for superimposed proctocolitis. The pericolic soft tissue is inseparable from the bladder dome and bladder wall involvement cannot be excluded. In the low rectum, there is a 2.8 cm ill-defined fluid collection, likely corresponding to the report of purulent mucinous drainage.   Also, new extensive conglomerate and centrally necrotic retroperitoneal lymphadenopathy, reflecting progression of disease. Moderate right hydroureteronephrosis to the level of the necrotic lymphadenopathy.    Trace bilateral pleural effusions with bibasilar subsegmental atelectasis. A new 3 mm right middle lobe subpleural nodule.

## 2020-04-20 NOTE — PROGRESS NOTE ADULT - PROBLEM SELECTOR PLAN 2
?Abscess/colitis, Last admission pt found to have possible proctocolitis and collection on CT. Was seen by surgery who did not recommend any acute surgical intervention.  -Following with Dr Schmitt outpt last day of abx (Ceftin/Flagyl) was planned to be 4/20 when pt was going to get repeat CT A/P. c/w ceftin/flagyl for now  -dw Dr. Schmitt - f/u CT a/p

## 2020-04-20 NOTE — PROGRESS NOTE ADULT - PROBLEM SELECTOR PLAN 7
Discussed goals of care with pt as there is COVID-19 infx in addition to other morbidities such as infection, cancer, currently not being treated. Pt does not want resuscitation, and would like to be DNR/DNI, but would like to discuss with eldest son, Orlin helton.

## 2020-04-20 NOTE — PROGRESS NOTE ADULT - PROBLEM SELECTOR PLAN 1
Desaturating to 88% on RA per ED, CXR with bilateral patchy opacities, COVID+  - started Plaquenil  -C/w supplemental O2 via NC w/goal SpO2 of 92-96%  -Supportive care with Tylenol, Albuterol HFA and cough syrup

## 2020-04-20 NOTE — PROGRESS NOTE ADULT - SUBJECTIVE AND OBJECTIVE BOX
Patient is a 75y old  Male who presents with a chief complaint of cough (20 Apr 2020 11:12)      SUBJECTIVE / OVERNIGHT EVENTS: denies SOB, requiring 2L NC. denies abd pain, remains afebrile, no GI symptoms     ROS:  No HA/DZ  No Vision changes   No CP, SOB  No N/V/D  No Edema  No Rash  NO weakness, numbness    MEDICATIONS  (STANDING):  ALBUTerol    90 MICROgram(s) HFA Inhaler 2 Puff(s) Inhalation every 6 hours  aspirin enteric coated 81 milliGRAM(s) Oral daily  cefuroxime   Tablet 500 milliGRAM(s) Oral every 12 hours  dextrose 5%. 1000 milliLiter(s) (50 mL/Hr) IV Continuous <Continuous>  dextrose 50% Injectable 12.5 Gram(s) IV Push once  dextrose 50% Injectable 25 Gram(s) IV Push once  dextrose 50% Injectable 25 Gram(s) IV Push once  enoxaparin Injectable 40 milliGRAM(s) SubCutaneous daily  hydroxychloroquine   Oral   hydroxychloroquine 400 milliGRAM(s) Oral every 24 hours  insulin lispro (HumaLOG) corrective regimen sliding scale   SubCutaneous Before meals and at bedtime  metroNIDAZOLE    Tablet 500 milliGRAM(s) Oral every 12 hours  sodium chloride 2 Gram(s) Oral three times a day    MEDICATIONS  (PRN):  acetaminophen   Tablet .. 650 milliGRAM(s) Oral every 6 hours PRN Temp greater or equal to 38.5C (101.3F)  benzonatate 100 milliGRAM(s) Oral three times a day PRN Cough  dextrose 40% Gel 15 Gram(s) Oral once PRN Blood Glucose LESS THAN 70 milliGRAM(s)/deciliter  glucagon  Injectable 1 milliGRAM(s) IntraMuscular once PRN Glucose LESS THAN 70 milligrams/deciliter  guaifenesin/dextromethorphan  Syrup 10 milliLiter(s) Oral every 6 hours PRN Cough      T(C): 36.8 (04-20-20 @ 05:11)  HR: 96 (04-20-20 @ 05:11)  BP: 154/96 (04-20-20 @ 05:11)  RR: 28 (04-20-20 @ 05:11)  SpO2: 94% (04-20-20 @ 05:11)  CAPILLARY BLOOD GLUCOSE      POCT Blood Glucose.: 75 mg/dL (20 Apr 2020 08:20)  POCT Blood Glucose.: 78 mg/dL (19 Apr 2020 21:29)  POCT Blood Glucose.: 93 mg/dL (19 Apr 2020 16:58)  POCT Blood Glucose.: 99 mg/dL (19 Apr 2020 12:25)    I&O's Summary      PHYSICAL EXAM:  GENERAL: NAD, ill appearing   HEAD:  Atraumatic, Normocephalic  EYES: EOMI, PERRL, conjunctiva and sclera clear  NECK: Supple, No JVD  CHEST/LUNG: Clear to auscultation bilaterally  HEART: Regular rate and rhythm; No murmurs, rubs, or gallops, No Edema  ABDOMEN: Soft, Nontender, Nondistended; Bowel sounds present  EXTREMITIES:  2+ Peripheral Pulses, No clubbing, cyanosis  PSYCH: No SI/HI  NEUROLOGY: non-focal  SKIN: No rashes or lesions    LABS:                        11.4   6.17  )-----------( 347      ( 19 Apr 2020 06:03 )             33.9     04-19    132<L>  |  98  |  22  ----------------------------<  102<H>  3.9   |  20<L>  |  1.15    Ca    8.6      19 Apr 2020 06:05  Phos  3.1     04-19  Mg     2.0     04-19    TPro  6.9  /  Alb  3.1<L>  /  TBili  0.3  /  DBili  x   /  AST  39  /  ALT  26  /  AlkPhos  71  04-18                  RADIOLOGY & ADDITIONAL TESTS:    Imaging Personally Reviewed:    Consultant(s) Notes Reviewed:      Care Discussed with Consultants/Other Providers:

## 2020-04-20 NOTE — CONSULT NOTE ADULT - ASSESSMENT
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

## 2020-04-20 NOTE — PROVIDER CONTACT NOTE (OTHER) - ASSESSMENT
Patient's BP is 154/96, and HR 96. His BP has been high lately, but this is the highest it's been. Patient is asymptomatic and not in any distress

## 2020-04-21 NOTE — PROGRESS NOTE ADULT - PROBLEM SELECTOR PLAN 2
?Abscess/colitis, Last admission pt found to have possible proctocolitis and collection on CT. Was seen by surgery who did not recommend any acute surgical intervention.  -Following with Dr Oskar garridopt last day of abx (Ceftin/Flagyl) was planned to be 4/20 when pt was going to get repeat CT A/P.   c/w ceftin/flagyl for now  CT a/p reports mild inc in size of collection abscess vs necrotic dz

## 2020-04-21 NOTE — PROGRESS NOTE ADULT - ASSESSMENT
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) with recent admission 4/3 for proctocolitis p/w weakness and hyponatremia  No fever, no leukocytosis  CT A/P prior with abscess and possible proctocolitis  CXR with opacities suspicious for COVID  COVID PCR positive  Overall,  1) COVID+  - Plaquenil 5 days then DC  - O2 supplementation per primary team  - Trend ferritin, CRP, procalcitonin  2) Colitis/abscess  - Note CT with slightly increasing area of fluid collection, persistent findings of disease--suspect malignancy > infection considering poor response to abx  - Would DC Ceftin/Flagyl, monitor off abx  3) Fatigue  - Monitor for improvement    Jass Schmitt MD  Pager [phone]  After 5pm and on weekends call [phone]

## 2020-04-21 NOTE — PROGRESS NOTE ADULT - PROBLEM SELECTOR PLAN 7
Discussed goals of care with pt as there is COVID-19 infx in addition to other morbidities such as infection, cancer, currently not being treated. Pt does not want resuscitation, and would like to be DNR/DNI

## 2020-04-21 NOTE — PROGRESS NOTE ADULT - SUBJECTIVE AND OBJECTIVE BOX
CC: F/U for COVID    Saw/spoke to patient. No fevers, no chills. No new complaints. Feels improved.    Allergies  latex (Rash)  No Known Drug Allergies    ANTIMICROBIALS:  cefuroxime   Tablet 500 every 12 hours  hydroxychloroquine    hydroxychloroquine 400 every 24 hours  metroNIDAZOLE    Tablet 500 every 12 hours    PE:    Vital Signs Last 24 Hrs  T(C): 36.6 (21 Apr 2020 05:51), Max: 36.9 (20 Apr 2020 20:29)  T(F): 97.9 (21 Apr 2020 05:51), Max: 98.4 (20 Apr 2020 20:29)  HR: 98 (21 Apr 2020 05:51) (94 - 98)  BP: 157/101 (21 Apr 2020 05:51) (153/96 - 157/101)  RR: 24 (21 Apr 2020 05:51) (23 - 24)  SpO2: 93% (21 Apr 2020 05:51) (93% - 97%)    Gen: AOx3, NAD, non-toxic  CV: S1+S2 normal, nontachycardic  Resp: Clear bilat, no resp distress, no crackles/wheezes, NC 4L  Abd: Soft, nontender, +BS  Ext: No LE edema, no wounds    LABS:                        11.4   6.57  )-----------( 355      ( 21 Apr 2020 07:08 )             34.8     04-21    136  |  101  |  17  ----------------------------<  112<H>  2.9<LL>   |  23  |  0.90    Ca    8.7      21 Apr 2020 07:08    TPro  6.9  /  Alb  2.6<L>  /  TBili  0.3  /  DBili  x   /  AST  31  /  ALT  19  /  AlkPhos  60  04-21    MICROBIOLOGY:    .Blood Blood-Venous  04-03-20   No Growth Final    RADIOLOGY:    CT 4/20:    IMPRESSION:     Redemonstration of extensive disease involving the rectosigmoid colon which is inseparable from the urinary bladder. Hypoattenuating collection within the rectum measuring 3.3 cm is slightly increased in size compared to the prior exam 4/2/2020 and may represents necrotic disease versus abscess.    Significant retroperitoneal lymphadenopathy with unchanged moderate right hydroureteronephrosis.    Trace bilateral pleural effusions and bibasilar atelectasis. New bilateral groundglass and patchy opacities in keeping with patient's positive COVID status.

## 2020-04-21 NOTE — PROGRESS NOTE ADULT - SUBJECTIVE AND OBJECTIVE BOX
[facility] Division of Hospital Medicine  Korin Gloria MD  Pager [phone]    Patient is a 75y old  Male who presents with a chief complaint of cough       SUBJECTIVE / OVERNIGHT EVENTS: reports feeling better and wanted to eat breakfast; denies abd pain      MEDICATIONS  (STANDING):  ALBUTerol    90 MICROgram(s) HFA Inhaler 2 Puff(s) Inhalation every 6 hours  aspirin enteric coated 81 milliGRAM(s) Oral daily  cefuroxime   Tablet 500 milliGRAM(s) Oral every 12 hours  dextrose 5%. 1000 milliLiter(s) (50 mL/Hr) IV Continuous <Continuous>  dextrose 50% Injectable 12.5 Gram(s) IV Push once  dextrose 50% Injectable 25 Gram(s) IV Push once  dextrose 50% Injectable 25 Gram(s) IV Push once  enoxaparin Injectable 40 milliGRAM(s) SubCutaneous daily  hydroxychloroquine   Oral   hydroxychloroquine 400 milliGRAM(s) Oral every 24 hours  insulin lispro (HumaLOG) corrective regimen sliding scale   SubCutaneous Before meals and at bedtime  metroNIDAZOLE    Tablet 500 milliGRAM(s) Oral every 12 hours  potassium chloride   Powder 40 milliEquivalent(s) Oral every 4 hours  sodium chloride 2 Gram(s) Oral two times a day    MEDICATIONS  (PRN):  acetaminophen   Tablet .. 650 milliGRAM(s) Oral every 6 hours PRN Temp greater or equal to 38.5C (101.3F)  benzonatate 100 milliGRAM(s) Oral three times a day PRN Cough  dextrose 40% Gel 15 Gram(s) Oral once PRN Blood Glucose LESS THAN 70 milliGRAM(s)/deciliter  glucagon  Injectable 1 milliGRAM(s) IntraMuscular once PRN Glucose LESS THAN 70 milligrams/deciliter  guaifenesin/dextromethorphan  Syrup 10 milliLiter(s) Oral every 6 hours PRN Cough      CAPILLARY BLOOD GLUCOSE  POCT Blood Glucose.: 111 mg/dL (21 Apr 2020 12:16)  POCT Blood Glucose.: 93 mg/dL (21 Apr 2020 08:30)  POCT Blood Glucose.: 91 mg/dL (20 Apr 2020 21:18)  POCT Blood Glucose.: 102 mg/dL (20 Apr 2020 17:04)        PHYSICAL EXAM:  Vital Signs Last 24 Hrs  T(F): 97.9 (21 Apr 2020 05:51), Max: 98.4 (20 Apr 2020 20:29)  HR: 98 (21 Apr 2020 05:51) (94 - 104)  BP: 157/101 (21 Apr 2020 05:51) (153/96 - 157/101)  RR: 24 (21 Apr 2020 05:51) (23 - 24)  SpO2: 93% (21 Apr 2020 05:51) (93% - 97%)    CONSTITUTIONAL: NAD  ENMT: Moist oral mucosa  RESPIRATORY: Normal respiratory effort;  MUSCULOSKELETAL: no clubbing or cyanosis of digits; no joint swelling or tenderness to palpation  PSYCH: affect appropriate  NEUROLOGY: no gross sensory deficits       LABS:                        11.4   6.57  )-----------( 355      ( 21 Apr 2020 07:08 )             34.8     04-21    136  |  101  |  17  ----------------------------<  112<H>  2.9<LL>   |  23  |  0.90    Ca    8.7      21 Apr 2020 07:08    TPro  6.9  /  Alb  2.6<L>  /  TBili  0.3  /  DBili  x   /  AST  31  /  ALT  19  /  AlkPhos  60  04-21

## 2020-04-22 NOTE — PROGRESS NOTE ADULT - ASSESSMENT
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) with recent admission 4/3 for proctocolitis p/w weakness and hyponatremia  No fever, no leukocytosis  CT A/P prior with abscess and possible proctocolitis  CXR with opacities suspicious for COVID  COVID PCR positive  Overall,  1) COVID+  - Plaquenil 5 days then DC  - O2 supplementation per primary team  - Trend ferritin, CRP, procalcitonin  2) Colitis/abscess  - Note CT with slightly increasing area of fluid collection, persistent findings of disease--suspect malignancy > infection considering poor response to abx  - Monitor off abx, low suspicion bacterial infection presently  3) Fatigue  - Monitor for improvement    Jass Schmitt MD  Pager [phone]  After 5pm and on weekends call [phone]

## 2020-04-22 NOTE — PROGRESS NOTE ADULT - SUBJECTIVE AND OBJECTIVE BOX
[facility] Division of Hospital Medicine  Korin Gloria MD  Pager [phone]    Patient is a 75y old  Male who presents with a chief complaint of cough      SUBJECTIVE / OVERNIGHT EVENTS: pt resting comfortably upon my arrival      MEDICATIONS  (STANDING):  ALBUTerol    90 MICROgram(s) HFA Inhaler 2 Puff(s) Inhalation every 6 hours  aspirin enteric coated 81 milliGRAM(s) Oral daily  dextrose 5%. 1000 milliLiter(s) (50 mL/Hr) IV Continuous <Continuous>  dextrose 50% Injectable 12.5 Gram(s) IV Push once  dextrose 50% Injectable 25 Gram(s) IV Push once  dextrose 50% Injectable 25 Gram(s) IV Push once  enoxaparin Injectable 40 milliGRAM(s) SubCutaneous daily  hydroxychloroquine   Oral   hydroxychloroquine 400 milliGRAM(s) Oral every 24 hours  insulin lispro (HumaLOG) corrective regimen sliding scale   SubCutaneous Before meals and at bedtime  potassium acid phosphate/sodium acid phosphate tablet (K-PHOS No. 2) 1 Tablet(s) Oral three times a day with meals  sodium chloride 2 Gram(s) Oral two times a day    MEDICATIONS  (PRN):  acetaminophen   Tablet .. 650 milliGRAM(s) Oral every 6 hours PRN Temp greater or equal to 38.5C (101.3F)  benzonatate 100 milliGRAM(s) Oral three times a day PRN Cough  dextrose 40% Gel 15 Gram(s) Oral once PRN Blood Glucose LESS THAN 70 milliGRAM(s)/deciliter  glucagon  Injectable 1 milliGRAM(s) IntraMuscular once PRN Glucose LESS THAN 70 milligrams/deciliter  guaifenesin/dextromethorphan  Syrup 10 milliLiter(s) Oral every 6 hours PRN Cough      CAPILLARY BLOOD GLUCOSE  POCT Blood Glucose.: 85 mg/dL (22 Apr 2020 08:17)  POCT Blood Glucose.: 88 mg/dL (21 Apr 2020 22:36)  POCT Blood Glucose.: 101 mg/dL (21 Apr 2020 17:05)  POCT Blood Glucose.: 111 mg/dL (21 Apr 2020 12:16)      PHYSICAL EXAM:  Vital Signs Last 24 Hrs  T(F): 98.5 (22 Apr 2020 05:39), Max: 98.9 (21 Apr 2020 15:22)  HR: 101 (22 Apr 2020 05:39) (99 - 105)  BP: 151/91 (22 Apr 2020 05:39) (150/87 - 156/90)  RR: 23 (22 Apr 2020 05:39) (23 - 24)  SpO2: 93% (22 Apr 2020 05:39) (93% - 97%)    CONSTITUTIONAL: NAD  RESPIRATORY: Normal respiratory effort;  ABDOMEN: + ostomy with brown stool, non tender  MUSCULOSKELETAL:  no clubbing or cyanosis of digits; no joint swelling or tenderness to palpation  PSYCH: affect appropriate  NEUROLOGY: no gross sensory deficits       LABS:                 04-22    135  |  102  |  19  ----------------------------<  99  4.9   |  22  |  0.94    Ca    9.2      22 Apr 2020 06:00  Phos  2.0     04-22  Mg     2.2     04-22

## 2020-04-22 NOTE — PROGRESS NOTE ADULT - SUBJECTIVE AND OBJECTIVE BOX
CC: F/U for COVID    Saw/spoke to patient. Patient still on NC O2. Otherwise appears well.    Allergies  latex (Rash)  No Known Drug Allergies    ANTIMICROBIALS:  hydroxychloroquine    hydroxychloroquine 400 every 24 hours    PE:    Vital Signs Last 24 Hrs  T(C): 36.8 (22 Apr 2020 11:55), Max: 37.2 (21 Apr 2020 15:22)  T(F): 98.3 (22 Apr 2020 11:55), Max: 98.9 (21 Apr 2020 15:22)  HR: 104 (22 Apr 2020 11:55) (99 - 105)  BP: 160/95 (22 Apr 2020 11:55) (150/87 - 160/95)  RR: 22 (22 Apr 2020 11:55) (22 - 24)  SpO2: 95% (22 Apr 2020 11:55) (93% - 97%)    Gen: AOx3, NAD, non-toxic  CV: S1+S2 normal, tachycardic  Resp: Clear bilat, no resp distress, no crackles/wheezes, NC O2 4L  Abd: Soft, nontender, +BS  Ext: No LE edema, no wounds    LABS:                        11.4   6.57  )-----------( 355      ( 21 Apr 2020 07:08 )             34.8     04-22    135  |  102  |  19  ----------------------------<  99  4.9   |  22  |  0.94    Ca    9.2      22 Apr 2020 06:00  Phos  2.0     04-22  Mg     2.2     04-22    TPro  6.9  /  Alb  2.6<L>  /  TBili  0.3  /  DBili  x   /  AST  31  /  ALT  19  /  AlkPhos  60  04-21    MICROBIOLOGY:    .Blood Blood-Venous  04-03-20   No Growth Final    RADIOLOGY:    4/20 CT:    IMPRESSION:     Redemonstration of extensive disease involving the rectosigmoid colon which is inseparable from the urinary bladder. Hypoattenuating collection within the rectum measuring 3.3 cm is slightly increased in size compared to the prior exam 4/2/2020 and may represents necrotic disease versus abscess.    Significant retroperitoneal lymphadenopathy with unchanged moderate right hydroureteronephrosis.    Trace bilateral pleural effusions and bibasilar atelectasis. New bilateral groundglass and patchy opacities in keeping with patient's positive COVID status.

## 2020-04-22 NOTE — PROVIDER CONTACT NOTE (OTHER) - ACTION/TREATMENT ORDERED:
MD aware. No interventions needed at this time. Continue to monitor. Contact provider if patient becomes symptomatic.

## 2020-04-22 NOTE — PROGRESS NOTE ADULT - ATTENDING COMMENTS
will d/w onc for poss of hospice as pt is now DNR, which will allow pt to be home with family and cont symptomatic treatment
d/w jonathon Ordaz who supports pt's decision, verbal consent obtained from patient for DNR/I  an additional 20 minutes spent in advance care planning.

## 2020-04-22 NOTE — PROGRESS NOTE ADULT - PROBLEM SELECTOR PLAN 2
CT a/p reports mild inc in size of collection abscess vs necrotic dz  apprec ID f/u  dc abx as no improvement in collection after rx with abx; suggests likely malignancy related more than infectious

## 2020-04-23 NOTE — PROGRESS NOTE ADULT - ASSESSMENT
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) with recent admission 4/3 for proctocolitis p/w weakness and hyponatremia  No fever, no leukocytosis  CT A/P prior with abscess and possible proctocolitis  CXR with opacities suspicious for COVID  COVID PCR positive  Overall,  1) COVID+  - Plaquenil 5 days then DC  - O2 supplementation per primary team  - Trend ferritin, CRP, procalcitonin  2) Colitis/abscess vs malignancy  - Note CT with slightly increasing area of fluid collection, persistent findings of disease--suspect malignancy > infection considering poor response to abx  - Monitor off abx, low suspicion bacterial infection presently  - Timing chemotherapy per heme onc  3) Fatigue  - Monitor for improvement    Jass Schmitt MD  Pager [phone]  After 5pm and on weekends call [phone]

## 2020-04-23 NOTE — PROGRESS NOTE ADULT - PROBLEM SELECTOR PLAN 3
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

## 2020-04-23 NOTE — PROVIDER CONTACT NOTE (OTHER) - SITUATION
Patient upset this am, refusing medications says he wants to be discharged and will sign out by 12pm if he is not discharged

## 2020-04-23 NOTE — PROGRESS NOTE ADULT - PROBLEM SELECTOR PLAN 7
Discussed goals of care with pt as there is COVID-19 infx in addition to other morbidities such as infection, cancer, currently not being treated. Pt does not want resuscitation, and would like to be DNR/DNI; however pt still wishing for treatment so will not pursue home hospice at this time

## 2020-04-23 NOTE — PROGRESS NOTE ADULT - PROBLEM SELECTOR PLAN 1
Desaturating to 88% on RA per ED,   CXR with bilateral patchy opacities, COVID+  completing Plaquenil today  on RA, check pulse ox with ambulation  -Supportive care with Tylenol, Albuterol HFA and cough syrup

## 2020-04-23 NOTE — PROGRESS NOTE ADULT - SUBJECTIVE AND OBJECTIVE BOX
[facility] Division of Hospital Medicine  Korin Gloria MD  Pager [phone]    Patient is a 75y old  Male who presents with a chief complaint of cough       SUBJECTIVE / OVERNIGHT EVENTS: asking to go home; on RA, appears in NAD      MEDICATIONS  (STANDING):  ALBUTerol    90 MICROgram(s) HFA Inhaler 2 Puff(s) Inhalation every 6 hours  aspirin enteric coated 81 milliGRAM(s) Oral daily  dextrose 5%. 1000 milliLiter(s) (50 mL/Hr) IV Continuous <Continuous>  dextrose 50% Injectable 12.5 Gram(s) IV Push once  dextrose 50% Injectable 25 Gram(s) IV Push once  dextrose 50% Injectable 25 Gram(s) IV Push once  enoxaparin Injectable 40 milliGRAM(s) SubCutaneous daily  hydroxychloroquine   Oral   hydroxychloroquine 400 milliGRAM(s) Oral every 24 hours  insulin lispro (HumaLOG) corrective regimen sliding scale   SubCutaneous Before meals and at bedtime  potassium phosphate / sodium phosphate powder 1 Packet(s) Oral three times a day  sodium chloride 2 Gram(s) Oral two times a day    MEDICATIONS  (PRN):  acetaminophen   Tablet .. 650 milliGRAM(s) Oral every 6 hours PRN Temp greater or equal to 38.5C (101.3F)  benzonatate 100 milliGRAM(s) Oral three times a day PRN Cough  dextrose 40% Gel 15 Gram(s) Oral once PRN Blood Glucose LESS THAN 70 milliGRAM(s)/deciliter  glucagon  Injectable 1 milliGRAM(s) IntraMuscular once PRN Glucose LESS THAN 70 milligrams/deciliter  guaifenesin/dextromethorphan  Syrup 10 milliLiter(s) Oral every 6 hours PRN Cough      CAPILLARY BLOOD GLUCOSE  POCT Blood Glucose.: 78 mg/dL (23 Apr 2020 08:35)  POCT Blood Glucose.: 100 mg/dL (22 Apr 2020 22:00)  POCT Blood Glucose.: 89 mg/dL (22 Apr 2020 17:10)  POCT Blood Glucose.: 103 mg/dL (22 Apr 2020 12:18)      PHYSICAL EXAM:  Vital Signs Last 24 Hrs  T(F): 97.8 (23 Apr 2020 04:13), Max: 98.9 (22 Apr 2020 21:30)  HR: 100 (23 Apr 2020 04:13) (99 - 104)  BP: 151/100 (23 Apr 2020 04:13) (145/100 - 160/95)  RR: 22 (23 Apr 2020 05:45) (21 - 24)  SpO2: 90% (23 Apr 2020 05:45) (90% - 97%)    CONSTITUTIONAL: NAD  ENMT: dry MM  RESPIRATORY: Normal respiratory effort;  ABDOMEN: +stoma, stool in bag  MUSCULOSKELETAL:  no clubbing or cyanosis of digits; no joint swelling or tenderness to palpation  PSYCH: affect appropriate  NEUROLOGY: no gross sensory deficits       LABS:                        11.3   7.64  )-----------( 449      ( 23 Apr 2020 06:13 )             34.6     04-23    136  |  102  |  24<H>  ----------------------------<  87  3.8   |  18<L>  |  0.94    Ca    9.3      23 Apr 2020 06:13  Phos  3.0     04-23  Mg     1.9     04-23

## 2020-04-23 NOTE — PROGRESS NOTE ADULT - SUBJECTIVE AND OBJECTIVE BOX
CC: F/U for COVID    Saw/spoke to patient. No fevers, no chills. No new complaints. Asking to go home.    Allergies  latex (Rash)  No Known Drug Allergies    ANTIMICROBIALS:  hydroxychloroquine    hydroxychloroquine 400 every 24 hours    PE:    Vital Signs Last 24 Hrs  T(C): 36.7 (23 Apr 2020 13:36), Max: 37.2 (22 Apr 2020 21:30)  T(F): 98 (23 Apr 2020 13:36), Max: 98.9 (22 Apr 2020 21:30)  HR: 101 (23 Apr 2020 13:36) (99 - 103)  BP: 141/88 (23 Apr 2020 13:36) (141/88 - 156/100)  RR: 20 (23 Apr 2020 13:36) (20 - 24)  SpO2: 96% (23 Apr 2020 13:36) (90% - 97%)    Gen: AOx3, NAD, non-toxic  CV: S1+S2 normal, tachycardic  Resp: Clear bilat, no resp distress, no crackles/wheezes  Abd: Soft, nontender, +BS  Ext: No LE edema, no wounds    LABS:                        11.3   7.64  )-----------( 449      ( 23 Apr 2020 06:13 )             34.6     04-23    136  |  102  |  24<H>  ----------------------------<  87  3.8   |  18<L>  |  0.94    Ca    9.3      23 Apr 2020 06:13  Phos  3.0     04-23  Mg     1.9     04-23    MICROBIOLOGY:    .Blood Blood-Venous  04-03-20   No Growth Final    COVID+    RADIOLOGY:    4/20 CT:    IMPRESSION:     Redemonstration of extensive disease involving the rectosigmoid colon which is inseparable from the urinary bladder. Hypoattenuating collection within the rectum measuring 3.3 cm is slightly increased in size compared to the prior exam 4/2/2020 and may represents necrotic disease versus abscess.    Significant retroperitoneal lymphadenopathy with unchanged moderate right hydroureteronephrosis.    Trace bilateral pleural effusions and bibasilar atelectasis. New bilateral groundglass and patchy opacities in keeping with patient's positive COVID status.

## 2020-04-24 NOTE — PHYSICAL THERAPY INITIAL EVALUATION ADULT - PERTINENT HX OF CURRENT PROBLEM, REHAB EVAL
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

## 2020-04-24 NOTE — PHYSICAL THERAPY INITIAL EVALUATION ADULT - ADDITIONAL COMMENTS
Pt lives with his 2 sons in private home, there are 2 steps to enter and flight to bedroom/bathroom. Pt states his son was helping bath and dress him prior to admission.

## 2020-04-24 NOTE — PROGRESS NOTE ADULT - SUBJECTIVE AND OBJECTIVE BOX
CC: F/U for COVID    Saw/spoke to patient. No fevers, no chills. No new complaints.    Allergies  latex (Rash)  No Known Drug Allergies    ANTIMICROBIALS:  Off    PE:    Vital Signs Last 24 Hrs  T(C): 36.7 (24 Apr 2020 11:36), Max: 36.8 (23 Apr 2020 21:38)  T(F): 98.1 (24 Apr 2020 11:36), Max: 98.2 (23 Apr 2020 21:38)  HR: 100 (24 Apr 2020 12:45) (93 - 102)  BP: 143/83 (24 Apr 2020 12:45) (141/88 - 160/89)  RR: 20 (24 Apr 2020 12:45) (20 - 24)  SpO2: 88% (24 Apr 2020 12:45) (88% - 96%)    Gen: AOx3, NAD, non-toxic, pleasant  CV: S1+S2 normal, nontachycardic  Resp: Clear bilat, no resp distress, no crackles/wheezes, NC O2 4L  Abd: Soft, nontender, +BS  Ext: No LE edema, no wounds    LABS:                        11.3   7.64  )-----------( 449      ( 23 Apr 2020 06:13 )             34.6     04-23    136  |  102  |  24<H>  ----------------------------<  87  3.8   |  18<L>  |  0.94    Ca    9.3      23 Apr 2020 06:13  Phos  3.0     04-23  Mg     1.9     04-23    MICROBIOLOGY:    .Blood Blood-Venous  04-03-20   No Growth Final    RADIOLOGY:    4/20 CT:    IMPRESSION:     Redemonstration of extensive disease involving the rectosigmoid colon which is inseparable from the urinary bladder. Hypoattenuating collection within the rectum measuring 3.3 cm is slightly increased in size compared to the prior exam 4/2/2020 and may represents necrotic disease versus abscess.    Significant retroperitoneal lymphadenopathy with unchanged moderate right hydroureteronephrosis.    Trace bilateral pleural effusions and bibasilar atelectasis. New bilateral groundglass and patchy opacities in keeping with patient's positive COVID status.

## 2020-04-24 NOTE — PROGRESS NOTE ADULT - PROBLEM SELECTOR PLAN 1
Desaturating to 88% on RA per ED,   CXR with bilateral patchy opacities, COVID+  completed Plaquenil today  requiring O2 at rest again   -Supportive care with Tylenol, Albuterol HFA and cough syrup

## 2020-04-24 NOTE — PROGRESS NOTE ADULT - SUBJECTIVE AND OBJECTIVE BOX
Patient is a 75y old  Male who presents with a chief complaint of cough (23 Apr 2020 10:06)      SUBJECTIVE / OVERNIGHT EVENTS: was transiently on Ra yesterday but then desated and now on 4L - agreeable to rehab     ROS:  No HA/DZ  No Vision changes   No CP  No N/V/D  No Edema  No Rash  NO weakness, numbness    MEDICATIONS  (STANDING):  ALBUTerol    90 MICROgram(s) HFA Inhaler 2 Puff(s) Inhalation every 6 hours  aspirin enteric coated 81 milliGRAM(s) Oral daily  dextrose 5%. 1000 milliLiter(s) (50 mL/Hr) IV Continuous <Continuous>  dextrose 50% Injectable 12.5 Gram(s) IV Push once  dextrose 50% Injectable 25 Gram(s) IV Push once  dextrose 50% Injectable 25 Gram(s) IV Push once  enoxaparin Injectable 40 milliGRAM(s) SubCutaneous daily  insulin lispro (HumaLOG) corrective regimen sliding scale   SubCutaneous Before meals and at bedtime  sodium chloride 2 Gram(s) Oral two times a day    MEDICATIONS  (PRN):  acetaminophen   Tablet .. 650 milliGRAM(s) Oral every 6 hours PRN Temp greater or equal to 38.5C (101.3F)  benzonatate 100 milliGRAM(s) Oral three times a day PRN Cough  dextrose 40% Gel 15 Gram(s) Oral once PRN Blood Glucose LESS THAN 70 milliGRAM(s)/deciliter  glucagon  Injectable 1 milliGRAM(s) IntraMuscular once PRN Glucose LESS THAN 70 milligrams/deciliter  guaifenesin/dextromethorphan  Syrup 10 milliLiter(s) Oral every 6 hours PRN Cough      T(C): 36.5 (04-24-20 @ 05:51)  HR: 93 (04-24-20 @ 05:51)  BP: 150/84 (04-24-20 @ 05:51)  RR: 24 (04-24-20 @ 05:51)  SpO2: 93% (04-24-20 @ 05:51)  CAPILLARY BLOOD GLUCOSE      POCT Blood Glucose.: 78 mg/dL (24 Apr 2020 08:18)  POCT Blood Glucose.: 90 mg/dL (23 Apr 2020 22:09)  POCT Blood Glucose.: 92 mg/dL (23 Apr 2020 16:49)  POCT Blood Glucose.: 95 mg/dL (23 Apr 2020 12:18)    I&O's Summary      PHYSICAL EXAM:  GENERAL: NAD, well-developed, AOx3  HEAD:  Atraumatic, Normocephalic  EYES: EOMI, PERRL, conjunctiva and sclera clear  NECK: Supple, No JVD  CHEST/LUNG: Clear to auscultation bilaterally  HEART: Regular rate and rhythm; No murmurs, rubs, or gallops, No Edema  ABDOMEN: Soft, Nontender, Nondistended; Bowel sounds present  EXTREMITIES:  2+ Peripheral Pulses, No clubbing, cyanosis  PSYCH: No SI/HI  NEUROLOGY: non-focal  SKIN: No rashes or lesions    LABS:                        11.3   7.64  )-----------( 449      ( 23 Apr 2020 06:13 )             34.6     04-23    136  |  102  |  24<H>  ----------------------------<  87  3.8   |  18<L>  |  0.94    Ca    9.3      23 Apr 2020 06:13  Phos  3.0     04-23  Mg     1.9     04-23                    RADIOLOGY & ADDITIONAL TESTS:    Imaging Personally Reviewed:    Consultant(s) Notes Reviewed:      Care Discussed with Consultants/Other Providers:

## 2020-04-24 NOTE — PROGRESS NOTE ADULT - ASSESSMENT
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) with recent admission 4/3 for proctocolitis p/w weakness and hyponatremia  No fever, no leukocytosis  CT A/P prior with abscess and possible proctocolitis  CXR with opacities suspicious for COVID  COVID PCR positive  Overall,  1) COVID+  - S/p course plaquenil  - O2 supplementation per primary team  2) Colitis/abscess vs malignancy  - Note CT with slightly increasing area of fluid collection, persistent findings of disease--suspect malignancy > infection considering poor response to abx  - Monitor off abx, low suspicion bacterial infection presently  - Timing chemotherapy per heme onc  3) Fatigue  - Monitor for improvement    Signing off. Please call with further questions or change in status.    Jass Schmitt MD  Pager [phone]  After 5pm and on weekends call [phone]

## 2020-04-25 NOTE — PROGRESS NOTE ADULT - PROBLEM SELECTOR PLAN 1
Desaturating to 88% on RA when taken of O2, now requiring 4L  CXR with bilateral patchy opacities, COVID+  completed Plaquenil   requiring O2 at rest again, titrate as possible   -Supportive care with Tylenol, Albuterol HFA and cough syrup

## 2020-04-25 NOTE — PROGRESS NOTE ADULT - SUBJECTIVE AND OBJECTIVE BOX
Patient is a 75y old  Male who presents with a chief complaint of cough (24 Apr 2020 10:31)      SUBJECTIVE / OVERNIGHT EVENTS: is frustrated about still being hospitalized - Explained to him he still requires significant O2    ROS:  No HA/DZ  No Vision changes   No CP, SOB  No N/V/D  No Edema  No Rash  NO weakness, numbness    MEDICATIONS  (STANDING):  ALBUTerol    90 MICROgram(s) HFA Inhaler 2 Puff(s) Inhalation every 6 hours  aspirin enteric coated 81 milliGRAM(s) Oral daily  dextrose 5%. 1000 milliLiter(s) (50 mL/Hr) IV Continuous <Continuous>  dextrose 50% Injectable 12.5 Gram(s) IV Push once  dextrose 50% Injectable 25 Gram(s) IV Push once  dextrose 50% Injectable 25 Gram(s) IV Push once  enoxaparin Injectable 40 milliGRAM(s) SubCutaneous daily  insulin lispro (HumaLOG) corrective regimen sliding scale   SubCutaneous Before meals and at bedtime  sodium chloride 2 Gram(s) Oral two times a day    MEDICATIONS  (PRN):  acetaminophen   Tablet .. 650 milliGRAM(s) Oral every 6 hours PRN Temp greater or equal to 38.5C (101.3F)  benzonatate 100 milliGRAM(s) Oral three times a day PRN Cough  dextrose 40% Gel 15 Gram(s) Oral once PRN Blood Glucose LESS THAN 70 milliGRAM(s)/deciliter  glucagon  Injectable 1 milliGRAM(s) IntraMuscular once PRN Glucose LESS THAN 70 milligrams/deciliter  guaifenesin/dextromethorphan  Syrup 10 milliLiter(s) Oral every 6 hours PRN Cough      T(C): 36.3 (04-25-20 @ 05:40)  HR: 97 (04-25-20 @ 05:40)  BP: 159/87 (04-25-20 @ 05:40)  RR: 24 (04-25-20 @ 05:40)  SpO2: 96% (04-25-20 @ 05:40)  CAPILLARY BLOOD GLUCOSE      POCT Blood Glucose.: 99 mg/dL (25 Apr 2020 08:44)  POCT Blood Glucose.: 103 mg/dL (24 Apr 2020 21:49)  POCT Blood Glucose.: 83 mg/dL (24 Apr 2020 17:05)  POCT Blood Glucose.: 85 mg/dL (24 Apr 2020 12:21)    I&O's Summary      PHYSICAL EXAM:  GENERAL: NAD, well-developed, AOx3  HEAD:  Atraumatic, Normocephalic  EYES: EOMI, PERRL, conjunctiva and sclera clear  NECK: Supple, No JVD  CHEST/LUNG: Clear to auscultation bilaterally  HEART: Regular rate and rhythm; No murmurs, rubs, or gallops, No Edema  ABDOMEN: Soft, Nontender, Nondistended; Bowel sounds present  EXTREMITIES:  2+ Peripheral Pulses, No clubbing, cyanosis  PSYCH: No SI/HI  NEUROLOGY: non-focal  SKIN: No rashes or lesions    LABS:                        RADIOLOGY & ADDITIONAL TESTS:    Imaging Personally Reviewed:    Consultant(s) Notes Reviewed:      Care Discussed with Consultants/Other Providers:

## 2020-04-25 NOTE — PROGRESS NOTE ADULT - PROBLEM SELECTOR PLAN 7
Discussed goals of care with pt as there is COVID-19 infx in addition to other morbidities such as infection, cancer, currently not being treated. Pt does not want resuscitation, and would like to be DNR/DNI; however pt still wishing for treatment so will not pursue home hospice at this time Discussed goals of care with pt as there is COVID-19 infx in addition to other morbidities such as infection, cancer, currently not being treated. Pt does not want resuscitation, and would like to be DNR/DNI; however pt still wishing for treatment so will not pursue home hospice at this time  DW daughter - in agreement with plan

## 2020-04-26 NOTE — PROGRESS NOTE ADULT - PROBLEM/PLAN-6
DISPLAY PLAN FREE TEXT
Yue

## 2020-04-26 NOTE — PROGRESS NOTE ADULT - SUBJECTIVE AND OBJECTIVE BOX
Patient is a 75y old  Male who presents with a chief complaint of cough (25 Apr 2020 10:20)      SUBJECTIVE / OVERNIGHT EVENTS: no events - on 4L NC - DW nursing, will try to wean down to 2L - Pt anxious to go home     ROS:  No HA/DZ  No Vision changes   No CP, SOB  No N/V/D  No Edema  No Rash  NO weakness, numbness    MEDICATIONS  (STANDING):  ALBUTerol    90 MICROgram(s) HFA Inhaler 2 Puff(s) Inhalation every 6 hours  aspirin enteric coated 81 milliGRAM(s) Oral daily  dextrose 5%. 1000 milliLiter(s) (50 mL/Hr) IV Continuous <Continuous>  dextrose 50% Injectable 12.5 Gram(s) IV Push once  dextrose 50% Injectable 25 Gram(s) IV Push once  dextrose 50% Injectable 25 Gram(s) IV Push once  enoxaparin Injectable 40 milliGRAM(s) SubCutaneous daily  insulin lispro (HumaLOG) corrective regimen sliding scale   SubCutaneous Before meals and at bedtime  potassium chloride    Tablet ER 40 milliEquivalent(s) Oral every 4 hours  sodium chloride 2 Gram(s) Oral two times a day    MEDICATIONS  (PRN):  acetaminophen   Tablet .. 650 milliGRAM(s) Oral every 6 hours PRN Temp greater or equal to 38.5C (101.3F)  benzonatate 100 milliGRAM(s) Oral three times a day PRN Cough  dextrose 40% Gel 15 Gram(s) Oral once PRN Blood Glucose LESS THAN 70 milliGRAM(s)/deciliter  glucagon  Injectable 1 milliGRAM(s) IntraMuscular once PRN Glucose LESS THAN 70 milligrams/deciliter  guaifenesin/dextromethorphan  Syrup 10 milliLiter(s) Oral every 6 hours PRN Cough      T(C): 36.9 (04-26-20 @ 05:21)  HR: 93 (04-26-20 @ 05:21)  BP: 158/92 (04-26-20 @ 05:21)  RR: 22 (04-26-20 @ 05:21)  SpO2: 97% (04-26-20 @ 05:21)  CAPILLARY BLOOD GLUCOSE      POCT Blood Glucose.: 89 mg/dL (26 Apr 2020 08:57)  POCT Blood Glucose.: 92 mg/dL (25 Apr 2020 21:59)  POCT Blood Glucose.: 95 mg/dL (25 Apr 2020 17:06)  POCT Blood Glucose.: 96 mg/dL (25 Apr 2020 12:24)    I&O's Summary      PHYSICAL EXAM:  GENERAL: NAD, well-developed, AOx3  HEAD:  Atraumatic, Normocephalic  EYES: EOMI, PERRL, conjunctiva and sclera clear  NECK: Supple, No JVD  CHEST/LUNG: Clear to auscultation bilaterally  HEART: Regular rate and rhythm; No murmurs, rubs, or gallops, No Edema  ABDOMEN: Soft, Nontender, Nondistended; Bowel sounds present  EXTREMITIES:  2+ Peripheral Pulses, No clubbing, cyanosis  PSYCH: No SI/HI  NEUROLOGY: non-focal  SKIN: No rashes or lesions    LABS:                        11.5   6.68  )-----------( 449      ( 26 Apr 2020 06:00 )             35.8     04-26    142  |  106  |  22  ----------------------------<  100<H>  3.3<L>   |  22  |  0.86    Ca    9.3      26 Apr 2020 06:00  Phos  2.8     04-26  Mg     2.0     04-26                    RADIOLOGY & ADDITIONAL TESTS:    Imaging Personally Reviewed:    Consultant(s) Notes Reviewed:      Care Discussed with Consultants/Other Providers:

## 2020-04-26 NOTE — PROGRESS NOTE ADULT - PROBLEM SELECTOR PLAN 1
Desaturating to 88% on RA when taken of O2, now requiring 4L  CXR with bilateral patchy opacities, COVID+  completed Plaquenil   requiring O2 at rest again, titrate as possible - repeat dimer up, ferritin and CRP stable - will check CT-A r/o PE considering was off O2 and now again hypoxic   -Supportive care with Tylenol, Albuterol HFA and cough syrup

## 2020-04-26 NOTE — PROGRESS NOTE ADULT - PROBLEM SELECTOR PLAN 7
Discussed goals of care with pt as there is COVID-19 infx in addition to other morbidities such as infection, cancer, currently not being treated. Pt does not want resuscitation, and would like to be DNR/DNI; however pt still wishing for treatment so will not pursue home hospice at this time  DW daughter - in agreement with plan

## 2020-04-27 NOTE — PROVIDER CONTACT NOTE (OTHER) - SITUATION
pt f/s 71, refusing to drink any juice at this moment or eat any crackers. Pt verbalized understanding of hypoglycemia and risks involve,

## 2020-04-27 NOTE — PROVIDER CONTACT NOTE (OTHER) - BACKGROUND
Patient with history of diverticulosis, diabetes mellitus, renal cancer, prostate cancer, hyperlipidemia, benign hypertension, weakness

## 2020-04-27 NOTE — PROGRESS NOTE ADULT - SUBJECTIVE AND OBJECTIVE BOX
SUBJECTIVE / OVERNIGHT EVENTS:  Patient still anxious about restarting treatment for his rectal CA.  Otherwise feels well. Bill shortness of breath, has been afebrile.  BP elevated yesterday.      MEDICATIONS  (STANDING):  ALBUTerol    90 MICROgram(s) HFA Inhaler 2 Puff(s) Inhalation every 6 hours  aspirin enteric coated 81 milliGRAM(s) Oral daily  dextrose 5%. 1000 milliLiter(s) (50 mL/Hr) IV Continuous <Continuous>  dextrose 50% Injectable 12.5 Gram(s) IV Push once  dextrose 50% Injectable 25 Gram(s) IV Push once  dextrose 50% Injectable 25 Gram(s) IV Push once  enoxaparin Injectable 40 milliGRAM(s) SubCutaneous daily  insulin lispro (HumaLOG) corrective regimen sliding scale   SubCutaneous Before meals and at bedtime  lisinopril 10 milliGRAM(s) Oral daily  sodium chloride 2 Gram(s) Oral two times a day    MEDICATIONS  (PRN):  acetaminophen   Tablet .. 650 milliGRAM(s) Oral every 6 hours PRN Temp greater or equal to 38.5C (101.3F)  benzonatate 100 milliGRAM(s) Oral three times a day PRN Cough  dextrose 40% Gel 15 Gram(s) Oral once PRN Blood Glucose LESS THAN 70 milliGRAM(s)/deciliter  glucagon  Injectable 1 milliGRAM(s) IntraMuscular once PRN Glucose LESS THAN 70 milligrams/deciliter  guaifenesin/dextromethorphan  Syrup 10 milliLiter(s) Oral every 6 hours PRN Cough      Vital Signs Last 24 Hrs  T(C): 37.2 (27 Apr 2020 12:10), Max: 37.2 (27 Apr 2020 12:10)  T(F): 99 (27 Apr 2020 12:10), Max: 99 (27 Apr 2020 12:10)  HR: 109 (27 Apr 2020 12:10) (102 - 109)  BP: 162/102 (27 Apr 2020 12:10) (149/93 - 162/102)  BP(mean): --  RR: 20 (27 Apr 2020 12:10) (17 - 20)  SpO2: 98% (27 Apr 2020 12:10) (96% - 99%)  CAPILLARY BLOOD GLUCOSE      POCT Blood Glucose.: 86 mg/dL (27 Apr 2020 12:07)  POCT Blood Glucose.: 88 mg/dL (27 Apr 2020 08:34)  POCT Blood Glucose.: 82 mg/dL (26 Apr 2020 21:57)  POCT Blood Glucose.: 91 mg/dL (26 Apr 2020 17:16)    I&O's Summary      PHYSICAL EXAM:  GENERAL: NAD, well-developed  EYES: EOMI, PERRLA, conjunctiva and sclera clear  CHEST/LUNG: Clear to auscultation bilaterally; No wheeze  HEART: Regular rate and rhythm; No murmurs, rubs, or gallops  ABDOMEN: Soft, Nontender, Nondistended; Bowel sounds present  EXTREMITIES:  2+ Peripheral Pulses, No clubbing, cyanosis, or edema  PSYCH: AAOx3  NEUROLOGY: non-focal  SKIN: No rashes or lesions    LABS:                        12.2   8.53  )-----------( 506      ( 27 Apr 2020 06:15 )             37.8     04-27    143  |  107  |  24<H>  ----------------------------<  101<H>  4.3   |  23  |  0.94    Ca    9.8      27 Apr 2020 06:15  Phos  2.3     04-27  Mg     2.1     04-27

## 2020-04-27 NOTE — PROGRESS NOTE ADULT - ASSESSMENT
This is a 75 year old man with a history of resected prostate cacner (2000), resected left renal cancer ( L partial nephrectomy 2000), HLD, HTN, currently on treatment for metastatic rectosigmoid cancer (signet cell adenocarcinoma) on FOLFOX with Vectibix, with recent admission 4/3 for proctocolitis, dcd home on PO abx, now p/w sob, admitted for acute hypoxic respiratory failure, found to be COVID 19 +     COVID 19 - Respiratory failure with hypoxia.  CXR with bilateral patchy opacities, COVID by swab PCR.  completed course of Plaquenil.  O2 saturation has been more stable lately, 99% on nasal canula.  Would give this another day then wean to RA if saturation stays > 94% for the rest of the day.  Sisi first tested positive on 4/18 just over a week ago.  Informed him that typically patient should not resume care for the cancer by chemotherapy with an active infection.  However there is a chance that he might have cleared the virus in 9 days. Can swab for PCR again, and if negative would be more re-assuring to restart therapy.    -upportive care with Tylenol, Albuterol HFA and cough syrup.         -Proctocolitis - CT a/p reports mild inc in size of collection abscess vs necrotic disease.  No improvement iwth antibiotics so suspect this is due to the underlying malignancy rather than infection.      -metastatic colon cancer - informed patient that we can not restart treatment for htis until he is cleared from the SARS-CoV2 virus, can reswab to moni us some more re-assurance of this should the swab rturn negative. IF positive would have to wait up to a month or more for clearance.    Restarting too soon would do more harm than good.  I relayed this to Dr. Sandoval whom I work with and they were having a similar discussion with Darcie Paige.      - Hyponatremia. Pre-existing condition  -C/w fluid restriction and 2g TID salt tabs  resolved.       -DVT prophylaxis - lovenox SQ    -Hypertension - seems to have started prior to admission, sometime after receiving Vectibix, could be a treatable side effect of that medication.  Patient was previously on lisinopril 10mg prior to hospital stay which was held, can restart this.      Dispo: Discharge home with home PT as per PT evaluation.   +/- home O2, from the saturations we have lately may not even need home O2, would leave patient on O2 nasal canula 2 L for tonight if stable would wean to RA tomorrow morning and check ambulatory O2 saturation.  If > 94% then can discharge.  Would set as a goal for Wednesday 4/29. This is a 75 year old man with a history of resected prostate cacner (2000), resected left renal cancer ( L partial nephrectomy 2000), HLD, HTN, currently on treatment for metastatic rectosigmoid cancer (signet cell adenocarcinoma) on FOLFOX with Vectibix, with recent admission 4/3 for proctocolitis, dcd home on PO abx, now p/w sob, admitted for acute hypoxic respiratory failure, found to be COVID 19 +     COVID 19 - Respiratory failure with hypoxia.  CXR with bilateral patchy opacities, COVID by swab PCR.  completed course of Plaquenil.  O2 saturation has been more stable lately, 99% on nasal canula.  Would give this another day then wean to RA if saturation stays > 94% for the rest of the day.  Sisi first tested positive on 4/18 just over a week ago.  Informed him that typically patient should not resume care for the cancer by chemotherapy with an active infection.  However there is a chance that he might have cleared the virus in 9 days. Can swab for PCR again, and if negative would be more re-assuring to restart therapy.    -upportive care with Tylenol, Albuterol HFA and cough syrup.         -Proctocolitis - CT a/p reports mild inc in size of collection abscess vs necrotic disease.  No improvement iwth antibiotics so suspect this is due to the underlying malignancy rather than infection.      -metastatic colon cancer - informed patient that we can not restart treatment for htis until he is cleared from the SARS-CoV2 virus, can reswab to moni us some more re-assurance of this should the swab rturn negative. IF positive would have to wait up to a month or more for clearance.    Restarting too soon would do more harm than good.  I relayed this to Dr. Sandoval whom I work with and they were having a similar discussion with Darcie Paige.      - Hyponatremia. Pre-existing condition  -C/w fluid restriction and 2g TID salt tabs  resolved.       -DVT prophylaxis - lovenox SQ    -Hypertension - seems to have started prior to admission, sometime after receiving Vectibix, could be a treatable side effect of that medication.  Patient was previously on lisinopril 10mg prior to hospital stay which was held, can restart this.      Dispo: Discharge home with home PT as per PT evaluation.   +/- home O2, from the saturations we have lately may not even need home O2, would leave patient on O2 nasal canula 2 L for tonight if stable would wean to RA tomorrow morning and check ambulatory O2 saturation.  If > 94% then can discharge.  Would set as a goal for Wednesday 4/29.  Plan discussed with patient's son Orlin who expressed his understanding.

## 2020-04-28 NOTE — PROGRESS NOTE ADULT - SUBJECTIVE AND OBJECTIVE BOX
SUBJECTIVE / OVERNIGHT EVENTS:  Patient with no new complaints.  I had previously discussed with patient that if he wants to start treatment as soon as he can he would need to have a negative PCR for Covid before restarting his chemotherapy, otherwise the [facility] would have to wait a month before treatment can be safely treated again.   When they came to perform this patient refused the swab.    MEDICATIONS  (STANDING):  ALBUTerol    90 MICROgram(s) HFA Inhaler 2 Puff(s) Inhalation every 6 hours  aspirin enteric coated 81 milliGRAM(s) Oral daily  dextrose 5%. 1000 milliLiter(s) (50 mL/Hr) IV Continuous <Continuous>  dextrose 50% Injectable 12.5 Gram(s) IV Push once  dextrose 50% Injectable 25 Gram(s) IV Push once  dextrose 50% Injectable 25 Gram(s) IV Push once  enoxaparin Injectable 40 milliGRAM(s) SubCutaneous daily  insulin lispro (HumaLOG) corrective regimen sliding scale   SubCutaneous Before meals and at bedtime  lisinopril 10 milliGRAM(s) Oral daily  sodium chloride 2 Gram(s) Oral two times a day    MEDICATIONS  (PRN):  acetaminophen   Tablet .. 650 milliGRAM(s) Oral every 6 hours PRN Temp greater or equal to 38.5C (101.3F)  benzonatate 100 milliGRAM(s) Oral three times a day PRN Cough  dextrose 40% Gel 15 Gram(s) Oral once PRN Blood Glucose LESS THAN 70 milliGRAM(s)/deciliter  glucagon  Injectable 1 milliGRAM(s) IntraMuscular once PRN Glucose LESS THAN 70 milligrams/deciliter  guaifenesin/dextromethorphan  Syrup 10 milliLiter(s) Oral every 6 hours PRN Cough      Vital Signs Last 24 Hrs  T(C): 36.8 (28 Apr 2020 14:19), Max: 37.1 (27 Apr 2020 16:50)  T(F): 98.2 (28 Apr 2020 14:19), Max: 98.8 (27 Apr 2020 16:50)  HR: 111 (28 Apr 2020 14:19) (103 - 111)  BP: 144/85 (28 Apr 2020 14:19) (144/85 - 160/111)  BP(mean): --  RR: 19 (28 Apr 2020 14:19) (18 - 19)  SpO2: 100% (28 Apr 2020 14:19) (98% - 100%)  CAPILLARY BLOOD GLUCOSE      POCT Blood Glucose.: 111 mg/dL (28 Apr 2020 12:21)  POCT Blood Glucose.: 80 mg/dL (28 Apr 2020 08:30)  POCT Blood Glucose.: 72 mg/dL (27 Apr 2020 21:57)  POCT Blood Glucose.: 90 mg/dL (27 Apr 2020 17:14)    I&O's Summary    LABS:                        11.7   8.03  )-----------( 504      ( 28 Apr 2020 04:45 )             35.6     04-27    143  |  107  |  24<H>  ----------------------------<  101<H>  4.3   |  23  |  0.94    Ca    9.8      27 Apr 2020 06:15  Phos  2.3     04-27  Mg     2.1     04-27                      RADIOLOGY & ADDITIONAL TESTS:    Imaging Personally Reviewed:    Consultant(s) Notes Reviewed:      Care Discussed with Consultants/Other Providers:

## 2020-04-28 NOTE — PROGRESS NOTE ADULT - ASSESSMENT
This is a 75 year old man with a history of resected prostate cacner (2000), resected left renal cancer ( L partial nephrectomy 2000), HLD, HTN, currently on treatment for metastatic rectosigmoid cancer (signet cell adenocarcinoma) on FOLFOX with Vectibix, with recent admission 4/3 for proctocolitis, dcd home on PO abx, now p/w sob, admitted for acute hypoxic respiratory failure, found to be COVID 19 +     COVID 19 - Respiratory failure with hypoxia.  CXR with bilateral patchy opacities, COVID by swab PCR.  completed course of Plaquenil.  O2 saturation has been more stable lately, 99% on nasal canula.   Since patient being discharged home can retry an ambulatory O2 saturation, if > 94% without oxygen could reasonably discharge to home with home PT without oxygen.  Patient first tested positive on 4/18 just over a week ago.  Informed him that typically patient should not resume care for the cancer by chemotherapy with an active infection.  However there is a chance that he might have cleared the virus in the past 10 days. Had explained htis to him several times yesterday as doing this would speed up his ability to restart chemotherapy should it the result be negative.  Patient refused the repeat PCR swab despite this discussion.  I spoke to his son about this, if they could help convince patient to do it would be very helpful.     -upportive care with Tylenol, Albuterol HFA and cough syrup.         -Proctocolitis - CT a/p reports mild inc in size of collection abscess vs necrotic disease.  No improvement iwth antibiotics so suspect this is due to the underlying malignancy rather than infection.      -metastatic colon cancer - informed patient that we can not restart treatment for htis until he is cleared from the SARS-CoV2 virus, can reswab to moni us some more re-assurance of this should the swab rturn negative. IF positive would have to wait up to a month or more for clearance.    Restarting too soon would do more harm than good.  I relayed this to Dr. Sandoval whom I work with and they were having a similar discussion with Mr. Gibson.      - Hyponatremia. Pre-existing condition  -C/w fluid restriction and 2g TID salt tabs  resolved.       -DVT prophylaxis - lovenox SQ    -Hypertension - seems to have started prior to admission, sometime after receiving Vectibix, could be a treatable side effect of that medication.  Patient was previously on lisinopril 10mg prior to hospital stay which was held, can restart this.      Dispo: Discharge home with home PT, if ambulatory O2 sats are appropriate on room air could discharge him without oxygen.

## 2020-04-29 NOTE — DISCHARGE NOTE PROVIDER - NSDCFUSCHEDAPPT_GEN_ALL_CORE_FT
EDEN SOLIS ; 05/08/2020 ; [facility]  EDEN SOLIS ; 05/11/2020 ; [facility]  EDEN SOLIS ; 05/12/2020 ; [facility] [place] [address] [place] [address]  EDEN SOLIS ; 05/26/2020 ; [facility] EDEN SOLIS ; 05/08/2020 ; [facility]  EDEN SOLIS ; 05/11/2020 ; [facility]  EDEN SOLIS ; 05/12/2020 ; [facility] [place] [address] [place] [address]  EDEN SOLIS ; 05/26/2020 ; [facility] EDEN SOLIS ; 05/08/2020 ; [facility]  EDEN SOLIS ; 05/11/2020 ; [facility]  EDEN SOLIS ; 05/12/2020 ; [facility] [place] [address] [place] [address]  EDEN SOLIS ; 05/26/2020 ; [facility] EDEN SOLIS ; 05/08/2020 ; [facility]  EDEN SOLIS ; 05/11/2020 ; [facility]  EDEN SOLIS ; 05/12/2020 ; [facility] [place] [address] [place] [address]  EDEN SOLIS ; 05/26/2020 ; [facility]

## 2020-04-29 NOTE — DISCHARGE NOTE PROVIDER - CARE PROVIDER_API CALL
Joel Villalobos)  Hematology; Medical Oncology  [address] [place] [address]  Phone: [phone]  Fax: [phone]  Follow Up Time:

## 2020-04-29 NOTE — PROGRESS NOTE ADULT - ASSESSMENT
This is a 75 year old man with a history of resected prostate cancer (2000), resected left renal cancer ( L partial nephrectomy 2000), HLD, HTN, currently on treatment for metastatic rectosigmoid cancer (signet cell adenocarcinoma) on FOLFOX with Vectibix, with recent admission 4/3 for proctocolitis, dcd home on PO abx, now p/w sob, admitted for acute hypoxic respiratory failure, found to be COVID 19 +     COVID 19 - Respiratory failure with hypoxia.  which has mostly resolved, O2 sat currently 96% on Room air.  Patient no longer has any respiratory complaints.  Would check one more pulse ox on RA before coming  to discharge without oxygen.        -Proctocolitis - CT a/p reports mild inc in size of collection abscess vs necrotic disease.  No improvement with antibiotics so suspect this is due to the underlying malignancy rather than infection.      -metastatic colon cancer -  Unfortunately can not be treated, patient with a poor functional status given recent Covid 19 infection. Patient will need to complete home PT to improve functional status prior to re-initiation of treatment.  This setback would have happened regardless given patient's COVID 19 status, repeat PCR still positive 10 days after initial diagnosis which is unsurprising We did discuss several times that he would need to wait 14 days and have a negative PCR or wait a full 28 days overall which would be the more probable.      - Hyponatremia - resolved.          -DVT prophylaxis - lovenox SQ.  IMPROVE VTE risk score 3 (2.9%) would not require extended VTE prophylaxis at home.      -Hypertension - seems to have started prior to admission, sometime after receiving Vectibix, could be a treatable side effect of that medication.  Patient was previously on lisinopril 10mg prior to hospital stay which was held but restarted yesterday.  BP slightly high but acceptable, would restart this for home too.      Dispo: Discharge home with home PT, if ambulatory O2 sats are appropriate on room air could discharge him without oxygen. This is a 75 year old man with a history of resected prostate cancer (2000), resected left renal cancer ( L partial nephrectomy 2000), HLD, HTN, currently on treatment for metastatic rectosigmoid cancer (signet cell adenocarcinoma) on FOLFOX with Vectibix, with recent admission 4/3 for proctocolitis, dcd home on PO abx, now p/w sob, admitted for acute hypoxic respiratory failure, found to be COVID 19 +     COVID 19 - Respiratory failure with hypoxia.  which has mostly resolved, O2 sat currently 96% on Room air.  Patient no longer has any respiratory complaints.  Would check one more pulse ox on RA before coming  to discharge without oxygen.        -Proctocolitis - CT a/p reports mild inc in size of collection abscess vs necrotic disease.  No improvement with antibiotics so suspect this is due to the underlying malignancy rather than infection.      -metastatic colon cancer -  Unfortunately can not be treated, patient with a poor functional status given recent Covid 19 infection. Patient will need to complete home PT to improve functional status prior to re-initiation of treatment.  This setback would have happened regardless given patient's COVID 19 status, repeat PCR still positive 10 days after initial diagnosis which is unsurprising We did discuss several times that he would need to wait 14 days and have a negative PCR or wait a full 28 days overall which would be the more probable.  Will update patient's primary oncologist.      - Hyponatremia - resolved.          -DVT prophylaxis - lovenox SQ.  IMPROVE VTE risk score 3 (2.9%) would not require extended VTE prophylaxis at home.      -Hypertension - seems to have started prior to admission, sometime after receiving Vectibix, could be a treatable side effect of that medication.  Patient was previously on lisinopril 10mg prior to hospital stay which was held but restarted yesterday.  BP slightly high but acceptable, would restart this for home too.      Dispo: Discharge home with home PT, if ambulatory O2 sats are appropriate on room air could discharge him without oxygen.      Attempted a couple of times to call patient's son Orlin, so far unsuccessful

## 2020-04-29 NOTE — DISCHARGE NOTE PROVIDER - NSDCCPCAREPLAN_GEN_ALL_CORE_FT
PRINCIPAL DISCHARGE DIAGNOSIS  Diagnosis: Infection due to 2019 novel coronavirus  Assessment and Plan of Treatment: You have been diagnosed with the COVID-19 virus during your hospital stay. You must self quarantine to complete a 14 day time period.  Based on the initial diagnosis date, your quarantine can end on 5/3/2020. Monitor for fevers, shortness of breath and cough primarily.  Monitor your temperature daily to not any changes and increases.    It has been determined that you no longer need hospitalization and can recover while remaining in self-quarantine at home. You should follow the prevention steps below until a healthcare provider or local or state health department says you can return to your normal activities.  1. You should restrict activities outside your home, except for getting medical care.  2. Do not go to work, school, or public areas.  3. Avoid using public transportation, ride-sharing, or taxis.  4. Separate yourself from other people and animals in your home.  5. Call ahead before visiting your doctor.  6. Wear a facemask.  7. Cover your coughs and sneezes.  8. Clean your hands often.  9. Avoid sharing personal household items.  10. Clean all “high-touch” surfaces everyday.  11. Monitor your symptoms.  If you have a medical emergency and need to call 911, notify the dispatch personnel that you have COVID-19 If possible, put on a facemask before emergency medical services arrive.  12. Stopping home isolation.  Patients with confirmed COVID-19 should remain under home isolation precautions for 14 days since the positive COVID-19 test and until the risk of secondary transmission to others is thought to be low. The decision to discontinue home isolation precautions should be made on a case-by-case basis, in consultation with healthcare providers and state and local health departments. Your [facility] Department of Health can be reached at [phone] for further information about COVID-19.        SECONDARY DISCHARGE DIAGNOSES  Diagnosis: Colon cancer  Assessment and Plan of Treatment: Please follow up with your oncologist for further recommendations    Diagnosis: Hyponatremia  Assessment and Plan of Treatment: - Resolved    Diagnosis: Hypokalemia  Assessment and Plan of Treatment: - Resolved

## 2020-04-29 NOTE — DISCHARGE NOTE PROVIDER - HOSPITAL COURSE
75 year old male Hx of resected prostate cancer (2000), resected left renal cancer ( L partial nephrectomy 2000), HLD, HTN, currently on treatment for metastatic rectosigmoid cancer (signet cell adenocarcinoma) on FOLFOX with Vectibix, with recent admission 4/3 for proctocolitis, dcd home on PO abx, now p/w sob, admitted for acute hypoxic respiratory failure, found to be COVID 19 +. 75 year old male Hx of resected prostate cancer (2000), resected left renal cancer ( L partial nephrectomy 2000), HLD, HTN, currently on treatment for metastatic rectosigmoid cancer (signet cell adenocarcinoma) on FOLFOX with Vectibix, with recent admission 4/3 for proctocolitis, dcd home on PO abx, now p/w sob, admitted for acute hypoxic respiratory failure, found to be COVID 19+ and admitted for management.  During hospital stay patient progressively improved and required a lower amount of supplemental oxygen. CTPA was negative for pulmonary emboli. Physical therapy recommendation is for patient to receive home PT. COVID+ test result from 4/28 may cause delay in continuation of patients chemotherapy. 75 year old male Hx of resected prostate cancer (2000), resected left renal cancer ( L partial nephrectomy 2000), HLD, HTN, currently on treatment for metastatic rectosigmoid cancer (signet cell adenocarcinoma) on FOLFOX with Vectibix, with recent admission 4/3 for proctocolitis, dcd home on PO abx, now p/w sob, admitted for acute hypoxic respiratory failure, found to be COVID 19+ and admitted for management.  During hospital stay patient progressively improved and required a lower amount of supplemental oxygen. CTPA was negative for pulmonary emboli. Physical therapy recommendation is for patient to receive home PT. COVID+ test result from 4/28 may cause delay in continuation of patients chemotherapy. As per nurse, patient desatted to 92% with ambulation. As per attending patient is medically cleared for discharge to home with PT.

## 2020-04-29 NOTE — PROGRESS NOTE ADULT - SUBJECTIVE AND OBJECTIVE BOX
SUBJECTIVE / OVERNIGHT EVENTS:  Patient feeling well, no new complaints.  He is very concerned about going home.  Informed him that we are in the processes of discharging to home with a home physical therapist.      MEDICATIONS  (STANDING):  ALBUTerol    90 MICROgram(s) HFA Inhaler 2 Puff(s) Inhalation every 6 hours  aspirin enteric coated 81 milliGRAM(s) Oral daily  dextrose 5%. 1000 milliLiter(s) (50 mL/Hr) IV Continuous <Continuous>  dextrose 50% Injectable 12.5 Gram(s) IV Push once  dextrose 50% Injectable 25 Gram(s) IV Push once  dextrose 50% Injectable 25 Gram(s) IV Push once  enoxaparin Injectable 40 milliGRAM(s) SubCutaneous daily  insulin lispro (HumaLOG) corrective regimen sliding scale   SubCutaneous Before meals and at bedtime  lisinopril 10 milliGRAM(s) Oral daily  sodium chloride 2 Gram(s) Oral two times a day    MEDICATIONS  (PRN):  acetaminophen   Tablet .. 650 milliGRAM(s) Oral every 6 hours PRN Temp greater or equal to 38.5C (101.3F)  benzonatate 100 milliGRAM(s) Oral three times a day PRN Cough  dextrose 40% Gel 15 Gram(s) Oral once PRN Blood Glucose LESS THAN 70 milliGRAM(s)/deciliter  glucagon  Injectable 1 milliGRAM(s) IntraMuscular once PRN Glucose LESS THAN 70 milligrams/deciliter  guaifenesin/dextromethorphan  Syrup 10 milliLiter(s) Oral every 6 hours PRN Cough      Vital Signs Last 24 Hrs  T(C): 36.6 (29 Apr 2020 11:48), Max: 36.6 (29 Apr 2020 11:48)  T(F): 97.9 (29 Apr 2020 11:48), Max: 97.9 (29 Apr 2020 11:48)  HR: 118 (29 Apr 2020 11:48) (103 - 118)  BP: 142/94 (29 Apr 2020 11:48) (142/94 - 151/95)  BP(mean): --  RR: 18 (29 Apr 2020 11:48) (18 - 19)  SpO2: 96% (29 Apr 2020 11:48) (96% - 99%)  CAPILLARY BLOOD GLUCOSE      POCT Blood Glucose.: 80 mg/dL (29 Apr 2020 12:18)  POCT Blood Glucose.: 89 mg/dL (29 Apr 2020 08:30)  POCT Blood Glucose.: 105 mg/dL (28 Apr 2020 22:36)  POCT Blood Glucose.: 95 mg/dL (28 Apr 2020 21:53)  POCT Blood Glucose.: 89 mg/dL (28 Apr 2020 17:08)    I&O's Summary      LABS:                        11.5   8.02  )-----------( 421      ( 29 Apr 2020 06:19 )             36.4     04-29    141  |  104  |  28<H>  ----------------------------<  96  4.0   |  22  |  0.94    Ca    10.2      29 Apr 2020 06:19    TPro  6.7  /  Alb  3.0<L>  /  TBili  0.4  /  DBili  x   /  AST  17  /  ALT  17  /  AlkPhos  62  04-29

## 2020-04-29 NOTE — DISCHARGE NOTE PROVIDER - NSDCMRMEDTOKEN_GEN_ALL_CORE_FT
Aspir 81 oral delayed release tablet: 1  orally once a week last dose on 2/28/20  cefuroxime 500 mg oral tablet: 1 tab(s) orally every 12 hours   Flagyl 500 mg oral tablet: 1 tab(s) orally every 12 hours   magnesium oxide 400 mg (240 mg elemental magnesium) oral tablet: 1  orally 2 times a day  metFORMIN 500 mg oral tablet, extended release: 1 tab(s) orally once a day  sodium chloride 1 g oral tablet: 2 tab(s) orally 3 times a day Aspir 81 oral delayed release tablet: 1  orally once a week last dose on 2/28/20  magnesium oxide 400 mg (240 mg elemental magnesium) oral tablet: 1  orally 2 times a day  metFORMIN 500 mg oral tablet, extended release: 1 tab(s) orally once a day Aspir 81 oral delayed release tablet: 1  orally once a week last dose on 2/28/20  lisinopril 10 mg oral tablet: 1 tab(s) orally once a day  magnesium oxide 400 mg (240 mg elemental magnesium) oral tablet: 1  orally 2 times a day  metFORMIN 500 mg oral tablet, extended release: 1 tab(s) orally once a day

## 2020-04-30 NOTE — DIETITIAN INITIAL EVALUATION ADULT. - PERTINENT MEDS FT
Home Medications:  Aspir 81 oral delayed release tablet: 1  orally once a week last dose on 2/28/20 (18 Apr 2020 18:47)  magnesium oxide 400 mg (240 mg elemental magnesium) oral tablet: 1  orally 2 times a day (18 Apr 2020 18:47)  metFORMIN 500 mg oral tablet, extended release: 1 tab(s) orally once a day (18 Apr 2020 18:47)

## 2020-04-30 NOTE — PROGRESS NOTE ADULT - ASSESSMENT
This is a 75 year old man with a history of resected prostate cancer (2000), resected left renal cancer ( L partial nephrectomy 2000), HLD, HTN, currently on treatment for metastatic rectosigmoid cancer (signet cell adenocarcinoma) on FOLFOX with Vectibix, with recent admission 4/3 for proctocolitis, dcd home on PO abx, now p/w sob, admitted for acute hypoxic respiratory failure, found to be COVID 19 +     COVID 19 - Respiratory failure with hypoxia.  which has mostly resolved, O2 sat currently 96% on Room air.  Patient having fair ambulatory saturations.        -Proctocolitis - CT a/p reports mild inc in size of collection abscess vs necrotic disease.  No improvement with antibiotics so suspect this is due to the underlying malignancy rather than infection.      -metastatic colon cancer -  Unfortunately can not be treated, patient with a poor functional status given recent Covid 19 infection. Patient will need to complete home PT to improve functional status prior to re-initiation of treatment.  This setback would have happened regardless given patient's COVID 19 status, repeat PCR still positive 10 days after initial diagnosis which is unsurprising We did discuss several times that he would need to wait 14 days and have a negative PCR or wait a full 28 days overall which would be the more probable.  Updated patient's primary oncologist Dr. Villalobos.     - Hyponatremia - resolved.          -DVT prophylaxis - lovenox SQ.  IMPROVE VTE risk score 3 (2.9%) would not require extended VTE prophylaxis at home.      -Hypertension - seems to have started prior to admission, sometime after receiving Vectibix, could be a treatable side effect of that medication.  Patient was previously on lisinopril 10mg prior to hospital stay which was held but restarted yesterday.  BP slightly high but acceptable, would restart this for home too.      Dispo:  Discharge to home with home PT.      Contacted patient's son Orlin to notify him of the impending discharge.

## 2020-04-30 NOTE — PROGRESS NOTE ADULT - SUBJECTIVE AND OBJECTIVE BOX
SUBJECTIVE / OVERNIGHT EVENTS:  Patient feeling well, still desiring to go home.  States he feels comfortalbe and strong.      MEDICATIONS  (STANDING):  ALBUTerol    90 MICROgram(s) HFA Inhaler 2 Puff(s) Inhalation every 6 hours  aspirin enteric coated 81 milliGRAM(s) Oral daily  dextrose 5%. 1000 milliLiter(s) (50 mL/Hr) IV Continuous <Continuous>  dextrose 50% Injectable 12.5 Gram(s) IV Push once  dextrose 50% Injectable 25 Gram(s) IV Push once  dextrose 50% Injectable 25 Gram(s) IV Push once  enoxaparin Injectable 40 milliGRAM(s) SubCutaneous daily  insulin lispro (HumaLOG) corrective regimen sliding scale   SubCutaneous Before meals and at bedtime  lisinopril 10 milliGRAM(s) Oral daily  sodium chloride 2 Gram(s) Oral two times a day    MEDICATIONS  (PRN):  acetaminophen   Tablet .. 650 milliGRAM(s) Oral every 6 hours PRN Temp greater or equal to 38.5C (101.3F)  benzonatate 100 milliGRAM(s) Oral three times a day PRN Cough  dextrose 40% Gel 15 Gram(s) Oral once PRN Blood Glucose LESS THAN 70 milliGRAM(s)/deciliter  glucagon  Injectable 1 milliGRAM(s) IntraMuscular once PRN Glucose LESS THAN 70 milligrams/deciliter  guaifenesin/dextromethorphan  Syrup 10 milliLiter(s) Oral every 6 hours PRN Cough      Vital Signs Last 24 Hrs  T(C): 36.7 (30 Apr 2020 04:35), Max: 36.8 (29 Apr 2020 21:45)  T(F): 98 (30 Apr 2020 04:35), Max: 98.2 (29 Apr 2020 21:45)  HR: 106 (30 Apr 2020 04:35) (106 - 111)  BP: 143/94 (30 Apr 2020 04:35) (136/95 - 143/94)  BP(mean): --  RR: 18 (30 Apr 2020 04:35) (18 - 19)  SpO2: 94% (30 Apr 2020 04:35) (92% - 94%)  CAPILLARY BLOOD GLUCOSE      POCT Blood Glucose.: 102 mg/dL (30 Apr 2020 12:08)  POCT Blood Glucose.: 104 mg/dL (30 Apr 2020 08:48)  POCT Blood Glucose.: 102 mg/dL (29 Apr 2020 22:39)  POCT Blood Glucose.: 111 mg/dL (29 Apr 2020 16:49)    I&O's Summary    LABS:                        11.8   8.33  )-----------( 480      ( 30 Apr 2020 05:59 )             36.7     04-29    141  |  104  |  28<H>  ----------------------------<  96  4.0   |  22  |  0.94    Ca    10.2      29 Apr 2020 06:19    TPro  6.7  /  Alb  3.0<L>  /  TBili  0.4  /  DBili  x   /  AST  17  /  ALT  17  /  AlkPhos  62  04-29

## 2020-04-30 NOTE — DIETITIAN INITIAL EVALUATION ADULT. - ADD RECOMMEND
1). Suggest Glucerna Therapeutic Nutrition Shake 240mls 3x daily (660kcals, 30g protein). 2). Monitor weights, labs, BM's, skin integrity, p.o. intake. 3). Follow pt as per protocol.

## 2020-04-30 NOTE — DIETITIAN INITIAL EVALUATION ADULT. - PERTINENT LABORATORY DATA
04-29 Na 141 mmol/L Glu 96 mg/dL K+ 4.0 mmol/L Cr 0.94 mg/dL BUN 28 mg/dL<H> Phos n/a    04-30 @ 08:48 POCT 104 mg/dL  04-29 @ 22:39 POCT 102 mg/dL  04-29 @ 16:49 POCT 111 mg/dL  04-29 @ 12:18 POCT 80 mg/dL

## 2020-04-30 NOTE — DISCHARGE NOTE NURSING/CASE MANAGEMENT/SOCIAL WORK - PATIENT PORTAL LINK FT
You can access the FollowMyHealth Patient Portal offered by [facility] by registering at the following website: [email or web address]. By joining [employer]’s FollowMyHealth portal, you will also be able to view your health information using other applications (apps) compatible with our system.

## 2020-04-30 NOTE — DIETITIAN INITIAL EVALUATION ADULT. - OTHER INFO
76 y/o male with metastatic colon ca, admitted with dx of acute respiratory failure with hypoxia 2/2 COVID-19+. Unable to conduct a face to face interview or nutrition-focused physical exam due to limited contact restrictions related to pt's medical condition and isolation precautions. Attempted to contact pt and son Orlin via telephone, however calls were either not answered or went to voicemail. Review of EMC reveals that pt experienced SOB, weakness, abdominal pain and poor PO for approx 1 week PTA. Poor oral intake is noted in H&P to have persisted since prior to his 4/3/20 [facility] admission. Given that pt's oral intake has been suboptimal for a lengthy period of time, consider Glucerna Therapeutic Nutrition Shake 240mls 3x daily (660kcals, 30g protein) to assist with optimizing nutrition. Unable to assess weight status at this time, although current weight is below IBW by 3 kg. GOC ongoing with family. RDN services to remain available as needed.

## 2020-04-30 NOTE — PROGRESS NOTE ADULT - CONSTITUTIONAL
detailed exam
Well-developed, well nourished

## 2020-05-01 PROBLEM — R73.03 PREDIABETES: Status: ACTIVE | Noted: 2020-01-01

## 2020-05-01 PROBLEM — E78.5 HYPERLIPIDEMIA, UNSPECIFIED HYPERLIPIDEMIA TYPE: Status: ACTIVE | Noted: 2020-01-01

## 2020-05-01 PROBLEM — I70.0 AORTIC ATHEROSCLEROSIS: Status: ACTIVE | Noted: 2020-01-01

## 2020-05-01 PROBLEM — E83.42 HYPOMAGNESEMIA: Status: ACTIVE | Noted: 2019-01-01

## 2020-05-01 NOTE — CHRONIC CARE ASSESSMENT
[Walking] : walking [Can not Exercise (Disability)] : Exercise: The patient can not exercise due to disability [Low Salt Diet] : low salt [General Adherence] : and is generally adherent

## 2020-05-01 NOTE — REASON FOR VISIT
[Post Hospitalization] : a post hospitalization visit [Family Member] : family member [Pre-Visit Preparation] : pre-visit preparation was done [Intercurrent Specialty/Sub-specialty Visits] : the patient has intercurrent specialty/sub-specialty visits [FreeTextEntry3] : RN CM

## 2020-05-01 NOTE — PHYSICAL EXAM
[No Acute Distress] : no acute distress [Normal Voice/Communication] : normal voice communication [EOMI] : extra ocular movement intact [Normal Oropharynx] : the oropharynx was normal [No Respiratory Distress] : no respiratory distress [No Accessory Muscle Use] : no accessory muscle use [Non Tender] : non-tender [No Edema] : there was no peripheral edema [Soft] : abdomen soft [Not Distended] : not distended [Normal Gait] : normal gait [No Joint Swelling] : no joint swelling seen [No Rash] : no rash [Oriented x3] : oriented to person, place, and time [No Motor Deficits] : the motor exam was normal [Normal Affect] : the affect was normal [Normal Mood] : the mood was normal [Normal Insight/Judgement] : insight and judgment were intact [de-identified] : healing laparotomy site, right sided colostomy with minimal stool [de-identified] : frail, weak, not in distress, skin color pale, speaks in full sentences, no bretahing issues

## 2020-05-01 NOTE — CURRENT MEDS
[Reviewed patient reported medication adherence from Comprehensive Assessment] : Reviewed patient reported medication adherence from comprehensive assessment [High Risk Medications Reviewed and Reconciled (Beers Criteria)] : high risk medications reviewed and reconciled [Medication and Allergies Reconciled] : medication and allergies reconciled [Adherent to medications] : Patient is adherent to medications as prescribed

## 2020-05-01 NOTE — COUNSELING
[Weight counseling provided] : weight counseling provided [Mediterranean diet recommended] : Mediterranean diet recommended [Medical/Nutritional supplementation as prescribed] : medical/nutritional supplementation as prescribed [Continue diet as tolerated] : continue diet as tolerated based on goals of care [Non - Smoker] : non-smoker [Smoke/CO Detectors] : smoke/CO detectors [Use grab bars] : use grab bars [Medical alert] : medical alert [Use assistive device to avoid falls] : use assistive device to avoid falls [Remove clutter and unsafe carpeting to avoid falls] : remove clutter and unsafe carpeting to avoid falls [Date: ___] : diabetic screening completed on [unfilled] [] : foot exam [Improve exercise tolerance] : improve exercise tolerance [Improve mobility] : improve mobility [Improve weight] : improve weight [Decrease hospital use] : decrease hospital use [Improve pain control] : improve pain control [Decrease stress] : decrease stress [Maintain functional ability] : maintain functional ability [Likely to achieve goals/desired outcomes] : likely to achieve goals/desired outcomes [Discussed disease trajectory with patient/caregiver] : discussed disease trajectory with patient/caregiver [Patient/Caregiver has ___ understanding of disease process] : patient/caregiver has [unfilled] understanding of disease process [Advanced Directives discussed: ____] : Advanced directives discussed: [unfilled] [DNR] : Code Status: DNR [No Limitations] : Treatment Guidelines: No limitations [DNI] : Intubation: DNI

## 2020-05-01 NOTE — LETTER HEADER
[Care Plan reviewed and provided to patient/caregiver] : Care plan reviewed and provided to patient/caregiver [Care Plan managed/Care coordinated by: ___] : Care plan managed/Care coordinated by: [unfilled] [Initiation or substantial revisions made to care plan involving mod/high medical decision making for complex CCM] : Initiation or substantial revisions made to care plan involving mod/high medical decision making for complex CCM [Care Plan reviewed every ___ weeks] : Care plan reviewed every [unfilled] weeks [Patient/Caregiver agrees to have other providers send summary of their care to this office] : Patient/caregiver agrees to have other providers send summary of their care to this office

## 2020-05-01 NOTE — HISTORY OF PRESENT ILLNESS
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, s/p diverting colostomy and mucus fistula in January and May 2019, initiated chemotherapy 7/2019 with tumour shrinkage, stopped chemo 1/2020 before going for exploratory laparotomy for tumour debulking and colostomy reversal 3/9/2020,  (not successful), HTN, HLD, prediabetes, h/o prostrate cancer s/p radical prostatectomy, partial left nephrectomy due to benign tumour, recent hospital admission for pelvic abscess and hyponatremia 2/2 SIADH, completed abx, then readmitted on 4/19/20 for SOB found to be positive for COVID-19, discharged on 4/30/20 with home PT.\par \par hospital course- patient was found to have bilateral lung opacities on CXR, required oxygen via NC, and was ultimately weaned off, and was saturating 92 on exertion on RA just before discharge. patient was tested again for COVID on 4/28/20 to see if he can be restarted on Chemo next Monday, however repeat testing was also positive and recommendation is to hold off on chemo for now.\par \par patient is very happy to be home, and states that he feels a lot better than before however continues to feel weak with poor appetite, son is helping with getting out of bed when needed. he is mostly in bed and wearing depends, using urinal. patient denies any SOB at present, family has noticed SOB on exertion but mainky similar to what he had at baseline before going to hospital. patient was walking independently and driving himself to chemo prior to this surgery 3/9/2020 as per daughter in law and had a significant decline after surgery with being dependent on all ADLs.\par no other complaints.\par denies nausea, vomiting, fever, chills, SOB, urinary complaints.\par eats soft regular food, with no swallowing problems. \par reports poor appetite for the past month since after the surgery on 3/9/2020 with progressive weight loss\par had minimal to eat this morning, also takes ensure 2-3 times a day\par BM is soft via colostomy on right side. daughter in law changes the bag daily or as needed, does not take any laxatives, no more mucus from rectum\par sleeps ok most nights\par memory is intact\par behavior is calm with no issues\par mood is ok with no history of depression, \par no skin problems\par walks independently at baseline, now dependent on all ADLs after surgery in march, \par no recent falls\par \par sigmoid colon cancer with peritoneal disease s/p exp laparotomy- patient had an attempt for tumour debulking and reversal of colostomy early march which was not successful due to extensive disease. patient was doing ok n chemo before that, however feels extremely weak after the procedure. chemo is planned to be start next monday, however will be delayed with covid positive status.\par follows Dr Jones and Dr Sandoval. PCP is Dr Saldaña.\par \par HTN- long standing- on lisinopril daily. no complications reported.\par \par HLD- used to be on atorvastatin, was taken off due to chemotherapy\par \par SIADH- admitted with sodium levels of 117 early april 2020, started on fluid restriction and salt tablets.\par \par Patient is being seen for a visit provided via telehealth real-time audio visual technology.\par Patient was located at their home at the time of the visit.\par The [facility] clinician, Marlene Diana, was located remotely at their home in [place] at the time of the visit. \par The patient and the [facility] clinician participated in the telehealth encounter.\par Other participants included: BUCKY jean-baptiste Karley\par \par Patient and his representative consents to the use of telehealth. All questions related to telehealth answered\par \par -----------\par lives with jonathon Ordaz, and family who takes care of him\par BUCKY Burris id very involved in care\par \par

## 2020-05-12 NOTE — REVIEW OF SYSTEMS
[SOB on Exertion] : shortness of breath during exertion [Negative] : Integumentary [Recent Change In Weight] : ~T no recent weight change [Mucosal Pain] : no mucosal pain [Chest Pain] : no chest pain [Lower Ext Edema] : no lower extremity edema [Abdominal Pain] : no abdominal pain [Vomiting] : no vomiting [Constipation] : no constipation [Diarrhea] : no diarrhea

## 2020-05-12 NOTE — ASSESSMENT
[Palliative] : Goals of care discussed with patient: Palliative [With Patient/Caregiver] : : With Patient/Caregiver [Designated Health Care Proxy] : Designated Health Care Proxy [Name: ___] : Name: [unfilled] [DNR] : DNR [DNI] : DNI [AdvancecareDate] : 5/1/20

## 2020-05-12 NOTE — PHYSICAL EXAM
[Thin] : thin [Normal] : full range of motion and no deformities appreciated [de-identified] : anicteric  [de-identified] : normal respiratory effort, no audible wheeze [de-identified] : no JVD  [de-identified] : dry skin

## 2020-05-12 NOTE — HISTORY OF PRESENT ILLNESS
[Home] : at home, [unfilled] , at the time of the visit. [Patient] : the patient [Medical Office: ([facility])___] : at the medical office located in  [Self] : self [de-identified] : Yobani is a 75 y/o male diagnosed with metastatic GI adenocarcinoma of descending colon presumed s/p diverting colostomy and mucus fistula in January and May 2019.  Colonoscopy was attempted but unable to biopsy the primary site or reach it.  He feels well overall and denies any new complaints. He denies n/v/d/c, fatigue, CP, abdominal pain, palpitations, LOPEZ.  \par \par Started FOLFOX June 2019 (Vectibix added cycle 2) - Oxaliplatin held after cycle 8 - last chemo 1/8/2020\par \par Treatment held at that time for possible ostomy reversal and given minimal disease on imaging to attempt debulking\par March 2020 debulking not possible and ostomy could not be reversed given unresectable disease noted intra-op\par April 2020 admitted twice with hyponatremia and then COVID \par  [de-identified] : CEA  [FreeTextEntry1] : Palliative FOLFOX (Vectibix added cycle 2) - Oxaliplatin held after cycle 8 - last chemo 1/8/2020 [de-identified] : pan-edith wild type and left side primary\par \par  [de-identified] : Yobani presents for follow up and in the interim, he had attempted debulking but intraop tumor burden was deemed unresectable, followed by admission for hyponatremia and then again for management of COVID.  He is being seen with his son Orlin while patient at home.  He denies abdominal pain and states he is not weak like he was in the hospital and recently had blood work by house calls MD.  His diarrhea has improved and denies SOB , cough, fevers but does still feel weak but better than last week

## 2020-05-18 PROBLEM — U07.1 COVID-19 VIRUS INFECTION: Status: RESOLVED | Noted: 2020-01-01 | Resolved: 2020-01-01

## 2020-05-28 PROBLEM — R62.7 FAILURE TO THRIVE IN ADULT: Status: ACTIVE | Noted: 2020-01-01

## 2020-05-28 PROBLEM — C80.1 SIGNET RING CELL ADENOCARCINOMA: Status: ACTIVE | Noted: 2019-05-20

## 2020-05-28 PROBLEM — Z71.89 ADVANCE CARE PLANNING: Status: ACTIVE | Noted: 2020-01-01

## 2020-05-28 PROBLEM — I10 BENIGN ESSENTIAL HYPERTENSION: Status: ACTIVE | Noted: 2020-01-01

## 2020-05-28 PROBLEM — C61 PROSTATE CANCER: Status: ACTIVE | Noted: 2020-01-01

## 2020-05-28 PROBLEM — C18.9 COLON CARCINOMA METASTATIC TO MULTIPLE SITES: Status: ACTIVE | Noted: 2019-01-01

## 2020-05-28 PROBLEM — E22.2 SIADH (SYNDROME OF INAPPROPRIATE ADH PRODUCTION): Status: ACTIVE | Noted: 2020-01-01

## 2020-05-28 PROBLEM — K65.1 PELVIC ABSCESS IN MALE: Status: RESOLVED | Noted: 2020-01-01 | Resolved: 2020-01-01

## 2020-05-28 NOTE — REASON FOR VISIT
[Follow-Up] : a follow-up visit [Family Member] : family member [Pre-Visit Preparation] : pre-visit preparation was done [Intercurrent Specialty/Sub-specialty Visits] : the patient has intercurrent specialty/sub-specialty visits

## 2020-05-28 NOTE — CHRONIC CARE ASSESSMENT
[Can not Exercise (Disability)] : Exercise: The patient can not exercise due to disability [Low Salt Diet] : low salt [Walking] : walking [General Adherence] : and is generally adherent

## 2020-05-28 NOTE — HISTORY OF PRESENT ILLNESS
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, s/p diverting colostomy and mucus fistula in January and May 2019, initiated chemotherapy 2019 with tumour shrinkage, stopped chemo 2020 before going for exploratory laparotomy for tumour debulking and colostomy reversal 3/9/2020,  (not successful), HTN, HLD, prediabetes, h/o prostrate cancer s/p radical prostatectomy, partial left nephrectomy due to benign tumour, recent hospital admission for pelvic abscess and hyponatremia 2/2 SIADH, completed abx, then readmitted on 20 for SOB found to be positive for COVID-19, discharged on 20 with home oxygen, seen today for follow-up via tele health.\par \par Patient is being seen for a visit provided via telehealth real-time audio visual technology.\par Patient was located at their home at the time of the visit.\par The [facility] clinician, Marlene Diana, was located remotely at their home in [place] at the time of the visit. \par The patient and the [facility] clinician participated in the telehealth encounter.\par Other participants included: BUCKY Burris, son\par \par Patient and his representative consents to the use of telehealth. All questions related to telehealth answered\par \par interval event- DIL called two days ago that patient has stopped taking anything orally for the past 3 days, refusing to eat or drink or take medications, very weak, does not want to go to hospital. hospice was offered.\par \par patient has been in bed, unable to get out of bed, mostly sleeping. he is arousable, but refuses to eat and drink or take medications. family has already signed paperwork for hospice and awaiting call for enrollment this afternoon. family is aware that he is actively dying and is making  arrangements. patient denies any pain or discomfort. \par denies nausea, vomiting, fever, chills, SOB, urinary complaints.\par not eating anything PO\par BM is soft via colostomy on right side. daughter in law changes the bag daily or as needed, does not take any laxatives, no more mucus from rectum\par sleeping all day, responsive but very weak\par completely bed bound, some skin breakdown at back as per DIL due to lying on back every day\par \par sigmoid colon cancer with peritoneal disease s/p exp laparotomy- patient had an attempt for tumour debulking and reversal of colostomy early march which was not successful due to extensive disease. declined quickly past two months.\par follows Dr Jones and Dr Sandoval. PCP is Dr Saldaña.\par \par HTN- long standing- on lisinopril daily. no complications reported.\par \par HLD- used to be on atorvastatin, was taken off due to chemotherapy\par \par SIADH- admitted with sodium levels of 117 early 2020, started on fluid restriction and salt tablets.\par \par -----------\par lives with son Orlin, and family who takes care of him\par BUCKY Burris is very involved in care\par \par

## 2020-05-28 NOTE — PHYSICAL EXAM
[Normal Voice/Communication] : normal voice communication [No Acute Distress] : no acute distress [EOMI] : extra ocular movement intact [No Accessory Muscle Use] : no accessory muscle use [No Respiratory Distress] : no respiratory distress [No Edema] : there was no peripheral edema [Non Tender] : non-tender [Soft] : abdomen soft [Not Distended] : not distended [No Motor Deficits] : the motor exam was normal [de-identified] : frail, weak, not in distress, skin color pale, speaks minimally, no breathing issues, looks dehydrated [de-identified] : right sided colostomy with minimal stool [de-identified] : does not wish to turn for sacral examinatio [de-identified] : minimally responsive, look very weak

## 2020-05-28 NOTE — COUNSELING
[Weight counseling provided] : weight counseling provided [Mediterranean diet recommended] : Mediterranean diet recommended [Medical/Nutritional supplementation as prescribed] : medical/nutritional supplementation as prescribed [Continue diet as tolerated] : continue diet as tolerated based on goals of care [Non - Smoker] : non-smoker [Smoke/CO Detectors] : smoke/CO detectors [Use grab bars] : use grab bars [Medical alert] : medical alert [Remove clutter and unsafe carpeting to avoid falls] : remove clutter and unsafe carpeting to avoid falls [Use assistive device to avoid falls] : use assistive device to avoid falls [Date: ___] : diabetic screening completed on [unfilled] [] : foot exam [Improve exercise tolerance] : improve exercise tolerance [Improve mobility] : improve mobility [Improve pain control] : improve pain control [Improve weight] : improve weight [Decrease stress] : decrease stress [Maintain functional ability] : maintain functional ability [Decrease hospital use] : decrease hospital use [Discussed disease trajectory with patient/caregiver] : discussed disease trajectory with patient/caregiver [Patient/Caregiver has ___ understanding of disease process] : patient/caregiver has [unfilled] understanding of disease process [Likely to achieve goals/desired outcomes] : likely to achieve goals/desired outcomes [Advanced Directives discussed: ____] : Advanced directives discussed: [unfilled] [DNR] : Code Status: DNR [No Limitations] : Treatment Guidelines: No limitations [DNI] : Intubation: DNI

## 2020-12-05 NOTE — PHYSICAL THERAPY INITIAL EVALUATION ADULT - RANGE OF MOTION EXAMINATION, REHAB EVAL
Kaya ACP bilateral upper extremity ROM was WFL (within functional limits)/bilateral lower extremity ROM was WFL (within functional limits)

## 2021-06-09 NOTE — H&P PST ADULT - VASCULAR
Angiomax IV totally infused and dc'd. Arterial pressure completed. Right femoral venous introducer dc'd and manual pressure in progress. IV NS over to IV site in left connell. Right femoral introducer removed and manual pressure in progress. No bleeding or hematoma. Patient admitted, consent signed and questions answered. Patient ready for procedure. Call light to reach with side rails up 2 of 2. Right wrist and bilat groin hairs clipped. Family at bedside with patient. History and physical needing update. Patient was discharged off the unit at 11:10 am-Writer went over all discharge paperwork and patient denies any further questions and concerns at this time.  Patient had all personal belongings on discharge as well Pressure completed to right femoral artery and vein. No bleeding or hematoma. Bandaid applied. Patient instructed on post introducer removal restrictions. Returns to room [facility] 4 post procedure. Head of bed flat with right leg straight. Right femoral art line sutured in place with good wave form and flushes well. Right femoral venous introducer also sutured in place with IV NS infusing at 75ml.hr.  Angiomax infusing at 25ml/hr. Post procedure restrictions reviewed with patient and daughters. Declines oral fluids at this time. [address] detailed exam

## 2022-05-13 NOTE — H&P ADULT - PROBLEM/PLAN-5
DISPLAY PLAN FREE TEXT
PAST MEDICAL HISTORY:  Advanced dementia     Aphasic stroke     Constipation     COVID-19 virus detected     CVA (cerebral vascular accident)     Dementia of frontal lobe type     Diabetes mellitus     DM (diabetes mellitus)     GERD (gastroesophageal reflux disease)     HTN (hypertension)     Hypertension     Pneumonia     Quadriplegia     Respiratory failure     Respiratory failure     Type II diabetes mellitus

## 2023-09-26 NOTE — ASU PREOP CHECKLIST - INTERNAL PROSTHESES
Received incoming call from patient. He states he needs Dexcom G7 sensor, not the G6. Resent correct sensor. Patient v/u and thanks.    right chest infusaport

## 2023-10-02 NOTE — ED ADULT NURSE NOTE - NS ED NURSE DC INFO COMPLEXITY
Verbalized Understanding/Simple: Patient demonstrates quick and easy understanding Topical Retinoid counseling:  Patient advised to apply a pea-sized amount only at bedtime and wait 30 minutes after washing their face before applying.  If too drying, patient may add a non-comedogenic moisturizer. The patient verbalized understanding of the proper use and possible adverse effects of retinoids.  All of the patient's questions and concerns were addressed.

## 2023-11-30 NOTE — PATIENT PROFILE ADULT - NSTOBACCO TYPE_GEN_A_CORE_RD
[facility] CT Morrell  Martin - DYMICHELLE       Message sent to [address] [place] [address] to schedule appt with patient?   NO      Requested Prescriptions     Pending Prescriptions Disp Refills    busPIRone (BUSPAR) 7.5 MG tablet [Pharmacy Med Name: BUSPIRONE HCL TABS 7.5MG] 60 tablet 11     Sig: TAKE 1 TABLET TWICE A DAY
Cigarettes

## 2023-12-14 NOTE — PHYSICAL THERAPY INITIAL EVALUATION ADULT - IMPAIRMENTS FOUND, PT EVAL
gait, locomotion, and balance/muscle strength You can access the FollowMyHealth Patient Portal offered by [facility] by registering at the following website: [email or web address]. By joining [employer]’s FollowMyHealth portal, you will also be able to view your health information using other applications (apps) compatible with our system. You can access the FollowMyHealth Patient Portal offered by [facility] by registering at the following website: [email or web address]. By joining [employer]’s FollowMyHealth portal, you will also be able to view your health information using other applications (apps) compatible with our system.

## 2024-01-31 NOTE — DISCHARGE NOTE NURSING/CASE MANAGEMENT/SOCIAL WORK - PATIENT PORTAL LINK FT
Albuterol (Eqv-ProAir HFA) 90 mcg/inh inhalation aerosol: 1 puff(s) inhaled every 6 hours as needed for  shortness of breath and/or wheezing  amoxicillin-clavulanate 875 mg-125 mg oral tablet: 875 milligram(s) orally every 12 hours  atorvastatin 10 mg oral tablet: 1 tab(s) orally once a day (at bedtime)  clopidogrel 75 mg oral tablet: 1 tab(s) orally once a day  Fleet Enema 19 g-7 g rectal enema: 1 each rectally prn as needed for  constipation if no relief from MOM or Dulcolax  hydroxychloroquine 200 mg oral tablet: 1 tab(s) orally twice a day  ibuprofen 600 mg oral tablet: 1 tab(s) orally every 6 hours as needed for  fever / Pain  losartan 25 mg oral tablet: 1 tab(s) orally once a day  Micatin 2% topical cream: Apply topically to affected area 2 times a day , apply to B/L Buttock  Milk of Magnesia 8% oral suspension: 30 milliliter(s) orally prn as needed for  constipation if no BM for 3 days  Multiple Vitamins with Minerals oral tablet: 1 tab(s) orally once a day  Ocean 0.65% nasal spray: 2 spray(s) intranasally 2 times a day  prednisoLONE acetate 1% ophthalmic suspension: 1 drop(s) to each affected eye once a day as needed for  dry eyes  Refresh Plus ophthalmic solution: 1 drop(s) in each eye once a day  Reglan 5 mg oral tablet: 1 tab(s) orally 4 times a day (before meals and at bedtime)  tamsulosin 0.4 mg oral capsule: 1 cap(s) orally once a day (at bedtime)  Tums 500 mg oral tablet, chewable: 1 tab(s) chewed every 6 hours as needed for  heartburn  
You can access the FollowMyHealth Patient Portal offered by [facility] by registering at the following website: [email or web address]. By joining [employer]’s FollowMyHealth portal, you will also be able to view your health information using other applications (apps) compatible with our system.

## 2024-10-31 NOTE — DISCHARGE NOTE NURSING/CASE MANAGEMENT/SOCIAL WORK - NSTRANSFERBELONGINGSRESP_GEN_A_NUR
yes
What Type Of Note Output Would You Prefer (Optional)?: Bullet Format
Hpi Title: Evaluation of Skin Lesions
